# Patient Record
Sex: MALE | Race: WHITE | Employment: OTHER | URBAN - METROPOLITAN AREA
[De-identification: names, ages, dates, MRNs, and addresses within clinical notes are randomized per-mention and may not be internally consistent; named-entity substitution may affect disease eponyms.]

---

## 2017-01-10 ENCOUNTER — TELEPHONE (OUTPATIENT)
Dept: FAMILY MEDICINE CLINIC | Age: 82
End: 2017-01-10

## 2017-01-19 ENCOUNTER — OFFICE VISIT (OUTPATIENT)
Dept: FAMILY MEDICINE CLINIC | Age: 82
End: 2017-01-19

## 2017-01-19 ENCOUNTER — TELEPHONE (OUTPATIENT)
Dept: FAMILY MEDICINE CLINIC | Age: 82
End: 2017-01-19

## 2017-01-19 VITALS
RESPIRATION RATE: 18 BRPM | HEIGHT: 71 IN | WEIGHT: 167 LBS | SYSTOLIC BLOOD PRESSURE: 138 MMHG | HEART RATE: 48 BPM | TEMPERATURE: 97.7 F | DIASTOLIC BLOOD PRESSURE: 80 MMHG | BODY MASS INDEX: 23.38 KG/M2

## 2017-01-19 DIAGNOSIS — D48.5 NEOPLASM OF UNCERTAIN BEHAVIOR OF SKIN: Primary | ICD-10-CM

## 2017-01-19 PROCEDURE — 1036F TOBACCO NON-USER: CPT | Performed by: FAMILY MEDICINE

## 2017-01-19 PROCEDURE — 11100 PR BIOPSY OF SKIN LESION: CPT | Performed by: FAMILY MEDICINE

## 2017-01-19 PROCEDURE — 99999 PR OFFICE/OUTPT VISIT,PROCEDURE ONLY: CPT | Performed by: FAMILY MEDICINE

## 2017-01-23 DIAGNOSIS — C44.629 SQUAMOUS CELL CARCINOMA OF LEFT UPPER EXTREMITY: ICD-10-CM

## 2017-01-24 ENCOUNTER — TELEPHONE (OUTPATIENT)
Dept: FAMILY MEDICINE CLINIC | Age: 82
End: 2017-01-24

## 2017-02-13 ENCOUNTER — OFFICE VISIT (OUTPATIENT)
Dept: FAMILY MEDICINE CLINIC | Age: 82
End: 2017-02-13

## 2017-02-13 VITALS
SYSTOLIC BLOOD PRESSURE: 134 MMHG | HEIGHT: 71 IN | DIASTOLIC BLOOD PRESSURE: 70 MMHG | BODY MASS INDEX: 23.38 KG/M2 | WEIGHT: 167 LBS | HEART RATE: 58 BPM | RESPIRATION RATE: 20 BRPM

## 2017-02-13 DIAGNOSIS — C44.629 SQUAMOUS CELL CARCINOMA OF LEFT UPPER EXTREMITY: ICD-10-CM

## 2017-02-13 DIAGNOSIS — L82.1 SK (SEBORRHEIC KERATOSIS): Primary | ICD-10-CM

## 2017-02-13 PROCEDURE — G8484 FLU IMMUNIZE NO ADMIN: HCPCS | Performed by: FAMILY MEDICINE

## 2017-02-13 PROCEDURE — 99213 OFFICE O/P EST LOW 20 MIN: CPT | Performed by: FAMILY MEDICINE

## 2017-02-13 PROCEDURE — G8420 CALC BMI NORM PARAMETERS: HCPCS | Performed by: FAMILY MEDICINE

## 2017-02-13 PROCEDURE — 4040F PNEUMOC VAC/ADMIN/RCVD: CPT | Performed by: FAMILY MEDICINE

## 2017-02-13 PROCEDURE — G8427 DOCREV CUR MEDS BY ELIG CLIN: HCPCS | Performed by: FAMILY MEDICINE

## 2017-02-13 PROCEDURE — G8599 NO ASA/ANTIPLAT THER USE RNG: HCPCS | Performed by: FAMILY MEDICINE

## 2017-02-13 PROCEDURE — 1036F TOBACCO NON-USER: CPT | Performed by: FAMILY MEDICINE

## 2017-02-13 PROCEDURE — 1123F ACP DISCUSS/DSCN MKR DOCD: CPT | Performed by: FAMILY MEDICINE

## 2017-02-21 ENCOUNTER — PROCEDURE VISIT (OUTPATIENT)
Dept: SURGERY | Age: 82
End: 2017-02-21

## 2017-02-21 VITALS — TEMPERATURE: 97.4 F | OXYGEN SATURATION: 94 % | WEIGHT: 163 LBS | BODY MASS INDEX: 22.73 KG/M2

## 2017-02-21 DIAGNOSIS — D04.62: Primary | ICD-10-CM

## 2017-02-21 PROCEDURE — 12032 INTMD RPR S/A/T/EXT 2.6-7.5: CPT | Performed by: DERMATOLOGY

## 2017-02-21 PROCEDURE — 11603 EXC TR-EXT MAL+MARG 2.1-3 CM: CPT | Performed by: DERMATOLOGY

## 2017-02-22 ENCOUNTER — TELEPHONE (OUTPATIENT)
Dept: FAMILY MEDICINE CLINIC | Age: 82
End: 2017-02-22

## 2017-02-27 ENCOUNTER — TELEPHONE (OUTPATIENT)
Dept: SURGERY | Age: 82
End: 2017-02-27

## 2017-03-03 ENCOUNTER — NURSE ONLY (OUTPATIENT)
Dept: FAMILY MEDICINE CLINIC | Age: 82
End: 2017-03-03

## 2017-03-03 VITALS — SYSTOLIC BLOOD PRESSURE: 138 MMHG | DIASTOLIC BLOOD PRESSURE: 70 MMHG | HEART RATE: 76 BPM

## 2017-03-03 DIAGNOSIS — Z01.30 BLOOD PRESSURE CHECK: Primary | ICD-10-CM

## 2017-03-03 PROCEDURE — 99999 PR OFFICE/OUTPT VISIT,PROCEDURE ONLY: CPT | Performed by: FAMILY MEDICINE

## 2017-03-06 ENCOUNTER — NURSE ONLY (OUTPATIENT)
Dept: SURGERY | Age: 82
End: 2017-03-06

## 2017-03-06 ENCOUNTER — OFFICE VISIT (OUTPATIENT)
Dept: FAMILY MEDICINE CLINIC | Age: 82
End: 2017-03-06

## 2017-03-06 VITALS
HEART RATE: 76 BPM | DIASTOLIC BLOOD PRESSURE: 80 MMHG | BODY MASS INDEX: 23.52 KG/M2 | WEIGHT: 168 LBS | HEIGHT: 71 IN | SYSTOLIC BLOOD PRESSURE: 138 MMHG | TEMPERATURE: 98.6 F | RESPIRATION RATE: 16 BRPM

## 2017-03-06 DIAGNOSIS — S46.811A TRAPEZIUS STRAIN, RIGHT, INITIAL ENCOUNTER: Primary | ICD-10-CM

## 2017-03-06 DIAGNOSIS — Z48.02 VISIT FOR SUTURE REMOVAL: Primary | ICD-10-CM

## 2017-03-06 PROCEDURE — 1123F ACP DISCUSS/DSCN MKR DOCD: CPT | Performed by: FAMILY MEDICINE

## 2017-03-06 PROCEDURE — 4040F PNEUMOC VAC/ADMIN/RCVD: CPT | Performed by: FAMILY MEDICINE

## 2017-03-06 PROCEDURE — G8428 CUR MEDS NOT DOCUMENT: HCPCS | Performed by: FAMILY MEDICINE

## 2017-03-06 PROCEDURE — 99213 OFFICE O/P EST LOW 20 MIN: CPT | Performed by: FAMILY MEDICINE

## 2017-03-06 PROCEDURE — 1036F TOBACCO NON-USER: CPT | Performed by: FAMILY MEDICINE

## 2017-03-06 PROCEDURE — G8420 CALC BMI NORM PARAMETERS: HCPCS | Performed by: FAMILY MEDICINE

## 2017-03-06 PROCEDURE — G8484 FLU IMMUNIZE NO ADMIN: HCPCS | Performed by: FAMILY MEDICINE

## 2017-03-06 PROCEDURE — G8599 NO ASA/ANTIPLAT THER USE RNG: HCPCS | Performed by: FAMILY MEDICINE

## 2017-03-08 ENCOUNTER — TELEPHONE (OUTPATIENT)
Dept: FAMILY MEDICINE CLINIC | Age: 82
End: 2017-03-08

## 2017-03-08 DIAGNOSIS — S46.811S TRAPEZIUS STRAIN, RIGHT, SEQUELA: ICD-10-CM

## 2017-03-08 DIAGNOSIS — S46.911S SHOULDER STRAIN, RIGHT, SEQUELA: Primary | ICD-10-CM

## 2017-03-08 RX ORDER — MELOXICAM 15 MG/1
15 TABLET ORAL DAILY
Qty: 30 TABLET | Refills: 0 | Status: SHIPPED | OUTPATIENT
Start: 2017-03-08 | End: 2017-04-13

## 2017-03-15 ENCOUNTER — TELEPHONE (OUTPATIENT)
Dept: FAMILY MEDICINE CLINIC | Age: 82
End: 2017-03-15

## 2017-03-16 ENCOUNTER — HOSPITAL ENCOUNTER (OUTPATIENT)
Dept: OTHER | Age: 82
Discharge: HOME | End: 2017-03-31
Attending: FAMILY MEDICINE | Admitting: FAMILY MEDICINE

## 2017-03-17 ENCOUNTER — TELEPHONE (OUTPATIENT)
Dept: FAMILY MEDICINE CLINIC | Age: 82
End: 2017-03-17

## 2017-03-20 PROBLEM — J96.00 ACUTE RESPIRATORY FAILURE (HCC): Status: ACTIVE | Noted: 2017-03-20

## 2017-03-20 PROBLEM — R79.89 ELEVATED TROPONIN: Status: ACTIVE | Noted: 2017-03-20

## 2017-03-20 PROBLEM — R77.8 ELEVATED TROPONIN: Status: ACTIVE | Noted: 2017-03-20

## 2017-03-20 PROBLEM — I50.23 ACUTE ON CHRONIC SYSTOLIC CHF (CONGESTIVE HEART FAILURE) (HCC): Status: ACTIVE | Noted: 2017-03-20

## 2017-03-24 ENCOUNTER — TELEPHONE (OUTPATIENT)
Dept: FAMILY MEDICINE CLINIC | Age: 82
End: 2017-03-24

## 2017-03-28 ENCOUNTER — OFFICE VISIT (OUTPATIENT)
Dept: CARDIOLOGY CLINIC | Age: 82
End: 2017-03-28

## 2017-03-28 VITALS
HEART RATE: 50 BPM | SYSTOLIC BLOOD PRESSURE: 120 MMHG | OXYGEN SATURATION: 98 % | WEIGHT: 164.5 LBS | HEIGHT: 71 IN | DIASTOLIC BLOOD PRESSURE: 58 MMHG | BODY MASS INDEX: 23.03 KG/M2

## 2017-03-28 DIAGNOSIS — I27.20 PULMONARY HYPERTENSION (HCC): ICD-10-CM

## 2017-03-28 DIAGNOSIS — Z95.1 S/P CABG (CORONARY ARTERY BYPASS GRAFT): ICD-10-CM

## 2017-03-28 DIAGNOSIS — I10 ESSENTIAL HYPERTENSION: ICD-10-CM

## 2017-03-28 DIAGNOSIS — I27.81 COR PULMONALE (HCC): Primary | ICD-10-CM

## 2017-03-28 DIAGNOSIS — I48.91 ATRIAL FIBRILLATION WITH SLOW VENTRICULAR RESPONSE (HCC): ICD-10-CM

## 2017-03-28 DIAGNOSIS — I25.10 CORONARY ARTERY DISEASE INVOLVING NATIVE CORONARY ARTERY OF NATIVE HEART WITHOUT ANGINA PECTORIS: ICD-10-CM

## 2017-03-28 PROCEDURE — G8427 DOCREV CUR MEDS BY ELIG CLIN: HCPCS | Performed by: INTERNAL MEDICINE

## 2017-03-28 PROCEDURE — 99214 OFFICE O/P EST MOD 30 MIN: CPT | Performed by: INTERNAL MEDICINE

## 2017-03-28 PROCEDURE — G8419 CALC BMI OUT NRM PARAM NOF/U: HCPCS | Performed by: INTERNAL MEDICINE

## 2017-03-28 PROCEDURE — 1123F ACP DISCUSS/DSCN MKR DOCD: CPT | Performed by: INTERNAL MEDICINE

## 2017-03-28 PROCEDURE — 93010 ELECTROCARDIOGRAM REPORT: CPT | Performed by: INTERNAL MEDICINE

## 2017-03-28 PROCEDURE — G8484 FLU IMMUNIZE NO ADMIN: HCPCS | Performed by: INTERNAL MEDICINE

## 2017-03-28 PROCEDURE — 1036F TOBACCO NON-USER: CPT | Performed by: INTERNAL MEDICINE

## 2017-03-28 PROCEDURE — 4040F PNEUMOC VAC/ADMIN/RCVD: CPT | Performed by: INTERNAL MEDICINE

## 2017-03-28 PROCEDURE — G8598 ASA/ANTIPLAT THER USED: HCPCS | Performed by: INTERNAL MEDICINE

## 2017-03-28 PROCEDURE — 1111F DSCHRG MED/CURRENT MED MERGE: CPT | Performed by: INTERNAL MEDICINE

## 2017-03-28 RX ORDER — SODIUM PHOSPHATE, DIBASIC AND SODIUM PHOSPHATE, MONOBASIC 7; 19 G/133ML; G/133ML
1 ENEMA RECTAL
COMMUNITY
End: 2017-05-23

## 2017-03-28 RX ORDER — BISACODYL 10 MG
10 SUPPOSITORY, RECTAL RECTAL DAILY
Status: ON HOLD | COMMUNITY
End: 2017-04-15

## 2017-03-28 RX ORDER — MAGNESIUM HYDROXIDE/ALUMINUM HYDROXICE/SIMETHICONE 120; 1200; 1200 MG/30ML; MG/30ML; MG/30ML
30 SUSPENSION ORAL EVERY 6 HOURS PRN
COMMUNITY
End: 2017-05-23

## 2017-03-28 RX ORDER — AMLODIPINE BESYLATE 5 MG/1
5 TABLET ORAL DAILY
COMMUNITY
End: 2017-04-13

## 2017-04-12 ENCOUNTER — TELEPHONE (OUTPATIENT)
Dept: CARDIOLOGY CLINIC | Age: 82
End: 2017-04-12

## 2017-04-13 ENCOUNTER — CARE COORDINATION (OUTPATIENT)
Dept: OTHER | Facility: CLINIC | Age: 82
End: 2017-04-13

## 2017-04-20 ENCOUNTER — CARE COORDINATION (OUTPATIENT)
Dept: OTHER | Facility: CLINIC | Age: 82
End: 2017-04-20

## 2017-04-27 ENCOUNTER — EPISODE CHANGES (OUTPATIENT)
Dept: CASE MANAGEMENT | Age: 82
End: 2017-04-27

## 2017-05-03 ENCOUNTER — TELEPHONE (OUTPATIENT)
Dept: CARDIOLOGY CLINIC | Age: 82
End: 2017-05-03

## 2017-05-16 ENCOUNTER — TELEPHONE (OUTPATIENT)
Dept: FAMILY MEDICINE CLINIC | Age: 82
End: 2017-05-16

## 2017-05-17 ENCOUNTER — TELEPHONE (OUTPATIENT)
Dept: FAMILY MEDICINE CLINIC | Age: 82
End: 2017-05-17

## 2017-05-17 RX ORDER — FUROSEMIDE 20 MG/1
60 TABLET ORAL DAILY
Qty: 90 TABLET | Refills: 0 | Status: SHIPPED | OUTPATIENT
Start: 2017-05-17 | End: 2017-06-15 | Stop reason: SDUPTHER

## 2017-05-17 RX ORDER — POTASSIUM CHLORIDE 750 MG/1
10 TABLET, FILM COATED, EXTENDED RELEASE ORAL DAILY
Qty: 90 TABLET | Refills: 0 | Status: ON HOLD | OUTPATIENT
Start: 2017-05-17 | End: 2017-07-22 | Stop reason: HOSPADM

## 2017-05-18 ENCOUNTER — TELEPHONE (OUTPATIENT)
Dept: FAMILY MEDICINE CLINIC | Age: 82
End: 2017-05-18

## 2017-05-22 ENCOUNTER — TELEPHONE (OUTPATIENT)
Dept: FAMILY MEDICINE CLINIC | Age: 82
End: 2017-05-22

## 2017-05-23 ENCOUNTER — TELEPHONE (OUTPATIENT)
Dept: FAMILY MEDICINE CLINIC | Age: 82
End: 2017-05-23

## 2017-05-23 ENCOUNTER — OFFICE VISIT (OUTPATIENT)
Dept: FAMILY MEDICINE CLINIC | Age: 82
End: 2017-05-23

## 2017-05-23 VITALS
HEART RATE: 44 BPM | TEMPERATURE: 98.1 F | BODY MASS INDEX: 23.24 KG/M2 | RESPIRATION RATE: 16 BRPM | WEIGHT: 166 LBS | HEIGHT: 71 IN | SYSTOLIC BLOOD PRESSURE: 136 MMHG | DIASTOLIC BLOOD PRESSURE: 70 MMHG

## 2017-05-23 DIAGNOSIS — D64.9 ANEMIA, UNSPECIFIED TYPE: ICD-10-CM

## 2017-05-23 DIAGNOSIS — R73.9 HYPERGLYCEMIA: ICD-10-CM

## 2017-05-23 DIAGNOSIS — I10 ESSENTIAL HYPERTENSION: Primary | ICD-10-CM

## 2017-05-23 DIAGNOSIS — R42 DIZZINESS: ICD-10-CM

## 2017-05-23 DIAGNOSIS — I10 ESSENTIAL HYPERTENSION: ICD-10-CM

## 2017-05-23 DIAGNOSIS — I25.10 CORONARY ARTERY DISEASE INVOLVING NATIVE CORONARY ARTERY OF NATIVE HEART WITHOUT ANGINA PECTORIS: ICD-10-CM

## 2017-05-23 DIAGNOSIS — I48.91 ATRIAL FIBRILLATION WITH SLOW VENTRICULAR RESPONSE (HCC): ICD-10-CM

## 2017-05-23 DIAGNOSIS — J43.9 PULMONARY EMPHYSEMA, UNSPECIFIED EMPHYSEMA TYPE (HCC): ICD-10-CM

## 2017-05-23 DIAGNOSIS — E78.5 HYPERLIPIDEMIA LDL GOAL <130: ICD-10-CM

## 2017-05-23 DIAGNOSIS — J44.9 OBSTRUCTIVE CHRONIC BRONCHITIS WITHOUT EXACERBATION (HCC): ICD-10-CM

## 2017-05-23 DIAGNOSIS — I27.20 PULMONARY HYPERTENSION (HCC): ICD-10-CM

## 2017-05-23 DIAGNOSIS — I27.81 COR PULMONALE (HCC): ICD-10-CM

## 2017-05-23 DIAGNOSIS — F03.90 DEMENTIA WITHOUT BEHAVIORAL DISTURBANCE, UNSPECIFIED DEMENTIA TYPE: ICD-10-CM

## 2017-05-23 DIAGNOSIS — F41.9 ANXIETY: ICD-10-CM

## 2017-05-23 PROBLEM — J96.00 ACUTE RESPIRATORY FAILURE (HCC): Status: RESOLVED | Noted: 2017-03-20 | Resolved: 2017-05-23

## 2017-05-23 PROBLEM — I50.23 ACUTE ON CHRONIC SYSTOLIC CHF (CONGESTIVE HEART FAILURE) (HCC): Status: RESOLVED | Noted: 2017-03-20 | Resolved: 2017-05-23

## 2017-05-23 PROBLEM — Z48.02 VISIT FOR SUTURE REMOVAL: Status: RESOLVED | Noted: 2017-03-06 | Resolved: 2017-05-23

## 2017-05-23 PROBLEM — R79.89 ELEVATED TROPONIN: Status: RESOLVED | Noted: 2017-03-20 | Resolved: 2017-05-23

## 2017-05-23 PROBLEM — R77.8 ELEVATED TROPONIN: Status: RESOLVED | Noted: 2017-03-20 | Resolved: 2017-05-23

## 2017-05-23 LAB
ANION GAP SERPL CALCULATED.3IONS-SCNC: 16 MMOL/L (ref 3–16)
BASOPHILS ABSOLUTE: 0.1 K/UL (ref 0–0.2)
BASOPHILS RELATIVE PERCENT: 0.5 %
BUN BLDV-MCNC: 22 MG/DL (ref 7–20)
CALCIUM SERPL-MCNC: 9 MG/DL (ref 8.3–10.6)
CHLORIDE BLD-SCNC: 101 MMOL/L (ref 99–110)
CO2: 25 MMOL/L (ref 21–32)
CREAT SERPL-MCNC: 1.2 MG/DL (ref 0.8–1.3)
EOSINOPHILS ABSOLUTE: 0.1 K/UL (ref 0–0.6)
EOSINOPHILS RELATIVE PERCENT: 1.4 %
GFR AFRICAN AMERICAN: >60
GFR NON-AFRICAN AMERICAN: 57
GLUCOSE BLD-MCNC: 106 MG/DL (ref 70–99)
HCT VFR BLD CALC: 33.5 % (ref 40.5–52.5)
HEMOGLOBIN: 10.8 G/DL (ref 13.5–17.5)
LYMPHOCYTES ABSOLUTE: 3.1 K/UL (ref 1–5.1)
LYMPHOCYTES RELATIVE PERCENT: 29.4 %
MCH RBC QN AUTO: 29.1 PG (ref 26–34)
MCHC RBC AUTO-ENTMCNC: 32.3 G/DL (ref 31–36)
MCV RBC AUTO: 90.1 FL (ref 80–100)
MONOCYTES ABSOLUTE: 0.7 K/UL (ref 0–1.3)
MONOCYTES RELATIVE PERCENT: 6.3 %
NEUTROPHILS ABSOLUTE: 6.5 K/UL (ref 1.7–7.7)
NEUTROPHILS RELATIVE PERCENT: 62.4 %
PDW BLD-RTO: 15 % (ref 12.4–15.4)
PLATELET # BLD: 290 K/UL (ref 135–450)
PMV BLD AUTO: 6.5 FL (ref 5–10.5)
POTASSIUM SERPL-SCNC: 4.5 MMOL/L (ref 3.5–5.1)
RBC # BLD: 3.72 M/UL (ref 4.2–5.9)
SODIUM BLD-SCNC: 142 MMOL/L (ref 136–145)
WBC # BLD: 10.5 K/UL (ref 4–11)

## 2017-05-23 PROCEDURE — G8427 DOCREV CUR MEDS BY ELIG CLIN: HCPCS | Performed by: FAMILY MEDICINE

## 2017-05-23 PROCEDURE — 99214 OFFICE O/P EST MOD 30 MIN: CPT | Performed by: FAMILY MEDICINE

## 2017-05-23 PROCEDURE — 1036F TOBACCO NON-USER: CPT | Performed by: FAMILY MEDICINE

## 2017-05-23 PROCEDURE — G8420 CALC BMI NORM PARAMETERS: HCPCS | Performed by: FAMILY MEDICINE

## 2017-05-23 PROCEDURE — 1123F ACP DISCUSS/DSCN MKR DOCD: CPT | Performed by: FAMILY MEDICINE

## 2017-05-23 PROCEDURE — G8598 ASA/ANTIPLAT THER USED: HCPCS | Performed by: FAMILY MEDICINE

## 2017-05-23 PROCEDURE — 4040F PNEUMOC VAC/ADMIN/RCVD: CPT | Performed by: FAMILY MEDICINE

## 2017-05-23 PROCEDURE — 3023F SPIROM DOC REV: CPT | Performed by: FAMILY MEDICINE

## 2017-05-23 PROCEDURE — G8926 SPIRO NO PERF OR DOC: HCPCS | Performed by: FAMILY MEDICINE

## 2017-05-23 RX ORDER — GABAPENTIN 300 MG/1
300 CAPSULE ORAL 2 TIMES DAILY
Qty: 180 CAPSULE | Refills: 1 | Status: SHIPPED | OUTPATIENT
Start: 2017-05-23 | End: 2017-07-25 | Stop reason: SDUPTHER

## 2017-05-23 RX ORDER — HYDRALAZINE HYDROCHLORIDE 50 MG/1
50 TABLET, FILM COATED ORAL 2 TIMES DAILY
Qty: 180 TABLET | Refills: 1 | Status: SHIPPED | OUTPATIENT
Start: 2017-05-23 | End: 2017-05-24 | Stop reason: SDUPTHER

## 2017-05-23 RX ORDER — MULTIVIT WITH MINERALS/LUTEIN
1 TABLET ORAL NIGHTLY
COMMUNITY
End: 2017-07-25 | Stop reason: SDUPTHER

## 2017-05-23 RX ORDER — GEMFIBROZIL 600 MG/1
600 TABLET, FILM COATED ORAL
Qty: 180 TABLET | Refills: 3 | Status: SHIPPED | OUTPATIENT
Start: 2017-05-23 | End: 2017-07-25 | Stop reason: SDUPTHER

## 2017-05-23 RX ORDER — HYDRALAZINE HYDROCHLORIDE 50 MG/1
50 TABLET, FILM COATED ORAL 2 TIMES DAILY
Qty: 180 TABLET | Refills: 1 | Status: SHIPPED | OUTPATIENT
Start: 2017-05-23 | End: 2017-05-23 | Stop reason: SDUPTHER

## 2017-05-23 RX ORDER — LANOLIN ALCOHOL/MO/W.PET/CERES
500 CREAM (GRAM) TOPICAL DAILY
COMMUNITY
End: 2017-07-10 | Stop reason: DRUGHIGH

## 2017-05-23 RX ORDER — LISINOPRIL 40 MG/1
40 TABLET ORAL DAILY
Qty: 90 TABLET | Refills: 1 | Status: SHIPPED | OUTPATIENT
Start: 2017-05-23 | End: 2017-07-25 | Stop reason: SDUPTHER

## 2017-05-23 RX ORDER — FENOFIBRATE 145 MG/1
145 TABLET, COATED ORAL DAILY
Qty: 90 TABLET | Refills: 1 | Status: SHIPPED | OUTPATIENT
Start: 2017-05-23 | End: 2017-05-23

## 2017-05-23 RX ORDER — ROPINIROLE 2 MG/1
2 TABLET, FILM COATED ORAL NIGHTLY
Qty: 90 TABLET | Refills: 1 | Status: SHIPPED | OUTPATIENT
Start: 2017-05-23 | End: 2017-07-25 | Stop reason: SDUPTHER

## 2017-05-24 ENCOUNTER — TELEPHONE (OUTPATIENT)
Dept: FAMILY MEDICINE CLINIC | Age: 82
End: 2017-05-24

## 2017-05-24 PROBLEM — R00.1 BRADYCARDIA: Status: ACTIVE | Noted: 2017-05-24

## 2017-05-24 RX ORDER — HYDRALAZINE HYDROCHLORIDE 50 MG/1
75 TABLET, FILM COATED ORAL 2 TIMES DAILY
Qty: 45 TABLET | Refills: 2 | Status: SHIPPED | OUTPATIENT
Start: 2017-05-24 | End: 2017-07-25 | Stop reason: SDUPTHER

## 2017-05-31 ENCOUNTER — TELEPHONE (OUTPATIENT)
Dept: FAMILY MEDICINE CLINIC | Age: 82
End: 2017-05-31

## 2017-06-05 ENCOUNTER — TELEPHONE (OUTPATIENT)
Dept: FAMILY MEDICINE CLINIC | Age: 82
End: 2017-06-05

## 2017-06-06 PROBLEM — R33.9 URINARY RETENTION: Status: ACTIVE | Noted: 2017-06-06

## 2017-06-10 ENCOUNTER — CARE COORDINATION (OUTPATIENT)
Dept: CASE MANAGEMENT | Age: 82
End: 2017-06-10

## 2017-06-12 ENCOUNTER — CARE COORDINATION (OUTPATIENT)
Dept: CASE MANAGEMENT | Age: 82
End: 2017-06-12

## 2017-06-12 ENCOUNTER — TELEPHONE (OUTPATIENT)
Dept: FAMILY MEDICINE CLINIC | Age: 82
End: 2017-06-12

## 2017-06-14 ENCOUNTER — TELEPHONE (OUTPATIENT)
Dept: FAMILY MEDICINE CLINIC | Age: 82
End: 2017-06-14

## 2017-06-14 RX ORDER — BISACODYL 10 MG
10 SUPPOSITORY, RECTAL RECTAL DAILY PRN
Qty: 10 SUPPOSITORY | Refills: 0 | Status: SHIPPED | OUTPATIENT
Start: 2017-06-14 | End: 2017-07-10 | Stop reason: ALTCHOICE

## 2017-06-15 RX ORDER — FUROSEMIDE 20 MG/1
TABLET ORAL
Qty: 90 TABLET | Refills: 0 | Status: SHIPPED | OUTPATIENT
Start: 2017-06-15 | End: 2017-06-19

## 2017-06-19 ENCOUNTER — TELEPHONE (OUTPATIENT)
Dept: FAMILY MEDICINE CLINIC | Age: 82
End: 2017-06-19

## 2017-06-19 RX ORDER — FUROSEMIDE 20 MG/1
TABLET ORAL
Qty: 90 TABLET | Refills: 0 | Status: SHIPPED | OUTPATIENT
Start: 2017-06-19 | End: 2017-06-23

## 2017-06-23 RX ORDER — FUROSEMIDE 20 MG/1
TABLET ORAL
Qty: 90 TABLET | Refills: 2 | Status: SHIPPED | OUTPATIENT
Start: 2017-06-23 | End: 2017-07-10 | Stop reason: SDUPTHER

## 2017-06-28 ENCOUNTER — TELEPHONE (OUTPATIENT)
Dept: FAMILY MEDICINE CLINIC | Age: 82
End: 2017-06-28

## 2017-07-10 ENCOUNTER — OFFICE VISIT (OUTPATIENT)
Dept: CARDIOLOGY CLINIC | Age: 82
End: 2017-07-10

## 2017-07-10 VITALS
BODY MASS INDEX: 18.93 KG/M2 | HEIGHT: 71 IN | DIASTOLIC BLOOD PRESSURE: 40 MMHG | HEART RATE: 53 BPM | OXYGEN SATURATION: 98 % | SYSTOLIC BLOOD PRESSURE: 110 MMHG | WEIGHT: 135.2 LBS

## 2017-07-10 DIAGNOSIS — Z95.1 S/P CABG (CORONARY ARTERY BYPASS GRAFT): ICD-10-CM

## 2017-07-10 DIAGNOSIS — I50.810 RIGHT-SIDED HEART FAILURE (HCC): Primary | ICD-10-CM

## 2017-07-10 DIAGNOSIS — I48.91 ATRIAL FIBRILLATION WITH SLOW VENTRICULAR RESPONSE (HCC): ICD-10-CM

## 2017-07-10 DIAGNOSIS — I25.10 CORONARY ARTERY DISEASE INVOLVING NATIVE CORONARY ARTERY OF NATIVE HEART WITHOUT ANGINA PECTORIS: ICD-10-CM

## 2017-07-10 DIAGNOSIS — I10 ESSENTIAL HYPERTENSION: ICD-10-CM

## 2017-07-10 PROCEDURE — 1123F ACP DISCUSS/DSCN MKR DOCD: CPT | Performed by: INTERNAL MEDICINE

## 2017-07-10 PROCEDURE — G8598 ASA/ANTIPLAT THER USED: HCPCS | Performed by: INTERNAL MEDICINE

## 2017-07-10 PROCEDURE — G8427 DOCREV CUR MEDS BY ELIG CLIN: HCPCS | Performed by: INTERNAL MEDICINE

## 2017-07-10 PROCEDURE — 99214 OFFICE O/P EST MOD 30 MIN: CPT | Performed by: INTERNAL MEDICINE

## 2017-07-10 PROCEDURE — G8420 CALC BMI NORM PARAMETERS: HCPCS | Performed by: INTERNAL MEDICINE

## 2017-07-10 PROCEDURE — 93000 ELECTROCARDIOGRAM COMPLETE: CPT | Performed by: INTERNAL MEDICINE

## 2017-07-10 PROCEDURE — 1036F TOBACCO NON-USER: CPT | Performed by: INTERNAL MEDICINE

## 2017-07-10 PROCEDURE — 4040F PNEUMOC VAC/ADMIN/RCVD: CPT | Performed by: INTERNAL MEDICINE

## 2017-07-10 RX ORDER — FUROSEMIDE 20 MG/1
60 TABLET ORAL DAILY
Qty: 90 TABLET | Refills: 3 | Status: SHIPPED | OUTPATIENT
Start: 2017-07-10 | End: 2017-07-25 | Stop reason: SDUPTHER

## 2017-07-11 ENCOUNTER — TELEPHONE (OUTPATIENT)
Dept: FAMILY MEDICINE CLINIC | Age: 82
End: 2017-07-11

## 2017-07-19 ENCOUNTER — TELEPHONE (OUTPATIENT)
Dept: FAMILY MEDICINE CLINIC | Age: 82
End: 2017-07-19

## 2017-07-23 ENCOUNTER — CARE COORDINATION (OUTPATIENT)
Dept: CASE MANAGEMENT | Age: 82
End: 2017-07-23

## 2017-07-25 DIAGNOSIS — I10 ESSENTIAL HYPERTENSION: ICD-10-CM

## 2017-07-25 RX ORDER — FUROSEMIDE 20 MG/1
60 TABLET ORAL DAILY
Qty: 90 TABLET | Refills: 3 | Status: SHIPPED | OUTPATIENT
Start: 2017-07-25 | End: 2017-08-22 | Stop reason: SDUPTHER

## 2017-07-25 RX ORDER — ISOSORBIDE MONONITRATE 30 MG/1
30 TABLET, EXTENDED RELEASE ORAL DAILY
Qty: 30 TABLET | Refills: 5 | Status: ON HOLD | OUTPATIENT
Start: 2017-07-25 | End: 2017-12-09

## 2017-07-25 RX ORDER — HYDRALAZINE HYDROCHLORIDE 50 MG/1
75 TABLET, FILM COATED ORAL 2 TIMES DAILY
Qty: 45 TABLET | Refills: 2 | Status: SHIPPED | OUTPATIENT
Start: 2017-07-25 | End: 2017-09-05 | Stop reason: SDUPTHER

## 2017-07-25 RX ORDER — LISINOPRIL 40 MG/1
40 TABLET ORAL DAILY
Qty: 90 TABLET | Refills: 1 | Status: ON HOLD | OUTPATIENT
Start: 2017-07-25 | End: 2017-12-09

## 2017-07-25 RX ORDER — DOCUSATE SODIUM 100 MG/1
100 CAPSULE, LIQUID FILLED ORAL 2 TIMES DAILY
Qty: 60 CAPSULE | Refills: 5 | Status: SHIPPED | OUTPATIENT
Start: 2017-07-25 | End: 2018-02-14 | Stop reason: SDUPTHER

## 2017-07-25 RX ORDER — GEMFIBROZIL 600 MG/1
600 TABLET, FILM COATED ORAL
Qty: 180 TABLET | Refills: 3 | Status: SHIPPED | OUTPATIENT
Start: 2017-07-25 | End: 2019-11-23

## 2017-07-25 RX ORDER — ROPINIROLE 2 MG/1
2 TABLET, FILM COATED ORAL NIGHTLY
Qty: 90 TABLET | Refills: 1 | Status: SHIPPED | OUTPATIENT
Start: 2017-07-25 | End: 2018-03-19 | Stop reason: SDUPTHER

## 2017-07-25 RX ORDER — GABAPENTIN 300 MG/1
300 CAPSULE ORAL 2 TIMES DAILY
Qty: 180 CAPSULE | Refills: 1 | Status: ON HOLD | OUTPATIENT
Start: 2017-07-25 | End: 2017-12-05 | Stop reason: HOSPADM

## 2017-07-25 RX ORDER — MULTIVIT WITH MINERALS/LUTEIN
1 TABLET ORAL NIGHTLY
Qty: 30 TABLET | Refills: 5 | Status: SHIPPED | OUTPATIENT
Start: 2017-07-25 | End: 2018-07-30 | Stop reason: SDUPTHER

## 2017-07-25 RX ORDER — CEFPODOXIME PROXETIL 100 MG/1
100 TABLET, FILM COATED ORAL 2 TIMES DAILY
Qty: 18 TABLET | Refills: 0 | Status: SHIPPED | OUTPATIENT
Start: 2017-07-25 | End: 2017-08-03

## 2017-07-25 RX ORDER — CALCIUM CARBONATE 500(1250)
500 TABLET ORAL 2 TIMES DAILY
Qty: 60 TABLET | Refills: 5 | Status: ON HOLD | OUTPATIENT
Start: 2017-07-25 | End: 2017-12-03 | Stop reason: ALTCHOICE

## 2017-08-10 ENCOUNTER — TELEPHONE (OUTPATIENT)
Dept: FAMILY MEDICINE CLINIC | Age: 82
End: 2017-08-10

## 2017-08-14 RX ORDER — POTASSIUM CHLORIDE 750 MG/1
TABLET, FILM COATED, EXTENDED RELEASE ORAL
Qty: 90 TABLET | Refills: 0 | Status: ON HOLD | OUTPATIENT
Start: 2017-08-14 | End: 2017-12-03 | Stop reason: ALTCHOICE

## 2017-08-21 ENCOUNTER — OFFICE VISIT (OUTPATIENT)
Dept: FAMILY MEDICINE CLINIC | Age: 82
End: 2017-08-21

## 2017-08-21 VITALS
DIASTOLIC BLOOD PRESSURE: 74 MMHG | TEMPERATURE: 98.1 F | HEART RATE: 58 BPM | WEIGHT: 156 LBS | BODY MASS INDEX: 21.84 KG/M2 | RESPIRATION RATE: 18 BRPM | HEIGHT: 71 IN | SYSTOLIC BLOOD PRESSURE: 138 MMHG

## 2017-08-21 DIAGNOSIS — R73.9 HYPERGLYCEMIA: ICD-10-CM

## 2017-08-21 DIAGNOSIS — J43.9 PULMONARY EMPHYSEMA, UNSPECIFIED EMPHYSEMA TYPE (HCC): ICD-10-CM

## 2017-08-21 DIAGNOSIS — F41.9 ANXIETY: ICD-10-CM

## 2017-08-21 DIAGNOSIS — F03.90 DEMENTIA WITHOUT BEHAVIORAL DISTURBANCE, UNSPECIFIED DEMENTIA TYPE: ICD-10-CM

## 2017-08-21 DIAGNOSIS — I27.20 PULMONARY HYPERTENSION (HCC): ICD-10-CM

## 2017-08-21 DIAGNOSIS — I10 ESSENTIAL HYPERTENSION: ICD-10-CM

## 2017-08-21 DIAGNOSIS — Z87.440 HISTORY OF UTI: ICD-10-CM

## 2017-08-21 DIAGNOSIS — I48.91 ATRIAL FIBRILLATION WITH SLOW VENTRICULAR RESPONSE (HCC): ICD-10-CM

## 2017-08-21 DIAGNOSIS — I10 ESSENTIAL HYPERTENSION: Primary | ICD-10-CM

## 2017-08-21 DIAGNOSIS — I27.81 COR PULMONALE, CHRONIC (HCC): ICD-10-CM

## 2017-08-21 DIAGNOSIS — E87.5 HYPERKALEMIA: ICD-10-CM

## 2017-08-21 DIAGNOSIS — N39.498 OTHER URINARY INCONTINENCE: ICD-10-CM

## 2017-08-21 PROBLEM — R77.8 ELEVATED TROPONIN: Status: RESOLVED | Noted: 2017-03-20 | Resolved: 2017-08-21

## 2017-08-21 PROBLEM — R79.89 ELEVATED TROPONIN: Status: RESOLVED | Noted: 2017-03-20 | Resolved: 2017-08-21

## 2017-08-21 LAB
ANION GAP SERPL CALCULATED.3IONS-SCNC: 13 MMOL/L (ref 3–16)
BUN BLDV-MCNC: 36 MG/DL (ref 7–20)
CALCIUM SERPL-MCNC: 9.3 MG/DL (ref 8.3–10.6)
CHLORIDE BLD-SCNC: 98 MMOL/L (ref 99–110)
CO2: 26 MMOL/L (ref 21–32)
CREAT SERPL-MCNC: 1.4 MG/DL (ref 0.8–1.3)
GFR AFRICAN AMERICAN: 57
GFR NON-AFRICAN AMERICAN: 47
GLUCOSE BLD-MCNC: 103 MG/DL (ref 70–99)
POTASSIUM SERPL-SCNC: 4.8 MMOL/L (ref 3.5–5.1)
SODIUM BLD-SCNC: 137 MMOL/L (ref 136–145)

## 2017-08-21 PROCEDURE — 1036F TOBACCO NON-USER: CPT | Performed by: FAMILY MEDICINE

## 2017-08-21 PROCEDURE — G8420 CALC BMI NORM PARAMETERS: HCPCS | Performed by: FAMILY MEDICINE

## 2017-08-21 PROCEDURE — 1123F ACP DISCUSS/DSCN MKR DOCD: CPT | Performed by: FAMILY MEDICINE

## 2017-08-21 PROCEDURE — 1111F DSCHRG MED/CURRENT MED MERGE: CPT | Performed by: FAMILY MEDICINE

## 2017-08-21 PROCEDURE — G8598 ASA/ANTIPLAT THER USED: HCPCS | Performed by: FAMILY MEDICINE

## 2017-08-21 PROCEDURE — G8926 SPIRO NO PERF OR DOC: HCPCS | Performed by: FAMILY MEDICINE

## 2017-08-21 PROCEDURE — 3023F SPIROM DOC REV: CPT | Performed by: FAMILY MEDICINE

## 2017-08-21 PROCEDURE — 4040F PNEUMOC VAC/ADMIN/RCVD: CPT | Performed by: FAMILY MEDICINE

## 2017-08-21 PROCEDURE — G8427 DOCREV CUR MEDS BY ELIG CLIN: HCPCS | Performed by: FAMILY MEDICINE

## 2017-08-21 PROCEDURE — 99214 OFFICE O/P EST MOD 30 MIN: CPT | Performed by: FAMILY MEDICINE

## 2017-08-21 RX ORDER — LACTOSE-REDUCED FOOD
1 LIQUID (ML) ORAL DAILY
Qty: 30 CAN | Refills: 11 | Status: ON HOLD | OUTPATIENT
Start: 2017-08-21 | End: 2018-11-22

## 2017-08-21 RX ORDER — UNDERPADS 23" X 36"
EACH MISCELLANEOUS
Qty: 100 EACH | Refills: 11 | Status: ON HOLD | OUTPATIENT
Start: 2017-08-21 | End: 2019-12-22 | Stop reason: ALTCHOICE

## 2017-08-22 RX ORDER — FUROSEMIDE 20 MG/1
40 TABLET ORAL DAILY
Qty: 90 TABLET | Refills: 3 | COMMUNITY
Start: 2017-08-22 | End: 2017-09-19 | Stop reason: SDUPTHER

## 2017-08-23 ENCOUNTER — TELEPHONE (OUTPATIENT)
Dept: FAMILY MEDICINE CLINIC | Age: 82
End: 2017-08-23

## 2017-08-24 RX ORDER — ASPIRIN 81 MG/1
81 TABLET, CHEWABLE ORAL DAILY
Qty: 30 TABLET | Refills: 3 | Status: SHIPPED | OUTPATIENT
Start: 2017-08-24 | End: 2019-05-03 | Stop reason: SDUPTHER

## 2017-08-29 ENCOUNTER — TELEPHONE (OUTPATIENT)
Dept: FAMILY MEDICINE CLINIC | Age: 82
End: 2017-08-29

## 2017-09-06 RX ORDER — HYDRALAZINE HYDROCHLORIDE 50 MG/1
75 TABLET, FILM COATED ORAL 2 TIMES DAILY
Qty: 45 TABLET | Refills: 5 | Status: ON HOLD | OUTPATIENT
Start: 2017-09-06 | End: 2017-12-09

## 2017-09-13 ENCOUNTER — TELEPHONE (OUTPATIENT)
Dept: FAMILY MEDICINE CLINIC | Age: 82
End: 2017-09-13

## 2017-09-20 RX ORDER — FUROSEMIDE 20 MG/1
40 TABLET ORAL DAILY
Qty: 90 TABLET | Refills: 3 | Status: ON HOLD | OUTPATIENT
Start: 2017-09-20 | End: 2017-12-10

## 2017-10-06 ENCOUNTER — TELEPHONE (OUTPATIENT)
Dept: FAMILY MEDICINE CLINIC | Age: 82
End: 2017-10-06

## 2017-10-10 ENCOUNTER — TELEPHONE (OUTPATIENT)
Dept: FAMILY MEDICINE CLINIC | Age: 82
End: 2017-10-10

## 2017-10-10 NOTE — TELEPHONE ENCOUNTER
Dimas from [facility] is calling to tell Dr. Luis Burgos that that the pt BP is 200/70 and his Hr is 64 he is complaining of wooziness but no other Sx. Dimas call back number is [phone]    Please advise. This is just a update. Thanks.

## 2017-10-13 ENCOUNTER — TELEPHONE (OUTPATIENT)
Dept: FAMILY MEDICINE CLINIC | Age: 82
End: 2017-10-13

## 2017-10-13 NOTE — TELEPHONE ENCOUNTER
Blood Pressure 186/78    Pulse 44     Asymptomatic     - Selena from [facility] advised daughter of Pt. To schedule an appointment with Dr. Kin Meckel     Please advise, thank you!

## 2017-10-19 ENCOUNTER — OFFICE VISIT (OUTPATIENT)
Dept: FAMILY MEDICINE CLINIC | Age: 82
End: 2017-10-19

## 2017-10-19 VITALS
RESPIRATION RATE: 16 BRPM | HEIGHT: 71 IN | HEART RATE: 64 BPM | SYSTOLIC BLOOD PRESSURE: 162 MMHG | TEMPERATURE: 97.9 F | WEIGHT: 159.8 LBS | DIASTOLIC BLOOD PRESSURE: 70 MMHG | BODY MASS INDEX: 22.37 KG/M2

## 2017-10-19 DIAGNOSIS — I10 ESSENTIAL HYPERTENSION: Primary | ICD-10-CM

## 2017-10-19 PROCEDURE — G8598 ASA/ANTIPLAT THER USED: HCPCS | Performed by: NURSE PRACTITIONER

## 2017-10-19 PROCEDURE — G8420 CALC BMI NORM PARAMETERS: HCPCS | Performed by: NURSE PRACTITIONER

## 2017-10-19 PROCEDURE — 1036F TOBACCO NON-USER: CPT | Performed by: NURSE PRACTITIONER

## 2017-10-19 PROCEDURE — G8484 FLU IMMUNIZE NO ADMIN: HCPCS | Performed by: NURSE PRACTITIONER

## 2017-10-19 PROCEDURE — G8427 DOCREV CUR MEDS BY ELIG CLIN: HCPCS | Performed by: NURSE PRACTITIONER

## 2017-10-19 PROCEDURE — 99213 OFFICE O/P EST LOW 20 MIN: CPT | Performed by: NURSE PRACTITIONER

## 2017-10-19 PROCEDURE — 4040F PNEUMOC VAC/ADMIN/RCVD: CPT | Performed by: NURSE PRACTITIONER

## 2017-10-19 PROCEDURE — 1123F ACP DISCUSS/DSCN MKR DOCD: CPT | Performed by: NURSE PRACTITIONER

## 2017-10-19 RX ORDER — AMLODIPINE BESYLATE 2.5 MG/1
2.5 TABLET ORAL DAILY
Qty: 30 TABLET | Refills: 5 | Status: ON HOLD | OUTPATIENT
Start: 2017-10-19 | End: 2017-12-03 | Stop reason: ALTCHOICE

## 2017-10-19 ASSESSMENT — ENCOUNTER SYMPTOMS
CONSTIPATION: 0
VOMITING: 0
SHORTNESS OF BREATH: 0
DIARRHEA: 0
CHEST TIGHTNESS: 0
NAUSEA: 0
COUGH: 0

## 2017-10-19 NOTE — PROGRESS NOTES
visit:    Essential hypertension: uncontrolled. Already taking lisinopril 40 mg daily, hydralazine 75 mg BID and imdur 30 mg daily. Has been on amlodipine in the past, but taken off due to hypotension. Will restart medication, but smaller dose. Patient does have chronic dizziness, so goal would be to get SBP less than 160. Avoiding beta blocker due to history of bradycardia. Patient and plan discussed with his PCP Dr. Ayla Anguiano. -     amLODIPine (NORVASC) 2.5 MG tablet; Take 1 tablet by mouth daily  Continue to follow low salt diet. Will obtain flu vaccine at nursing facility. Return in about 3 months (around 1/19/2018), or if symptoms worsen or fail to improve, for blood pressure, with Dr. Ayla Anguiano.

## 2017-10-23 ENCOUNTER — TELEPHONE (OUTPATIENT)
Dept: FAMILY MEDICINE CLINIC | Age: 82
End: 2017-10-23

## 2017-10-23 NOTE — TELEPHONE ENCOUNTER
Luis Armando Santizo is calling to inform Dr. Mauro Toure that pt bp   190/115    No head pain, no chest pain.   No dizzy     presently started amLODIPine (NORVASC) 2.5 MG tablet last week

## 2017-10-30 ENCOUNTER — TELEPHONE (OUTPATIENT)
Dept: FAMILY MEDICINE CLINIC | Age: 82
End: 2017-10-30

## 2017-10-30 NOTE — TELEPHONE ENCOUNTER
Selena from [facility] is calling to tell Dr. Agata Espinoza that the pt BP is 184/78 in Rt arm and  176/96 in Left arm  The pt pulse is 40 at rest  No other complaints at the moment  The pt just started amlodipine 5 mg daily but its not helping. Selena call back number is [phone]    Please advise. Thanks.

## 2017-11-02 ENCOUNTER — TELEPHONE (OUTPATIENT)
Dept: FAMILY MEDICINE CLINIC | Age: 82
End: 2017-11-02

## 2017-11-02 NOTE — TELEPHONE ENCOUNTER
Spoke with DOP and advised her of Cristina response. DOP states that the pharmacy is giving them the brand name Norvasc instead of amlodipine. Advised DOP to contact the pharmacy and ask why the name brand name is being dispensed instead of generic that was prescribed. She states that she will follow up the pharmacy and let us know what needs to be done. Also DOP questioned that she goggled Norvasc and it stated that Norvasc has an contraindication with gabapentin. Spoke with Latrice Esparza and she states it is ok for patient to take both of these medications and that patient has taken these two medications together in the pass with issues. Advised DOP to follow up with our office with any issues concerns.

## 2017-11-02 NOTE — TELEPHONE ENCOUNTER
Medications work differently. Dr. Caitlin Lobato reviewed medications earlier this year and tried to decrease what she could. All medications are generic. Due to his other co morbidities, staying on amlodipine for his BP is the best choice.

## 2017-11-28 ENCOUNTER — CARE COORDINATION (OUTPATIENT)
Dept: CARE COORDINATION | Age: 82
End: 2017-11-28

## 2017-11-28 NOTE — CARE COORDINATION
Patient Excluded from Care Coordination?  Yes     The patient will be excluded from Care Coordination for the following reason: Other patient a resident -Assisted living; POA declines CC enrollment

## 2017-11-30 ENCOUNTER — TELEPHONE (OUTPATIENT)
Dept: FAMILY MEDICINE CLINIC | Age: 82
End: 2017-11-30

## 2017-11-30 DIAGNOSIS — R50.9 FEVER, UNSPECIFIED FEVER CAUSE: ICD-10-CM

## 2017-11-30 DIAGNOSIS — R05.9 COUGH: Primary | ICD-10-CM

## 2017-11-30 RX ORDER — AZITHROMYCIN 250 MG/1
TABLET, FILM COATED ORAL
Qty: 1 PACKET | Refills: 0 | Status: SHIPPED | OUTPATIENT
Start: 2017-11-30 | End: 2017-11-30 | Stop reason: SDUPTHER

## 2017-11-30 RX ORDER — AZITHROMYCIN 250 MG/1
TABLET, FILM COATED ORAL
Qty: 1 PACKET | Refills: 0 | Status: SHIPPED | OUTPATIENT
Start: 2017-11-30 | End: 2017-12-02

## 2017-11-30 NOTE — TELEPHONE ENCOUNTER
Virgie Domínguez    [phone] fax    Selena calling [facility] back   I told her medicaiton and order were placed.     I faxed the order to above #

## 2017-12-01 ENCOUNTER — TELEPHONE (OUTPATIENT)
Dept: FAMILY MEDICINE CLINIC | Age: 82
End: 2017-12-01

## 2017-12-01 NOTE — TELEPHONE ENCOUNTER
Tell ECF looks like Pneumonia  Please finish taking ALL of the antibiotics. Repeat CXR in 1 month  What is his pulse ox?

## 2017-12-02 PROBLEM — J18.9 HCAP (HEALTHCARE-ASSOCIATED PNEUMONIA): Status: ACTIVE | Noted: 2017-12-02

## 2017-12-02 PROBLEM — R09.02 HYPOXIA: Status: ACTIVE | Noted: 2017-12-02

## 2017-12-11 ENCOUNTER — CARE COORDINATION (OUTPATIENT)
Dept: CASE MANAGEMENT | Age: 82
End: 2017-12-11

## 2017-12-20 ENCOUNTER — CARE COORDINATION (OUTPATIENT)
Dept: CASE MANAGEMENT | Age: 82
End: 2017-12-20

## 2017-12-21 ENCOUNTER — TELEPHONE (OUTPATIENT)
Dept: FAMILY MEDICINE CLINIC | Age: 82
End: 2017-12-21

## 2017-12-22 ENCOUNTER — CARE COORDINATION (OUTPATIENT)
Dept: CASE MANAGEMENT | Age: 82
End: 2017-12-22

## 2017-12-22 ENCOUNTER — HOSPITAL ENCOUNTER (OUTPATIENT)
Dept: OTHER | Age: 82
Discharge: HOME | End: 2017-12-22
Attending: NURSE PRACTITIONER | Admitting: NURSE PRACTITIONER

## 2017-12-22 ENCOUNTER — OFFICE VISIT (OUTPATIENT)
Dept: FAMILY MEDICINE CLINIC | Age: 82
End: 2017-12-22

## 2017-12-22 VITALS
WEIGHT: 153.4 LBS | OXYGEN SATURATION: 98 % | BODY MASS INDEX: 21.48 KG/M2 | TEMPERATURE: 97.6 F | RESPIRATION RATE: 16 BRPM | HEART RATE: 48 BPM | HEIGHT: 71 IN | DIASTOLIC BLOOD PRESSURE: 80 MMHG | SYSTOLIC BLOOD PRESSURE: 148 MMHG

## 2017-12-22 DIAGNOSIS — M54.40 ACUTE RIGHT-SIDED LOW BACK PAIN WITH SCIATICA, SCIATICA LATERALITY UNSPECIFIED: Primary | ICD-10-CM

## 2017-12-22 DIAGNOSIS — M54.40 ACUTE RIGHT-SIDED LOW BACK PAIN WITH SCIATICA, SCIATICA LATERALITY UNSPECIFIED: ICD-10-CM

## 2017-12-22 PROCEDURE — 99214 OFFICE O/P EST MOD 30 MIN: CPT | Performed by: NURSE PRACTITIONER

## 2017-12-22 ASSESSMENT — ENCOUNTER SYMPTOMS
GASTROINTESTINAL NEGATIVE: 1
RESPIRATORY NEGATIVE: 1
BACK PAIN: 1
EYES NEGATIVE: 1

## 2017-12-22 ASSESSMENT — PATIENT HEALTH QUESTIONNAIRE - PHQ9
2. FEELING DOWN, DEPRESSED OR HOPELESS: 1
SUM OF ALL RESPONSES TO PHQ9 QUESTIONS 1 & 2: 2
1. LITTLE INTEREST OR PLEASURE IN DOING THINGS: 1
SUM OF ALL RESPONSES TO PHQ QUESTIONS 1-9: 2

## 2017-12-22 NOTE — CARE COORDINATION
Called skilled nursing facility [facility] for a patient update. Nurse Chuck Orozco reports patient discharged back to [facility]. [facility] notified. Post acute transition coordinator signing off.  Juan Diego Wang, ODALISN  [facility] Transition Coordinator  [phone]

## 2017-12-22 NOTE — PROGRESS NOTES
Subjective     CC: right hip pain  Chief Complaint   Patient presents with    Hip Pain     pt states he has had right sided hip pain since early Dec. 2017 that is in his lower back and shoots down his right leg. ( pt did have a fall early Dec.)       HPI    Pt complains of right lower back pain radiating to right hip and radiating down leg. Nov pt had a fall and was dx with pneumonitis  But did not have any back xrays. Denies any paraesthesias, loss of bowel or bladder control. Pain level 10/10 intermittently. Pain increased with sitting, getting up and walking. Objective   Vitals:    12/22/17 1551   BP: (!) 148/80   Site: Right Arm   Position: Sitting   Cuff Size: Medium Adult   Pulse: (!) 48   Resp: 16   Temp: 97.6 °F (36.4 °C)   TempSrc: Oral   SpO2: 98%   Weight: 153 lb 6.4 oz (69.6 kg)   Height: 5' 11\" (1.803 m)   Review of Systems   Constitutional: Negative. HENT: Negative. Eyes: Negative. Respiratory: Negative. Cardiovascular: Negative. Gastrointestinal: Negative. Genitourinary: Negative. Musculoskeletal: Positive for back pain. Skin: Negative. Neurological: Negative. Psychiatric/Behavioral: Negative. Body mass index is 21.39 kg/m². Wt Readings from Last 3 Encounters:   12/22/17 153 lb 6.4 oz (69.6 kg)   12/10/17 156 lb 1.4 oz (70.8 kg)   10/19/17 159 lb 12.8 oz (72.5 kg)     BP Readings from Last 3 Encounters:   12/22/17 (!) 148/80   12/10/17 (!) 138/56   10/19/17 (!) 162/70      Physical Exam   Constitutional: He is oriented to person, place, and time. He appears well-developed and well-nourished. HENT:   Head: Normocephalic and atraumatic. Right Ear: External ear normal.   Left Ear: External ear normal.   Mouth/Throat: Oropharynx is clear and moist.   Eyes: Conjunctivae are normal. Pupils are equal, round, and reactive to light. Neck: Normal range of motion. Neck supple. Cardiovascular: Normal rate, normal heart sounds and intact distal pulses.

## 2017-12-26 ENCOUNTER — TELEPHONE (OUTPATIENT)
Dept: FAMILY MEDICINE CLINIC | Age: 82
End: 2017-12-26

## 2017-12-26 NOTE — TELEPHONE ENCOUNTER
Pt daughter called and said pt has not used the dulera inhaler for 3 weeks now and wanted to know if he really needs to use this. She said its an extra expense and the dr at the hospital said that it was ok for them to cancel order when in hospital          Please call pt daughter  spencer stevens needs to know if pt is still to use as well and needs a call too.

## 2017-12-27 ENCOUNTER — TELEPHONE (OUTPATIENT)
Dept: FAMILY MEDICINE CLINIC | Age: 82
End: 2017-12-27

## 2017-12-27 ENCOUNTER — CARE COORDINATION (OUTPATIENT)
Dept: CARE COORDINATION | Age: 82
End: 2017-12-27

## 2017-12-27 RX ORDER — GABAPENTIN 300 MG/1
300 CAPSULE ORAL 2 TIMES DAILY
Qty: 60 CAPSULE | Refills: 1 | Status: SHIPPED | OUTPATIENT
Start: 2017-12-27 | End: 2017-12-28 | Stop reason: SDUPTHER

## 2017-12-27 NOTE — TELEPHONE ENCOUNTER
DOP is calling stating that pt was in the ER last night with hip pain. They did not find anything. DOP states pt use to take gapapentin and was somehow removed from it.   She would like to see if pt can get back on it to see if this will help his pain in his hip     gabapentin (NEURONTIN) 300 MG capsul

## 2017-12-27 NOTE — TELEPHONE ENCOUNTER
Please notify DOP that prescription was e-scribed to pharmacy for gabapentin. If makes sleepy, just take at bedtime for 10 days and then  Retry morning one.

## 2017-12-28 ENCOUNTER — OFFICE VISIT (OUTPATIENT)
Dept: FAMILY MEDICINE CLINIC | Age: 82
End: 2017-12-28

## 2017-12-28 VITALS
WEIGHT: 155 LBS | BODY MASS INDEX: 21.62 KG/M2 | HEART RATE: 51 BPM | RESPIRATION RATE: 12 BRPM | OXYGEN SATURATION: 91 % | DIASTOLIC BLOOD PRESSURE: 60 MMHG | SYSTOLIC BLOOD PRESSURE: 100 MMHG

## 2017-12-28 DIAGNOSIS — I48.20 CHRONIC ATRIAL FIBRILLATION (HCC): ICD-10-CM

## 2017-12-28 DIAGNOSIS — F03.90 DEMENTIA WITHOUT BEHAVIORAL DISTURBANCE, UNSPECIFIED DEMENTIA TYPE: ICD-10-CM

## 2017-12-28 DIAGNOSIS — I10 ESSENTIAL HYPERTENSION: ICD-10-CM

## 2017-12-28 DIAGNOSIS — I50.33 ACUTE ON CHRONIC DIASTOLIC CONGESTIVE HEART FAILURE (HCC): ICD-10-CM

## 2017-12-28 DIAGNOSIS — I50.33 ACUTE ON CHRONIC DIASTOLIC CONGESTIVE HEART FAILURE (HCC): Primary | ICD-10-CM

## 2017-12-28 DIAGNOSIS — M16.11 ARTHRITIS OF RIGHT HIP: ICD-10-CM

## 2017-12-28 LAB
ANION GAP SERPL CALCULATED.3IONS-SCNC: 12 MMOL/L (ref 3–16)
BUN BLDV-MCNC: 27 MG/DL (ref 7–20)
CALCIUM SERPL-MCNC: 9.6 MG/DL (ref 8.3–10.6)
CHLORIDE BLD-SCNC: 104 MMOL/L (ref 99–110)
CO2: 25 MMOL/L (ref 21–32)
CREAT SERPL-MCNC: 1.4 MG/DL (ref 0.8–1.3)
GFR AFRICAN AMERICAN: 57
GFR NON-AFRICAN AMERICAN: 47
GLUCOSE BLD-MCNC: 137 MG/DL (ref 70–99)
MAGNESIUM: 2.5 MG/DL (ref 1.8–2.4)
POTASSIUM SERPL-SCNC: 4.7 MMOL/L (ref 3.5–5.1)
SODIUM BLD-SCNC: 141 MMOL/L (ref 136–145)

## 2017-12-28 PROCEDURE — 1123F ACP DISCUSS/DSCN MKR DOCD: CPT | Performed by: NURSE PRACTITIONER

## 2017-12-28 PROCEDURE — 1036F TOBACCO NON-USER: CPT | Performed by: NURSE PRACTITIONER

## 2017-12-28 PROCEDURE — 1111F DSCHRG MED/CURRENT MED MERGE: CPT | Performed by: NURSE PRACTITIONER

## 2017-12-28 PROCEDURE — G8427 DOCREV CUR MEDS BY ELIG CLIN: HCPCS | Performed by: NURSE PRACTITIONER

## 2017-12-28 PROCEDURE — G8420 CALC BMI NORM PARAMETERS: HCPCS | Performed by: NURSE PRACTITIONER

## 2017-12-28 PROCEDURE — 4040F PNEUMOC VAC/ADMIN/RCVD: CPT | Performed by: NURSE PRACTITIONER

## 2017-12-28 PROCEDURE — G8484 FLU IMMUNIZE NO ADMIN: HCPCS | Performed by: NURSE PRACTITIONER

## 2017-12-28 PROCEDURE — 99214 OFFICE O/P EST MOD 30 MIN: CPT | Performed by: NURSE PRACTITIONER

## 2017-12-28 PROCEDURE — G8598 ASA/ANTIPLAT THER USED: HCPCS | Performed by: NURSE PRACTITIONER

## 2017-12-28 RX ORDER — CHLORAL HYDRATE 500 MG
1 CAPSULE ORAL DAILY
COMMUNITY
End: 2019-03-14

## 2017-12-28 RX ORDER — GABAPENTIN 300 MG/1
300 CAPSULE ORAL 2 TIMES DAILY
Qty: 180 CAPSULE | Refills: 1 | Status: SHIPPED | OUTPATIENT
Start: 2017-12-28 | End: 2020-04-14 | Stop reason: SDUPTHER

## 2017-12-28 ASSESSMENT — ENCOUNTER SYMPTOMS
COUGH: 0
VOMITING: 0
CONSTIPATION: 0
DIARRHEA: 0
NAUSEA: 0
SHORTNESS OF BREATH: 0
CHEST TIGHTNESS: 0

## 2017-12-28 NOTE — TELEPHONE ENCOUNTER
DOP advised of Dr. Valdez Friday response. Please send script for Gabapentin to [address].

## 2017-12-28 NOTE — PROGRESS NOTES
acetaminophen (TYLENOL) 325 MG tablet  Take 650 mg by mouth every 6 hours as needed for Pain             aspirin (ASPIRIN CHILDRENS) 81 MG chewable tablet  Take 1 tablet by mouth daily             calcium carbonate (CALCIUM 600) 600 MG TABS tablet  Take 1 tablet by mouth 2 times daily             docusate sodium (COLACE) 100 MG capsule  Take 1 capsule by mouth 2 times daily             furosemide (LASIX) 20 MG tablet  Take 1 tablet by mouth daily             gabapentin (NEURONTIN) 300 MG capsule  Take 1 capsule by mouth 2 times daily             gemfibrozil (LOPID) 600 MG tablet  Take 1 tablet by mouth 2 times daily (before meals)             hydrALAZINE (APRESOLINE) 100 MG tablet  Take 1 tablet by mouth 3 times daily             Incontinence Supply Disposable (INCONTINENCE BRIEF MEDIUM) MISC  three times per day as needed             isosorbide mononitrate (IMDUR) 30 MG extended release tablet  Take 2 tablets by mouth daily Dr. Angela Iyer - Cardio             lisinopril (PRINIVIL;ZESTRIL) 5 MG tablet  Take 1 tablet by mouth daily             magnesium hydroxide (MILK OF MAGNESIA) 400 MG/5ML suspension  Take 15 mLs by mouth daily as needed for Constipation or Heartburn             Multiple Vitamins-Minerals (CENTRUM SILVER) TABS  Take 1 tablet by mouth nightly             Nutritional Supplements (ENSURE PLUS) LIQD  Take 1 each by mouth daily             Omega-3 1000 MG CAPS  Take 1 capsule by mouth daily             rOPINIRole (REQUIP) 2 MG tablet  Take 1 tablet by mouth nightly             sertraline (ZOLOFT) 50 MG tablet  Take 1 tablet by mouth daily                   Medications marked \"taking\" at this time  Outpatient Prescriptions Marked as Taking for the 12/28/17 encounter (Office Visit) with Lisa Arguello CNP   Medication Sig Dispense Refill    gabapentin (NEURONTIN) 300 MG capsule Take 1 capsule by mouth 2 times daily 180 capsule 1    Omega-3 1000 MG CAPS Take 1 capsule by mouth daily      furosemide (LASIX) 20 MG tablet Take 1 tablet by mouth daily 30 tablet 1    isosorbide mononitrate (IMDUR) 30 MG extended release tablet Take 2 tablets by mouth daily Dr. Jose Enrique Polanco - Cardio 30 tablet 5    hydrALAZINE (APRESOLINE) 100 MG tablet Take 1 tablet by mouth 3 times daily 90 tablet 1    lisinopril (PRINIVIL;ZESTRIL) 5 MG tablet Take 1 tablet by mouth daily 30 tablet 1    calcium carbonate (CALCIUM 600) 600 MG TABS tablet Take 1 tablet by mouth 2 times daily      acetaminophen (TYLENOL) 325 MG tablet Take 650 mg by mouth every 6 hours as needed for Pain      sertraline (ZOLOFT) 50 MG tablet Take 1 tablet by mouth daily 30 tablet 5    aspirin (ASPIRIN CHILDRENS) 81 MG chewable tablet Take 1 tablet by mouth daily 30 tablet 3    Incontinence Supply Disposable (INCONTINENCE BRIEF MEDIUM) MISC three times per day as needed 100 each 11    Nutritional Supplements (ENSURE PLUS) LIQD Take 1 each by mouth daily 30 Can 11    gemfibrozil (LOPID) 600 MG tablet Take 1 tablet by mouth 2 times daily (before meals) 180 tablet 3    rOPINIRole (REQUIP) 2 MG tablet Take 1 tablet by mouth nightly 90 tablet 1    docusate sodium (COLACE) 100 MG capsule Take 1 capsule by mouth 2 times daily 60 capsule 5    magnesium hydroxide (MILK OF MAGNESIA) 400 MG/5ML suspension Take 15 mLs by mouth daily as needed for Constipation or Heartburn      Multiple Vitamins-Minerals (CENTRUM SILVER) TABS Take 1 tablet by mouth nightly 30 tablet 5        Medications patient taking as of now reconciled against medications ordered at time of hospital discharge. Vitals:    12/28/17 1358   BP: 100/60   Site: Right Arm   Position: Sitting   Cuff Size: Medium Adult   Pulse: 51   Resp: 12   SpO2: 91%   Weight: 155 lb (70.3 kg)     Body mass index is 21.62 kg/m².      Wt Readings from Last 3 Encounters:   12/28/17 155 lb (70.3 kg)   12/26/17 160 lb 4.4 oz (72.7 kg)   12/22/17 153 lb 6.4 oz (69.6 kg)     BP Readings from Last 3 Encounters: 12/28/17 100/60   12/27/17 (!) 147/62   12/22/17 (!) 148/80        Inpatient course: Discharge summary reviewed- see chart. Chief Complaint   Patient presents with    [facility]     12[facility], [facility]       HPI   Patient is here for hospital f/u from 12/10/17. Here with daughter who is helping with history due to patient's dementia. Treated for acute on chronic diastolic CHF. Lisinopril decreased due to kidney function. Also treated for bronchitis. Chest xray was negative for pneumonia. Completed cefepime. Restarting gabapentin tonight for right hip pain. Doing physical therapy 3 times per week. Taking tylenol for hip pain. Review of Systems   Constitutional: Negative for activity change and fever. Respiratory: Negative for cough, chest tightness and shortness of breath. Cardiovascular: Negative for chest pain and leg swelling. Gastrointestinal: Negative for constipation, diarrhea, nausea and vomiting. Musculoskeletal: Positive for arthralgias. Neurological: Negative for dizziness, syncope, weakness and headaches. Psychiatric/Behavioral: Negative for confusion. Physical Exam   Constitutional: He appears well-developed and well-nourished. No distress. HENT:   Head: Normocephalic and atraumatic. Eyes: EOM are normal.   Neck: Neck supple. Carotid bruit is not present. Cardiovascular: Normal rate and normal heart sounds. An irregularly irregular rhythm present. Exam reveals no gallop and no friction rub. No murmur heard. Pulmonary/Chest: Effort normal and breath sounds normal. No respiratory distress. Musculoskeletal: He exhibits no edema. Right ankle: He exhibits normal pulse. Left ankle: He exhibits normal pulse. Neurological: He is alert. Skin: Skin is warm and dry. Psychiatric: He has a normal mood and affect. His behavior is normal.   Nursing note and vitals reviewed.           Assessment/Plan:  Jacob Gracia was seen today for follow-up from

## 2018-01-11 RX ORDER — ISOSORBIDE MONONITRATE 30 MG/1
60 TABLET, EXTENDED RELEASE ORAL DAILY
Qty: 30 TABLET | Refills: 5 | Status: SHIPPED | OUTPATIENT
Start: 2018-01-11 | End: 2018-05-07 | Stop reason: SDUPTHER

## 2018-01-16 ENCOUNTER — TELEPHONE (OUTPATIENT)
Dept: FAMILY MEDICINE CLINIC | Age: 83
End: 2018-01-16

## 2018-01-16 NOTE — TELEPHONE ENCOUNTER
Phoenix King wants to give an update  When pt was released from Rehab 2 weeks ago he was 151lbs   Today he is Up to 160 in 2 weeks     Pt normal weight is around 160  His lasix was decreased in the rehab to 20 and may be cause of his weight loss. Now that he is out he is back to 40 and could be the reason for his weight gain. bp is high again. Pt was on  lisipril 40mg aday before rehab. He is  now only on 5mg day.   180/92 today  Pulse is at 46 (which is is norm)    Any questions call Selena

## 2018-01-16 NOTE — TELEPHONE ENCOUNTER
Selena denies edema or dyspnea. Advised of Cristina jj.  Orders faxed to [facility] at [phone]

## 2018-01-22 ENCOUNTER — TELEPHONE (OUTPATIENT)
Dept: FAMILY MEDICINE CLINIC | Age: 83
End: 2018-01-22

## 2018-01-26 ENCOUNTER — OFFICE VISIT (OUTPATIENT)
Dept: FAMILY MEDICINE CLINIC | Age: 83
End: 2018-01-26

## 2018-01-26 VITALS
WEIGHT: 168 LBS | RESPIRATION RATE: 18 BRPM | SYSTOLIC BLOOD PRESSURE: 190 MMHG | DIASTOLIC BLOOD PRESSURE: 60 MMHG | BODY MASS INDEX: 23.52 KG/M2 | HEIGHT: 71 IN | HEART RATE: 50 BPM | TEMPERATURE: 98.5 F

## 2018-01-26 DIAGNOSIS — J43.9 PULMONARY EMPHYSEMA, UNSPECIFIED EMPHYSEMA TYPE (HCC): ICD-10-CM

## 2018-01-26 DIAGNOSIS — I25.10 CORONARY ARTERY DISEASE INVOLVING NATIVE CORONARY ARTERY OF NATIVE HEART WITHOUT ANGINA PECTORIS: ICD-10-CM

## 2018-01-26 DIAGNOSIS — I48.20 CHRONIC ATRIAL FIBRILLATION (HCC): ICD-10-CM

## 2018-01-26 DIAGNOSIS — I10 ESSENTIAL HYPERTENSION: Primary | ICD-10-CM

## 2018-01-26 DIAGNOSIS — H61.23 BILATERAL IMPACTED CERUMEN: ICD-10-CM

## 2018-01-26 DIAGNOSIS — S76.211A GROIN STRAIN, RIGHT, INITIAL ENCOUNTER: ICD-10-CM

## 2018-01-26 PROBLEM — R09.02 HYPOXIA: Status: RESOLVED | Noted: 2017-12-02 | Resolved: 2018-01-26

## 2018-01-26 PROBLEM — S76.219A GROIN STRAIN: Status: ACTIVE | Noted: 2018-01-26

## 2018-01-26 PROBLEM — J18.9 HCAP (HEALTHCARE-ASSOCIATED PNEUMONIA): Status: RESOLVED | Noted: 2017-12-02 | Resolved: 2018-01-26

## 2018-01-26 PROCEDURE — 69209 REMOVE IMPACTED EAR WAX UNI: CPT | Performed by: FAMILY MEDICINE

## 2018-01-26 PROCEDURE — G8926 SPIRO NO PERF OR DOC: HCPCS | Performed by: FAMILY MEDICINE

## 2018-01-26 PROCEDURE — G8599 NO ASA/ANTIPLAT THER USE RNG: HCPCS | Performed by: FAMILY MEDICINE

## 2018-01-26 PROCEDURE — 4040F PNEUMOC VAC/ADMIN/RCVD: CPT | Performed by: FAMILY MEDICINE

## 2018-01-26 PROCEDURE — 1123F ACP DISCUSS/DSCN MKR DOCD: CPT | Performed by: FAMILY MEDICINE

## 2018-01-26 PROCEDURE — 3023F SPIROM DOC REV: CPT | Performed by: FAMILY MEDICINE

## 2018-01-26 PROCEDURE — 1036F TOBACCO NON-USER: CPT | Performed by: FAMILY MEDICINE

## 2018-01-26 PROCEDURE — G8420 CALC BMI NORM PARAMETERS: HCPCS | Performed by: FAMILY MEDICINE

## 2018-01-26 PROCEDURE — G8484 FLU IMMUNIZE NO ADMIN: HCPCS | Performed by: FAMILY MEDICINE

## 2018-01-26 PROCEDURE — G8427 DOCREV CUR MEDS BY ELIG CLIN: HCPCS | Performed by: FAMILY MEDICINE

## 2018-01-26 PROCEDURE — 99214 OFFICE O/P EST MOD 30 MIN: CPT | Performed by: FAMILY MEDICINE

## 2018-01-26 NOTE — PROGRESS NOTES
Value Date    WBC 9.1 2017    HGB 10.3 (L) 2017    HCT 30.4 (L) 2017    MCV 92.3 2017     2017     Lab Results   Component Value Date    ALT 26 2017    AST 47 (H) 2017    ALKPHOS 75 2017    BILITOT 0.3 2017     TSH (uIU/mL)   Date Value   2015 2.12     No results found for: LABA1C  The ASCVD Risk score (Pura Oneill, et al., 2013) failed to calculate for the following reasons: The 2013 ASCVD risk score is only valid for ages 36 to 78     Subjective:   HPI CHRONIC:   Chief Complaint   Patient presents with   [facility]    Patient here for follow-up of multiple chronic conditions includin. Essential hypertension    2. Groin strain, right, initial encounter    3. Pulmonary emphysema, unspecified emphysema type ([address]    4. Coronary artery disease involving native coronary artery of native heart without angina pectoris    5. Chronic atrial fibrillation (HCC)      Pain R groin/upper thigh x 2 months, had home PT but     ROS:  General ROS: fever? No,    night sweats? No  Respiratory ROS: cough? No   shortness of breath? No  Cardiovascular ROS:chest pain? No   shortness of breath with exertion? No     HISTORY:  Patient's medications, allergies, past medical, surgical, social and family histories were reviewed and updated as appropriate (See above). Objective:   PHYSICAL EXAM   BP (!) 190/60 (Site: Right Arm, Position: Sitting, Cuff Size: Medium Adult)   Pulse 50   Temp 98.5 °F (36.9 °C) (Oral)   Resp 18   Ht 5' 11\" (1.803 m)   Wt 168 lb (76.2 kg)   BMI 23.43 kg/m²   Blood pressure is Poor. BP Readings from Last 5 Encounters:   18 (!) 190/60   17 100/60   17 (!) 147/62   17 (!) 148/80   12/10/17 (!) 138/56     Weight is increased.    Wt Readings from Last 5 Encounters:   18 168 lb (76.2 kg)   17 155 lb (70.3 kg)   17 160 lb 4.4 oz (72.7 kg)   17 153 lb 6.4 oz (69.6 kg)   12/10/17 156 lb 1.4 oz

## 2018-01-30 ENCOUNTER — TELEPHONE (OUTPATIENT)
Dept: FAMILY MEDICINE CLINIC | Age: 83
End: 2018-01-30

## 2018-02-07 ENCOUNTER — TELEPHONE (OUTPATIENT)
Dept: FAMILY MEDICINE CLINIC | Age: 83
End: 2018-02-07

## 2018-02-14 RX ORDER — DOCUSATE SODIUM 100 MG/1
100 CAPSULE, LIQUID FILLED ORAL 2 TIMES DAILY
Qty: 60 CAPSULE | Refills: 5 | Status: ON HOLD | OUTPATIENT
Start: 2018-02-14 | End: 2021-06-27

## 2018-02-21 ENCOUNTER — OFFICE VISIT (OUTPATIENT)
Dept: FAMILY MEDICINE CLINIC | Age: 83
End: 2018-02-21

## 2018-02-21 VITALS
RESPIRATION RATE: 18 BRPM | SYSTOLIC BLOOD PRESSURE: 166 MMHG | WEIGHT: 168 LBS | HEIGHT: 71 IN | BODY MASS INDEX: 23.52 KG/M2 | DIASTOLIC BLOOD PRESSURE: 62 MMHG | TEMPERATURE: 97.8 F | HEART RATE: 50 BPM

## 2018-02-21 DIAGNOSIS — I27.81 COR PULMONALE, CHRONIC (HCC): ICD-10-CM

## 2018-02-21 DIAGNOSIS — J43.9 PULMONARY EMPHYSEMA, UNSPECIFIED EMPHYSEMA TYPE (HCC): ICD-10-CM

## 2018-02-21 DIAGNOSIS — F41.9 ANXIETY: ICD-10-CM

## 2018-02-21 DIAGNOSIS — I27.20 PULMONARY HYPERTENSION (HCC): ICD-10-CM

## 2018-02-21 DIAGNOSIS — Z00.00 ROUTINE GENERAL MEDICAL EXAMINATION AT A HEALTH CARE FACILITY: Primary | ICD-10-CM

## 2018-02-21 DIAGNOSIS — Z95.1 S/P CABG (CORONARY ARTERY BYPASS GRAFT): ICD-10-CM

## 2018-02-21 DIAGNOSIS — I10 ESSENTIAL HYPERTENSION: ICD-10-CM

## 2018-02-21 DIAGNOSIS — F03.90 DEMENTIA WITHOUT BEHAVIORAL DISTURBANCE, UNSPECIFIED DEMENTIA TYPE: ICD-10-CM

## 2018-02-21 DIAGNOSIS — R42 DIZZINESS: ICD-10-CM

## 2018-02-21 DIAGNOSIS — R73.9 HYPERGLYCEMIA: ICD-10-CM

## 2018-02-21 DIAGNOSIS — I48.20 CHRONIC ATRIAL FIBRILLATION (HCC): ICD-10-CM

## 2018-02-21 DIAGNOSIS — E78.5 HYPERLIPIDEMIA LDL GOAL <130: ICD-10-CM

## 2018-02-21 LAB
ANION GAP SERPL CALCULATED.3IONS-SCNC: 16 MMOL/L (ref 3–16)
BASOPHILS ABSOLUTE: 0 K/UL (ref 0–0.2)
BASOPHILS RELATIVE PERCENT: 0.5 %
BUN BLDV-MCNC: 31 MG/DL (ref 7–20)
CALCIUM SERPL-MCNC: 9.4 MG/DL (ref 8.3–10.6)
CHLORIDE BLD-SCNC: 99 MMOL/L (ref 99–110)
CO2: 23 MMOL/L (ref 21–32)
CREAT SERPL-MCNC: 1.4 MG/DL (ref 0.8–1.3)
EOSINOPHILS ABSOLUTE: 0 K/UL (ref 0–0.6)
EOSINOPHILS RELATIVE PERCENT: 0.6 %
GFR AFRICAN AMERICAN: 57
GFR NON-AFRICAN AMERICAN: 47
GLUCOSE BLD-MCNC: 122 MG/DL (ref 70–99)
HCT VFR BLD CALC: 33.7 % (ref 40.5–52.5)
HEMOGLOBIN: 11.2 G/DL (ref 13.5–17.5)
LYMPHOCYTES ABSOLUTE: 2.1 K/UL (ref 1–5.1)
LYMPHOCYTES RELATIVE PERCENT: 25.8 %
MCH RBC QN AUTO: 31.7 PG (ref 26–34)
MCHC RBC AUTO-ENTMCNC: 33.3 G/DL (ref 31–36)
MCV RBC AUTO: 95.2 FL (ref 80–100)
MONOCYTES ABSOLUTE: 0.6 K/UL (ref 0–1.3)
MONOCYTES RELATIVE PERCENT: 7.7 %
NEUTROPHILS ABSOLUTE: 5.2 K/UL (ref 1.7–7.7)
NEUTROPHILS RELATIVE PERCENT: 65.4 %
PDW BLD-RTO: 14.6 % (ref 12.4–15.4)
PLATELET # BLD: 172 K/UL (ref 135–450)
PMV BLD AUTO: 7.2 FL (ref 5–10.5)
POTASSIUM SERPL-SCNC: 5.5 MMOL/L (ref 3.5–5.1)
RBC # BLD: 3.54 M/UL (ref 4.2–5.9)
SODIUM BLD-SCNC: 138 MMOL/L (ref 136–145)
WBC # BLD: 8 K/UL (ref 4–11)

## 2018-02-21 PROCEDURE — G8420 CALC BMI NORM PARAMETERS: HCPCS | Performed by: FAMILY MEDICINE

## 2018-02-21 PROCEDURE — G8427 DOCREV CUR MEDS BY ELIG CLIN: HCPCS | Performed by: FAMILY MEDICINE

## 2018-02-21 PROCEDURE — 99214 OFFICE O/P EST MOD 30 MIN: CPT | Performed by: FAMILY MEDICINE

## 2018-02-21 PROCEDURE — 1123F ACP DISCUSS/DSCN MKR DOCD: CPT | Performed by: FAMILY MEDICINE

## 2018-02-21 PROCEDURE — G8926 SPIRO NO PERF OR DOC: HCPCS | Performed by: FAMILY MEDICINE

## 2018-02-21 PROCEDURE — 3023F SPIROM DOC REV: CPT | Performed by: FAMILY MEDICINE

## 2018-02-21 PROCEDURE — 4040F PNEUMOC VAC/ADMIN/RCVD: CPT | Performed by: FAMILY MEDICINE

## 2018-02-21 PROCEDURE — 1036F TOBACCO NON-USER: CPT | Performed by: FAMILY MEDICINE

## 2018-02-21 PROCEDURE — G8599 NO ASA/ANTIPLAT THER USE RNG: HCPCS | Performed by: FAMILY MEDICINE

## 2018-02-21 PROCEDURE — G0444 DEPRESSION SCREEN ANNUAL: HCPCS | Performed by: FAMILY MEDICINE

## 2018-02-21 PROCEDURE — G8484 FLU IMMUNIZE NO ADMIN: HCPCS | Performed by: FAMILY MEDICINE

## 2018-02-21 PROCEDURE — G0439 PPPS, SUBSEQ VISIT: HCPCS | Performed by: FAMILY MEDICINE

## 2018-02-21 RX ORDER — FUROSEMIDE 20 MG/1
40 TABLET ORAL DAILY
Qty: 60 TABLET | Refills: 1 | Status: SHIPPED | OUTPATIENT
Start: 2018-02-21 | End: 2018-04-17 | Stop reason: DRUGHIGH

## 2018-02-21 ASSESSMENT — LIFESTYLE VARIABLES
HOW OFTEN DO YOU HAVE A DRINK CONTAINING ALCOHOL: 1
HOW OFTEN DURING THE LAST YEAR HAVE YOU HAD A FEELING OF GUILT OR REMORSE AFTER DRINKING: 0
HOW OFTEN DURING THE LAST YEAR HAVE YOU FOUND THAT YOU WERE NOT ABLE TO STOP DRINKING ONCE YOU HAD STARTED: 0
HAS A RELATIVE, FRIEND, DOCTOR, OR ANOTHER HEALTH PROFESSIONAL EXPRESSED CONCERN ABOUT YOUR DRINKING OR SUGGESTED YOU CUT DOWN: 0
HOW OFTEN DURING THE LAST YEAR HAVE YOU FAILED TO DO WHAT WAS NORMALLY EXPECTED FROM YOU BECAUSE OF DRINKING: 0
HOW OFTEN DURING THE LAST YEAR HAVE YOU BEEN UNABLE TO REMEMBER WHAT HAPPENED THE NIGHT BEFORE BECAUSE YOU HAD BEEN DRINKING: 0
HOW OFTEN DURING THE LAST YEAR HAVE YOU NEEDED AN ALCOHOLIC DRINK FIRST THING IN THE MORNING TO GET YOURSELF GOING AFTER A NIGHT OF HEAVY DRINKING: 0
AUDIT TOTAL SCORE: 1
HOW MANY STANDARD DRINKS CONTAINING ALCOHOL DO YOU HAVE ON A TYPICAL DAY: 0
HOW OFTEN DO YOU HAVE SIX OR MORE DRINKS ON ONE OCCASION: 0
AUDIT-C TOTAL SCORE: 1
HAVE YOU OR SOMEONE ELSE BEEN INJURED AS A RESULT OF YOUR DRINKING: 0

## 2018-02-21 ASSESSMENT — ENCOUNTER SYMPTOMS
SHORTNESS OF BREATH: 1
GASTROINTESTINAL NEGATIVE: 1

## 2018-02-21 ASSESSMENT — ANXIETY QUESTIONNAIRES: GAD7 TOTAL SCORE: 1

## 2018-02-21 NOTE — PROGRESS NOTES
Medicare Annual Wellness Visit  Name: Wynelle Frankel  YOB: 1926  Age: 80 y.o.   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, seeds    Colon polyp- last colon 5/22/09    Screening for other and unspecified cardiovascular conditions- nl GERRY 3/21/11    Osteoarthritis, generalized    Depression    Hyperlipidemia LDL goal <130 due to age   [facility] History of TIAs    Dry eye    Needs flu shot    RLS (restless legs syndrome)    Medicare annual wellness visit, subsequent    Hyperglycemia    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    Cor pulmonale, chronic (HCC)    Pulmonary hypertension    Permanent atrial fibrillation (HCC)    S/P CABG (coronary artery bypass graft)    Bradycardia- avoid BB, CCB    Urinary retention    Chronic atrial fibrillation ([facility]    Groin strain     Health Maintenance   Topic Date Due    Shingles Vaccine (1 of 2 - 2 Dose Series) 05/16/1976    DTaP/Tdap/Td vaccine (2 - Td) 05/15/2017    Potassium monitoring  12/28/2018    Creatinine monitoring  12/28/2018    Flu vaccine  Completed    Pneumococcal low/med risk  Completed      Chief Complaint:   Wynelle Frankel is a 80 y.o. male who presents for Medicare Annual Wellness Visit and check-up for:      ·  Chronic atrial fibrillation (HCC)    ·  Cor pulmonale, chronic (HCC)    ·  Pulmonary hypertension    ·  Hyperglycemia    ·  S/P CABG (coronary artery bypass graft)    ·  Pulmonary emphysema, unspecified Social History    Marital status:      Spouse name: N/A    Number of children: 3    Years of education: N/A     Occupational History                                   Social History Main Topics    Smoking status: Former Smoker     Quit date: 1/1/1960    Smokeless tobacco: Never Used      Comment: Advised not to resume    Alcohol use No    Drug use: No    Sexual activity: Not Currently     Partners: Female      Comment:      Other Topics Concern    None     Social History Narrative    Lives in assisted living at [facility]. Seatbelt use: Always. Living will: yes, copy on file     Health Risk Assessment:   Please see Screenings under MA note  General Health Risk Interventions:  · Fatigue: see plans  · Social isolation: patient declines any further intervention for this issue     Wt Readings from Last 5 Encounters:   02/21/18 168 lb (76.2 kg)   01/26/18 168 lb (76.2 kg)   12/28/17 155 lb (70.3 kg)   12/26/17 160 lb 4.4 oz (72.7 kg)   12/22/17 153 lb 6.4 oz (69.6 kg)      Body mass index is 23.43 kg/m².    Health Habits/Nutrition Interventions:  · None indicated     Hearing/Vision Interventions:  · Hearing concerns:  patient declines any further evaluation/treatment for hearing issues; irrigate L ear today     Safety Interventions:  · Home safety tips provided     ADL Interventions:  · None indicated    Personalized Preventive Plan   Current Health Maintenance Status  Immunization History   Administered Date(s) Administered    Influenza Virus Vaccine 09/13/2012    Influenza Whole 09/01/2009    Influenza, High Dose 09/12/2014, 11/27/2015, 09/27/2016, 11/01/2017    Pneumococcal 13-valent Conjugate ([id number]) 11/27/2015    Pneumococcal Conjugate 7-valent 11/21/2005    Pneumococcal Polysaccharide ([id number]) 11/21/2005    Tdap (Boostrix, Adacel) 05/15/2007     Health Maintenance   Topic Date Due    Shingles Vaccine (1 of 2 - 2 Dose Series) 05/16/1976    DTaP/Tdap/Td vaccine (2 - Resp: 18   Temp: 97.8 °F (36.6 °C)   TempSrc: Oral   Weight: 168 lb (76.2 kg)   Height: 5' 11\" (1.803 m)     Body mass index is 23.43 kg/m². GENERAL: well-developed, well-nourished, alert, no distress, calm   EYES: negative findings: lids and lashes normal and conjunctivae and sclerae normal  ENT: normal TM and external ear canal right ear and abnormal external canal left ear - cerumen removed by irrigation  · External nose and ears appear normal  · Pharynx: normal. Exudates: None  · Lips, mucosa, and tongue normal and teeth normal  · Hearing grossly normal.  Patient is currently wearing a hearing aid in the bilateral ear. NECK: No adenopathy, supple, symmetrical, trachea midline  · Thyroid not enlarged, symmetric, no tenderness/mass/nodules  · no cervical nodes, no supraclavicular nodes  LUNGS:  Breathing unlabored  · clear to auscultation bilaterally and good air movement  CARDIOVASC: regular rate and rhythm, S1, S2 normal, no murmur, click, rub or gallop  LEGS:  Lower extremity edema: 2+ and pitting  ankle  · No carotid bruits  ABDOMEN: Soft, non-tender, no masses  · No hepatosplenomegaly  · No hernias noted. Exam limited by N/A  SKIN: warm and dry  · No rashes or suspicious lesions  PSYCH:  Alert and oriented  · Normal reasoning, insight good  · Facial expressions full, mood appropriate  · No memory disturbance noted  MUSCULOSKEL:  No significant finger or nail findings  · Spine symmetric, no deformities, no kyphosis      Assessment and Plan:     1. Routine general medical examination at a health care facility     2. Chronic atrial fibrillation (HCC)  Stable with current medications. 3. Cor pulmonale, chronic (HCC)  Increased SOB with edema. Will increase Lasix. Home nurse following  CBC Auto Differential   4. Pulmonary hypertension  See above   5. Hyperglycemia     6. S/P CABG (coronary artery bypass graft)     7.  Pulmonary emphysema, unspecified emphysema type ([address]  Stable with current

## 2018-02-21 NOTE — PATIENT INSTRUCTIONS
cataracts or hip problems. What can I do to lower my risk of falling? Most falls happen in the home. Consider the following tips to make your home safe: Make sure that you have good lighting in your home. A well lit home will help you avoid tripping over objects that are not easy to see. Put night lights in your bedroom, hallways, stairs and bathrooms. Rugs should be firmly fastened to the floor or have nonskid backing. Loose ends should be tacked down. Electrical cords should not be lying on the floor in walking areas. Put hand rails in your bathroom for bath, shower and toilet use. Have rails on both sides of your stairs for support. In the kitchen, make sure items are within easy reach. Don't store things too high or too low. Then you won't have to use a stepladder or a stool to reach them. It's also a good idea to avoid storing things too low, so you won't have to bend down to get them. Wear shoes with firm nonskid soles. Avoid wearing loose-fitting slippers that could cause you to trip. What else can I do? Take good care of your body. Try to stay healthy by following these tips:  See your eye doctor once a year. Cataracts and other eye diseases that cause you not to see well, can lead to falls. Get regular physical activity to keep your bones and muscles strong. Take good care of your feet. If you have pain in your feet or if you have large, thick nails and corns, have your doctor look at your feet. Talk to your doctor about any side effects you may have from your medicines. Problems caused by side effects from medicine are a common cause of falls. The more medicines you take, the greater your risk of falling. Talk to your doctor if you have dizzy spells. If your doctor suggests that you use a cane or a walker to help you walk, be sure to use it. This will give you extra stability when walking and will help you avoid falls. Don't smoke.    Limit alcohol to no more than 2 drinks

## 2018-02-28 RX ORDER — CYANOCOBALAMIN (VITAMIN B-12) 1000 MCG
1 TABLET, EXTENDED RELEASE ORAL 2 TIMES DAILY WITH MEALS
Qty: 120 TABLET | COMMUNITY
Start: 2018-02-28 | End: 2018-05-07 | Stop reason: SDUPTHER

## 2018-03-06 ENCOUNTER — TELEPHONE (OUTPATIENT)
Dept: FAMILY MEDICINE CLINIC | Age: 83
End: 2018-03-06

## 2018-03-06 DIAGNOSIS — I10 ESSENTIAL HYPERTENSION: ICD-10-CM

## 2018-03-06 RX ORDER — LISINOPRIL 10 MG/1
10 TABLET ORAL DAILY
Qty: 30 TABLET | Refills: 1 | Status: SHIPPED | OUTPATIENT
Start: 2018-03-06 | End: 2018-03-07 | Stop reason: SDUPTHER

## 2018-03-06 NOTE — TELEPHONE ENCOUNTER
INCREASE lisinopril to 10 mg daily. Call with BP in a week. Get renal functions in 2 weeks.  (Home nurse)

## 2018-03-07 RX ORDER — LISINOPRIL 20 MG/1
20 TABLET ORAL DAILY
Qty: 30 TABLET | Refills: 1 | Status: SHIPPED | OUTPATIENT
Start: 2018-03-07 | End: 2018-05-07 | Stop reason: SDUPTHER

## 2018-03-09 ENCOUNTER — TELEPHONE (OUTPATIENT)
Dept: FAMILY MEDICINE CLINIC | Age: 83
End: 2018-03-09

## 2018-03-09 RX ORDER — CLONIDINE HYDROCHLORIDE 0.1 MG/1
0.1 TABLET ORAL 2 TIMES DAILY
Qty: 60 TABLET | Refills: 3 | Status: SHIPPED | OUTPATIENT
Start: 2018-03-09 | End: 2018-05-31 | Stop reason: SDUPTHER

## 2018-03-09 NOTE — TELEPHONE ENCOUNTER
DOP is calling to say that she will like for  to stop Rx the pt with the Clonidine Patch because the pt insurance would not cover it. DOP will like the pt to have this in a pill form if possible. Please advise SADA    CB# [phone]  CB# [phone]    Thanks.

## 2018-03-19 RX ORDER — ROPINIROLE 2 MG/1
2 TABLET, FILM COATED ORAL NIGHTLY
Qty: 90 TABLET | Refills: 1 | Status: SHIPPED | OUTPATIENT
Start: 2018-03-19 | End: 2018-07-30 | Stop reason: SDUPTHER

## 2018-04-03 ENCOUNTER — TELEPHONE (OUTPATIENT)
Dept: FAMILY MEDICINE CLINIC | Age: 83
End: 2018-04-03

## 2018-04-10 LAB
BUN / CREAT RATIO: 31 (CALC) (ref 6–22)
BUN BLDV-MCNC: 54 MG/DL (ref 7–25)
CALCIUM SERPL-MCNC: 9.1 MG/DL (ref 8.6–10.3)
CHLORIDE BLD-SCNC: 101 MMOL/L (ref 98–110)
CO2: 27 MMOL/L (ref 20–31)
CREAT SERPL-MCNC: 1.76 MG/DL (ref 0.7–1.11)
GFR AFRICAN AMERICAN: 38 ML/MIN/1.73M2
GFR SERPL CREATININE-BSD FRML MDRD: 33 ML/MIN/1.73M2
GLUCOSE BLD-MCNC: 150 MG/DL (ref 65–99)
POTASSIUM SERPL-SCNC: 4.4 MMOL/L (ref 3.5–5.3)
SODIUM BLD-SCNC: 139 MMOL/L (ref 135–146)

## 2018-04-17 ENCOUNTER — TELEPHONE (OUTPATIENT)
Dept: FAMILY MEDICINE CLINIC | Age: 83
End: 2018-04-17

## 2018-04-17 RX ORDER — FUROSEMIDE 20 MG/1
40-60 TABLET ORAL DAILY
Qty: 60 TABLET | Refills: 1 | Status: SHIPPED | OUTPATIENT
Start: 2018-04-17 | End: 2018-04-17 | Stop reason: DRUGHIGH

## 2018-04-17 RX ORDER — FUROSEMIDE 20 MG/1
40-60 TABLET ORAL DAILY
Qty: 60 TABLET | Refills: 1 | Status: SHIPPED | OUTPATIENT
Start: 2018-04-17 | End: 2018-04-23 | Stop reason: SDUPTHER

## 2018-04-23 RX ORDER — FUROSEMIDE 20 MG/1
40-60 TABLET ORAL DAILY
Qty: 60 TABLET | Refills: 2 | Status: SHIPPED | OUTPATIENT
Start: 2018-04-23 | End: 2018-05-15 | Stop reason: SDUPTHER

## 2018-05-07 DIAGNOSIS — I10 ESSENTIAL HYPERTENSION: ICD-10-CM

## 2018-05-08 RX ORDER — LISINOPRIL 20 MG/1
20 TABLET ORAL DAILY
Qty: 30 TABLET | Refills: 1 | Status: SHIPPED | OUTPATIENT
Start: 2018-05-08 | End: 2018-12-28 | Stop reason: DRUGHIGH

## 2018-05-08 RX ORDER — CYANOCOBALAMIN (VITAMIN B-12) 1000 MCG
2 TABLET, EXTENDED RELEASE ORAL 2 TIMES DAILY WITH MEALS
Qty: 120 TABLET | Refills: 1 | Status: SHIPPED | OUTPATIENT
Start: 2018-05-08 | End: 2019-11-23

## 2018-05-08 RX ORDER — ISOSORBIDE MONONITRATE 30 MG/1
60 TABLET, EXTENDED RELEASE ORAL DAILY
Qty: 30 TABLET | Refills: 5 | Status: SHIPPED | OUTPATIENT
Start: 2018-05-08 | End: 2018-07-30 | Stop reason: SDUPTHER

## 2018-05-15 ENCOUNTER — TELEPHONE (OUTPATIENT)
Dept: FAMILY MEDICINE CLINIC | Age: 83
End: 2018-05-15

## 2018-05-15 ENCOUNTER — TELEPHONE (OUTPATIENT)
Dept: CARDIOLOGY CLINIC | Age: 83
End: 2018-05-15

## 2018-05-15 ENCOUNTER — OFFICE VISIT (OUTPATIENT)
Dept: FAMILY MEDICINE CLINIC | Age: 83
End: 2018-05-15

## 2018-05-15 VITALS
DIASTOLIC BLOOD PRESSURE: 60 MMHG | HEART RATE: 32 BPM | WEIGHT: 172.2 LBS | BODY MASS INDEX: 24.11 KG/M2 | TEMPERATURE: 97.6 F | SYSTOLIC BLOOD PRESSURE: 116 MMHG | HEIGHT: 71 IN | RESPIRATION RATE: 16 BRPM | OXYGEN SATURATION: 96 %

## 2018-05-15 DIAGNOSIS — I10 ESSENTIAL HYPERTENSION: ICD-10-CM

## 2018-05-15 DIAGNOSIS — I10 ESSENTIAL HYPERTENSION: Primary | ICD-10-CM

## 2018-05-15 DIAGNOSIS — R73.9 HYPERGLYCEMIA: ICD-10-CM

## 2018-05-15 DIAGNOSIS — F03.90 DEMENTIA WITHOUT BEHAVIORAL DISTURBANCE, UNSPECIFIED DEMENTIA TYPE: ICD-10-CM

## 2018-05-15 DIAGNOSIS — I48.20 CHRONIC ATRIAL FIBRILLATION (HCC): ICD-10-CM

## 2018-05-15 DIAGNOSIS — R00.1 BRADYCARDIA: Primary | ICD-10-CM

## 2018-05-15 DIAGNOSIS — R00.1 BRADYCARDIA: ICD-10-CM

## 2018-05-15 DIAGNOSIS — I25.10 CORONARY ARTERY DISEASE INVOLVING NATIVE CORONARY ARTERY OF NATIVE HEART WITHOUT ANGINA PECTORIS: ICD-10-CM

## 2018-05-15 DIAGNOSIS — I27.81 COR PULMONALE, CHRONIC (HCC): ICD-10-CM

## 2018-05-15 DIAGNOSIS — E78.5 HYPERLIPIDEMIA LDL GOAL <130: ICD-10-CM

## 2018-05-15 LAB
ANION GAP SERPL CALCULATED.3IONS-SCNC: 13 MMOL/L (ref 3–16)
BUN BLDV-MCNC: 51 MG/DL (ref 7–20)
CALCIUM SERPL-MCNC: 9.4 MG/DL (ref 8.3–10.6)
CHLORIDE BLD-SCNC: 97 MMOL/L (ref 99–110)
CO2: 26 MMOL/L (ref 21–32)
CREAT SERPL-MCNC: 1.9 MG/DL (ref 0.8–1.3)
GFR AFRICAN AMERICAN: 40
GFR NON-AFRICAN AMERICAN: 33
GLUCOSE BLD-MCNC: 106 MG/DL (ref 70–99)
POTASSIUM SERPL-SCNC: 5 MMOL/L (ref 3.5–5.1)
SODIUM BLD-SCNC: 136 MMOL/L (ref 136–145)

## 2018-05-15 PROCEDURE — 99214 OFFICE O/P EST MOD 30 MIN: CPT | Performed by: FAMILY MEDICINE

## 2018-05-15 PROCEDURE — G8427 DOCREV CUR MEDS BY ELIG CLIN: HCPCS | Performed by: FAMILY MEDICINE

## 2018-05-15 PROCEDURE — 1123F ACP DISCUSS/DSCN MKR DOCD: CPT | Performed by: FAMILY MEDICINE

## 2018-05-15 PROCEDURE — 1036F TOBACCO NON-USER: CPT | Performed by: FAMILY MEDICINE

## 2018-05-15 PROCEDURE — G8599 NO ASA/ANTIPLAT THER USE RNG: HCPCS | Performed by: FAMILY MEDICINE

## 2018-05-15 PROCEDURE — 4040F PNEUMOC VAC/ADMIN/RCVD: CPT | Performed by: FAMILY MEDICINE

## 2018-05-15 PROCEDURE — G8420 CALC BMI NORM PARAMETERS: HCPCS | Performed by: FAMILY MEDICINE

## 2018-05-15 RX ORDER — POLYETHYLENE GLYCOL 3350 17 G/17G
17 POWDER, FOR SOLUTION ORAL DAILY
Qty: 510 G | Refills: 5 | Status: SHIPPED | OUTPATIENT
Start: 2018-05-15 | End: 2018-06-14

## 2018-05-15 RX ORDER — FUROSEMIDE 20 MG/1
40-60 TABLET ORAL DAILY
Qty: 60 TABLET | Refills: 5 | Status: SHIPPED | OUTPATIENT
Start: 2018-05-15 | End: 2019-08-30 | Stop reason: SDUPTHER

## 2018-05-16 LAB
ESTIMATED AVERAGE GLUCOSE: 142.7 MG/DL
HBA1C MFR BLD: 6.6 %

## 2018-05-18 ENCOUNTER — TELEPHONE (OUTPATIENT)
Dept: CARDIOLOGY CLINIC | Age: 83
End: 2018-05-18

## 2018-05-24 ENCOUNTER — TELEPHONE (OUTPATIENT)
Dept: CARDIOLOGY CLINIC | Age: 83
End: 2018-05-24

## 2018-05-31 ENCOUNTER — OFFICE VISIT (OUTPATIENT)
Dept: CARDIOLOGY CLINIC | Age: 83
End: 2018-05-31

## 2018-05-31 VITALS
DIASTOLIC BLOOD PRESSURE: 54 MMHG | WEIGHT: 167 LBS | BODY MASS INDEX: 23.91 KG/M2 | HEART RATE: 32 BPM | SYSTOLIC BLOOD PRESSURE: 132 MMHG | HEIGHT: 70 IN | OXYGEN SATURATION: 97 %

## 2018-05-31 DIAGNOSIS — I48.20 CHRONIC ATRIAL FIBRILLATION (HCC): ICD-10-CM

## 2018-05-31 DIAGNOSIS — I50.810 RIGHT-SIDED HEART FAILURE (HCC): ICD-10-CM

## 2018-05-31 DIAGNOSIS — R00.1 BRADYCARDIA: Primary | ICD-10-CM

## 2018-05-31 DIAGNOSIS — I27.81 COR PULMONALE (HCC): ICD-10-CM

## 2018-05-31 DIAGNOSIS — I25.10 CORONARY ARTERY DISEASE INVOLVING NATIVE CORONARY ARTERY OF NATIVE HEART WITHOUT ANGINA PECTORIS: ICD-10-CM

## 2018-05-31 DIAGNOSIS — I10 ESSENTIAL HYPERTENSION: ICD-10-CM

## 2018-05-31 PROCEDURE — 93000 ELECTROCARDIOGRAM COMPLETE: CPT | Performed by: NURSE PRACTITIONER

## 2018-05-31 PROCEDURE — G8420 CALC BMI NORM PARAMETERS: HCPCS | Performed by: NURSE PRACTITIONER

## 2018-05-31 PROCEDURE — 1123F ACP DISCUSS/DSCN MKR DOCD: CPT | Performed by: NURSE PRACTITIONER

## 2018-05-31 PROCEDURE — 4040F PNEUMOC VAC/ADMIN/RCVD: CPT | Performed by: NURSE PRACTITIONER

## 2018-05-31 PROCEDURE — 99214 OFFICE O/P EST MOD 30 MIN: CPT | Performed by: NURSE PRACTITIONER

## 2018-05-31 PROCEDURE — 1036F TOBACCO NON-USER: CPT | Performed by: NURSE PRACTITIONER

## 2018-05-31 PROCEDURE — G8427 DOCREV CUR MEDS BY ELIG CLIN: HCPCS | Performed by: NURSE PRACTITIONER

## 2018-05-31 PROCEDURE — G8599 NO ASA/ANTIPLAT THER USE RNG: HCPCS | Performed by: NURSE PRACTITIONER

## 2018-05-31 RX ORDER — CLONIDINE HYDROCHLORIDE 0.1 MG/1
TABLET ORAL
Qty: 60 TABLET | Refills: 3
Start: 2018-05-31 | End: 2018-06-14 | Stop reason: ALTCHOICE

## 2018-06-14 ENCOUNTER — OFFICE VISIT (OUTPATIENT)
Dept: CARDIOLOGY CLINIC | Age: 83
End: 2018-06-14

## 2018-06-14 VITALS
OXYGEN SATURATION: 97 % | DIASTOLIC BLOOD PRESSURE: 60 MMHG | WEIGHT: 163 LBS | HEART RATE: 54 BPM | SYSTOLIC BLOOD PRESSURE: 118 MMHG | BODY MASS INDEX: 23.34 KG/M2 | HEIGHT: 70 IN

## 2018-06-14 DIAGNOSIS — I48.20 CHRONIC ATRIAL FIBRILLATION (HCC): ICD-10-CM

## 2018-06-14 DIAGNOSIS — R00.1 BRADYCARDIA: Primary | ICD-10-CM

## 2018-06-14 DIAGNOSIS — I25.10 CORONARY ARTERY DISEASE INVOLVING NATIVE CORONARY ARTERY OF NATIVE HEART WITHOUT ANGINA PECTORIS: ICD-10-CM

## 2018-06-14 PROCEDURE — 4040F PNEUMOC VAC/ADMIN/RCVD: CPT | Performed by: NURSE PRACTITIONER

## 2018-06-14 PROCEDURE — 1123F ACP DISCUSS/DSCN MKR DOCD: CPT | Performed by: NURSE PRACTITIONER

## 2018-06-14 PROCEDURE — G8599 NO ASA/ANTIPLAT THER USE RNG: HCPCS | Performed by: NURSE PRACTITIONER

## 2018-06-14 PROCEDURE — 1036F TOBACCO NON-USER: CPT | Performed by: NURSE PRACTITIONER

## 2018-06-14 PROCEDURE — G8420 CALC BMI NORM PARAMETERS: HCPCS | Performed by: NURSE PRACTITIONER

## 2018-06-14 PROCEDURE — 99213 OFFICE O/P EST LOW 20 MIN: CPT | Performed by: NURSE PRACTITIONER

## 2018-06-14 PROCEDURE — G8427 DOCREV CUR MEDS BY ELIG CLIN: HCPCS | Performed by: NURSE PRACTITIONER

## 2018-07-06 ENCOUNTER — TELEPHONE (OUTPATIENT)
Dept: FAMILY MEDICINE CLINIC | Age: 83
End: 2018-07-06

## 2018-07-06 ENCOUNTER — TELEPHONE (OUTPATIENT)
Dept: CARDIOLOGY CLINIC | Age: 83
End: 2018-07-06

## 2018-07-06 NOTE — TELEPHONE ENCOUNTER
He has been seen by cardiology on separate occasions with his son present d/t low heart rate. He is completely asymptomatic and not on any rate slowing meds. He has expressed that he is not interested in a pacemaker and so will continue with expectant observation.  He is scheduled to follow up later this year in office and has been advised to call if he develops dizziness, lightheadedness, fatigue, etc.      May want to make Home Health aware (RN has been calling PCP office)

## 2018-07-06 NOTE — TELEPHONE ENCOUNTER
Christiana Ward from [facility] connection is calling to say that the pt was receirted for 12 more weeks the plan of care will be submitted over and his pulse was 36 today and BP is 166/76. She called the pt Cardiologist for his Vital as well.     Please advise  CB# [phone]    thanks

## 2018-07-06 NOTE — TELEPHONE ENCOUNTER
Mirza Pod: See below. Rabia Siegel has reached out to [address] [place] [address]    He has been seen by cardiology on separate occasions with his son present d/t low heart rate. He is completely asymptomatic and not on any rate slowing meds. He has expressed that he is not interested in a pacemaker and so will continue with expectant observation.  He is scheduled to follow up later this year in office and has been advised to call if he develops dizziness, lightheadedness, fatigue, etc.       May want to make Home Health aware (RN has been calling PCP office)

## 2018-07-30 RX ORDER — MULTIVIT WITH MINERALS/LUTEIN
1 TABLET ORAL NIGHTLY
Qty: 30 TABLET | Refills: 2 | Status: SHIPPED | OUTPATIENT
Start: 2018-07-30 | End: 2018-11-21 | Stop reason: SDUPTHER

## 2018-07-30 RX ORDER — ISOSORBIDE MONONITRATE 30 MG/1
60 TABLET, EXTENDED RELEASE ORAL DAILY
Qty: 60 TABLET | Refills: 2 | Status: SHIPPED | OUTPATIENT
Start: 2018-07-30 | End: 2019-03-14 | Stop reason: SDUPTHER

## 2018-07-30 RX ORDER — ROPINIROLE 2 MG/1
2 TABLET, FILM COATED ORAL NIGHTLY
Qty: 90 TABLET | Refills: 0 | Status: SHIPPED | OUTPATIENT
Start: 2018-07-30 | End: 2019-05-24 | Stop reason: SDUPTHER

## 2018-08-02 ENCOUNTER — TELEPHONE (OUTPATIENT)
Dept: FAMILY MEDICINE CLINIC | Age: 83
End: 2018-08-02

## 2018-08-21 ENCOUNTER — OFFICE VISIT (OUTPATIENT)
Dept: FAMILY MEDICINE CLINIC | Age: 83
End: 2018-08-21

## 2018-08-21 VITALS
HEART RATE: 44 BPM | RESPIRATION RATE: 18 BRPM | WEIGHT: 170 LBS | TEMPERATURE: 97.8 F | OXYGEN SATURATION: 98 % | DIASTOLIC BLOOD PRESSURE: 60 MMHG | BODY MASS INDEX: 24.34 KG/M2 | SYSTOLIC BLOOD PRESSURE: 134 MMHG | HEIGHT: 70 IN

## 2018-08-21 DIAGNOSIS — I25.10 CORONARY ARTERY DISEASE INVOLVING NATIVE CORONARY ARTERY OF NATIVE HEART WITHOUT ANGINA PECTORIS: ICD-10-CM

## 2018-08-21 DIAGNOSIS — I25.10 CORONARY ARTERY DISEASE INVOLVING NATIVE CORONARY ARTERY OF NATIVE HEART WITHOUT ANGINA PECTORIS: Primary | ICD-10-CM

## 2018-08-21 DIAGNOSIS — I10 ESSENTIAL HYPERTENSION: ICD-10-CM

## 2018-08-21 DIAGNOSIS — J43.9 PULMONARY EMPHYSEMA, UNSPECIFIED EMPHYSEMA TYPE (HCC): ICD-10-CM

## 2018-08-21 DIAGNOSIS — N18.30 CHRONIC RENAL DISEASE, STAGE 3, MODERATELY DECREASED GLOMERULAR FILTRATION RATE BETWEEN 30-59 ML/MIN/1.73 SQUARE METER (HCC): ICD-10-CM

## 2018-08-21 DIAGNOSIS — I27.81 COR PULMONALE, CHRONIC (HCC): ICD-10-CM

## 2018-08-21 DIAGNOSIS — Z91.81 AT HIGH RISK FOR FALLS: ICD-10-CM

## 2018-08-21 DIAGNOSIS — I48.21 PERMANENT ATRIAL FIBRILLATION (HCC): ICD-10-CM

## 2018-08-21 DIAGNOSIS — R73.9 HYPERGLYCEMIA: ICD-10-CM

## 2018-08-21 PROBLEM — E11.9 DIET-CONTROLLED DIABETES MELLITUS (HCC): Status: RESOLVED | Noted: 2018-08-21 | Resolved: 2018-08-21

## 2018-08-21 PROBLEM — S76.219A GROIN STRAIN: Status: RESOLVED | Noted: 2018-01-26 | Resolved: 2018-08-21

## 2018-08-21 PROBLEM — E11.9 DIET-CONTROLLED DIABETES MELLITUS (HCC): Status: ACTIVE | Noted: 2018-08-21

## 2018-08-21 LAB
ANION GAP SERPL CALCULATED.3IONS-SCNC: 15 MMOL/L (ref 3–16)
BUN BLDV-MCNC: 35 MG/DL (ref 7–20)
CALCIUM SERPL-MCNC: 10 MG/DL (ref 8.3–10.6)
CHLORIDE BLD-SCNC: 99 MMOL/L (ref 99–110)
CO2: 25 MMOL/L (ref 21–32)
CREAT SERPL-MCNC: 1.7 MG/DL (ref 0.8–1.3)
GFR AFRICAN AMERICAN: 46
GFR NON-AFRICAN AMERICAN: 38
GLUCOSE BLD-MCNC: 122 MG/DL (ref 70–99)
HBA1C MFR BLD: 5.8 %
POTASSIUM SERPL-SCNC: 4.9 MMOL/L (ref 3.5–5.1)
SODIUM BLD-SCNC: 139 MMOL/L (ref 136–145)

## 2018-08-21 PROCEDURE — 1036F TOBACCO NON-USER: CPT | Performed by: FAMILY MEDICINE

## 2018-08-21 PROCEDURE — G8420 CALC BMI NORM PARAMETERS: HCPCS | Performed by: FAMILY MEDICINE

## 2018-08-21 PROCEDURE — G8926 SPIRO NO PERF OR DOC: HCPCS | Performed by: FAMILY MEDICINE

## 2018-08-21 PROCEDURE — 83036 HEMOGLOBIN GLYCOSYLATED A1C: CPT | Performed by: FAMILY MEDICINE

## 2018-08-21 PROCEDURE — G8599 NO ASA/ANTIPLAT THER USE RNG: HCPCS | Performed by: FAMILY MEDICINE

## 2018-08-21 PROCEDURE — G8427 DOCREV CUR MEDS BY ELIG CLIN: HCPCS | Performed by: FAMILY MEDICINE

## 2018-08-21 PROCEDURE — 1101F PT FALLS ASSESS-DOCD LE1/YR: CPT | Performed by: FAMILY MEDICINE

## 2018-08-21 PROCEDURE — 4040F PNEUMOC VAC/ADMIN/RCVD: CPT | Performed by: FAMILY MEDICINE

## 2018-08-21 PROCEDURE — 3023F SPIROM DOC REV: CPT | Performed by: FAMILY MEDICINE

## 2018-08-21 PROCEDURE — 99214 OFFICE O/P EST MOD 30 MIN: CPT | Performed by: FAMILY MEDICINE

## 2018-08-21 PROCEDURE — 1123F ACP DISCUSS/DSCN MKR DOCD: CPT | Performed by: FAMILY MEDICINE

## 2018-08-21 ASSESSMENT — PATIENT HEALTH QUESTIONNAIRE - PHQ9
2. FEELING DOWN, DEPRESSED OR HOPELESS: 0
SUM OF ALL RESPONSES TO PHQ QUESTIONS 1-9: 0
SUM OF ALL RESPONSES TO PHQ9 QUESTIONS 1 & 2: 0
SUM OF ALL RESPONSES TO PHQ QUESTIONS 1-9: 0
1. LITTLE INTEREST OR PLEASURE IN DOING THINGS: 0

## 2018-08-21 NOTE — PROGRESS NOTES
glomerular filtration rate between 30-59 mL/min/1.73 square meter     The ASCVD Risk score (Skylar Duncan., et al., 2013) failed to calculate for the following reasons: The 2013 ASCVD risk score is only valid for ages 36 to 78  Current Outpatient Prescriptions   Medication Sig Dispense Refill    isosorbide mononitrate (IMDUR) 30 MG extended release tablet Take 2 tablets by mouth daily Dr. Nathan Stout - Cardio 60 tablet 2    rOPINIRole (REQUIP) 2 MG tablet Take 1 tablet by mouth nightly 90 tablet 0    Multiple Vitamins-Minerals (CENTRUM SILVER) TABS Take 1 tablet by mouth nightly 30 tablet 2    furosemide (LASIX) 20 MG tablet Take 2-3 tablets by mouth daily Take 40 mg daily. Increase to 60 mg if weight over 164 lbs.  60 tablet 5    lisinopril (PRINIVIL;ZESTRIL) 20 MG tablet Take 1 tablet by mouth daily 30 tablet 1    calcium citrate-vitamin D (CITRACAL MAXIMUM) 315-250 MG-UNIT TABS per tablet Take 2 tablets by mouth 2 times daily (with meals) 120 tablet 1    sertraline (ZOLOFT) 50 MG tablet Take 1 tablet by mouth daily 30 tablet 2    docusate sodium (COLACE) 100 MG capsule Take 1 capsule by mouth 2 times daily 60 capsule 5    gabapentin (NEURONTIN) 300 MG capsule Take 1 capsule by mouth 2 times daily 180 capsule 1    Omega-3 1000 MG CAPS Take 1 capsule by mouth daily      hydrALAZINE (APRESOLINE) 100 MG tablet Take 1 tablet by mouth 3 times daily 90 tablet 1    acetaminophen (TYLENOL) 325 MG tablet Take 650 mg by mouth every 6 hours as needed for Pain      aspirin (ASPIRIN CHILDRENS) 81 MG chewable tablet Take 1 tablet by mouth daily 30 tablet 3    Incontinence Supply Disposable (INCONTINENCE BRIEF MEDIUM) MISC three times per day as needed 100 each 11    Nutritional Supplements (ENSURE PLUS) LIQD Take 1 each by mouth daily 30 Can 11    gemfibrozil (LOPID) 600 MG tablet Take 1 tablet by mouth 2 times daily (before meals) 180 tablet 3    magnesium hydroxide (MILK OF MAGNESIA) 400 MG/5ML suspension Take 15 mLs by mouth daily as needed for Constipation or Heartburn       No current facility-administered medications for this visit. Cholesterol, Total   Date Value Ref Range Status   2017 107 0 - 199 mg/dL Final     LDL Calculated   Date Value Ref Range Status   2017 70 <100 mg/dL Final     HDL   Date Value Ref Range Status   2017 26 (L) 40 - 60 mg/dL Final     Triglycerides   Date Value Ref Range Status   2017 54 0 - 150 mg/dL Final     Lab Results   Component Value Date    GLUCOSE 106 (H) 05/15/2018     Lab Results   Component Value Date     05/15/2018    K 5.0 05/15/2018    CREATININE 1.9 (H) 05/15/2018     Lab Results   Component Value Date    WBC 8.0 2018    HGB 11.2 (L) 2018    HCT 33.7 (L) 2018    MCV 95.2 2018     2018     Lab Results   Component Value Date    ALT 26 2017    AST 47 (H) 2017    ALKPHOS 75 2017    BILITOT 0.3 2017     TSH (uIU/mL)   Date Value   2015 2.12     Lab Results   Component Value Date    LABA1C 6.6 05/15/2018      Subjective:   HPI CHRONIC:   Chief Complaint   Patient presents with    Hypertension     Check up     Patient here for follow-up of multiple chronic conditions includin. Coronary artery disease involving native coronary artery of native heart without angina pectoris    2. Essential hypertension    3. Permanent atrial fibrillation ([facility]    4. Cor pulmonale, chronic ([facility]    5. Hyperglycemia    6. Pulmonary emphysema, unspecified emphysema type ([facility]    7. Chronic renal disease, stage 3, moderately decreased glomerular filtration rate between 30-59 mL/min/1.73 square meter    8. At high risk for falls      Complaints: Leg swelling some days ago, got better after elevating L foot in bed  dyspnea on exertion better today    · Following appropriate diet? Sometimes  · Exercise: walking intermittently  · Taking medicines daily as directed?  Yes  · Any side effects of

## 2018-08-27 RX ORDER — M-VIT,TX,IRON,MINS/CALC/FOLIC 27MG-0.4MG
1 TABLET ORAL DAILY
COMMUNITY
End: 2018-08-27 | Stop reason: SDUPTHER

## 2018-08-28 RX ORDER — M-VIT,TX,IRON,MINS/CALC/FOLIC 27MG-0.4MG
1 TABLET ORAL DAILY
Qty: 30 TABLET | Refills: 11 | Status: SHIPPED | OUTPATIENT
Start: 2018-08-28 | End: 2018-11-28 | Stop reason: SDUPTHER

## 2018-11-21 ENCOUNTER — OFFICE VISIT (OUTPATIENT)
Dept: INTERNAL MEDICINE CLINIC | Age: 83
End: 2018-11-21
Payer: MEDICARE

## 2018-11-21 ENCOUNTER — HOSPITAL ENCOUNTER (INPATIENT)
Age: 83
LOS: 2 days | Discharge: HOME | DRG: 682 | End: 2018-11-23
Attending: EMERGENCY MEDICINE | Admitting: INTERNAL MEDICINE
Payer: MEDICARE

## 2018-11-21 ENCOUNTER — APPOINTMENT (OUTPATIENT)
Dept: GENERAL RADIOLOGY | Age: 83
DRG: 682 | End: 2018-11-21
Payer: MEDICARE

## 2018-11-21 ENCOUNTER — APPOINTMENT (OUTPATIENT)
Dept: ULTRASOUND IMAGING | Age: 83
DRG: 682 | End: 2018-11-21
Payer: MEDICARE

## 2018-11-21 VITALS
HEART RATE: 50 BPM | BODY MASS INDEX: 24.34 KG/M2 | HEIGHT: 70 IN | RESPIRATION RATE: 16 BRPM | WEIGHT: 170 LBS | DIASTOLIC BLOOD PRESSURE: 70 MMHG | SYSTOLIC BLOOD PRESSURE: 170 MMHG

## 2018-11-21 DIAGNOSIS — I48.91 ATRIAL FIBRILLATION WITH SLOW VENTRICULAR RESPONSE (HCC): Primary | ICD-10-CM

## 2018-11-21 DIAGNOSIS — R10.31 RIGHT LOWER QUADRANT ABDOMINAL PAIN: ICD-10-CM

## 2018-11-21 DIAGNOSIS — K80.20 CALCULUS OF GALLBLADDER WITHOUT CHOLECYSTITIS WITHOUT OBSTRUCTION: ICD-10-CM

## 2018-11-21 DIAGNOSIS — R07.89 ATYPICAL CHEST PAIN: ICD-10-CM

## 2018-11-21 DIAGNOSIS — R10.31 RIGHT LOWER QUADRANT ABDOMINAL PAIN: Primary | ICD-10-CM

## 2018-11-21 DIAGNOSIS — R06.02 SHORTNESS OF BREATH: ICD-10-CM

## 2018-11-21 DIAGNOSIS — R77.8 ELEVATED TROPONIN: ICD-10-CM

## 2018-11-21 PROBLEM — N17.9 ACUTE RENAL FAILURE SUPERIMPOSED ON STAGE 3 CHRONIC KIDNEY DISEASE (HCC): Status: ACTIVE | Noted: 2018-11-21

## 2018-11-21 PROBLEM — R79.89 ELEVATED TROPONIN: Status: ACTIVE | Noted: 2018-11-21

## 2018-11-21 PROBLEM — R10.11 RIGHT UPPER QUADRANT ABDOMINAL PAIN: Status: ACTIVE | Noted: 2018-11-21

## 2018-11-21 PROBLEM — I25.10 CAD (CORONARY ARTERY DISEASE): Status: ACTIVE | Noted: 2018-11-21

## 2018-11-21 PROBLEM — N18.30 ACUTE RENAL FAILURE SUPERIMPOSED ON STAGE 3 CHRONIC KIDNEY DISEASE (HCC): Status: ACTIVE | Noted: 2018-11-21

## 2018-11-21 PROBLEM — I50.33 ACUTE ON CHRONIC DIASTOLIC (CONGESTIVE) HEART FAILURE (HCC): Status: ACTIVE | Noted: 2018-11-21

## 2018-11-21 LAB
A/G RATIO: 1.3 (ref 1.1–2.2)
A/G RATIO: 1.6 (ref 1.1–2.2)
ALBUMIN SERPL-MCNC: 3.9 G/DL (ref 3.4–5)
ALBUMIN SERPL-MCNC: 4.1 G/DL (ref 3.4–5)
ALP BLD-CCNC: 71 U/L (ref 40–129)
ALP BLD-CCNC: 77 U/L (ref 40–129)
ALT SERPL-CCNC: 10 U/L (ref 10–40)
ALT SERPL-CCNC: 11 U/L (ref 10–40)
ANION GAP SERPL CALCULATED.3IONS-SCNC: 14 MMOL/L (ref 3–16)
ANION GAP SERPL CALCULATED.3IONS-SCNC: 15 MMOL/L (ref 3–16)
AST SERPL-CCNC: 18 U/L (ref 15–37)
AST SERPL-CCNC: 20 U/L (ref 15–37)
BASOPHILS ABSOLUTE: 0 K/UL (ref 0–0.2)
BASOPHILS RELATIVE PERCENT: 0.2 %
BILIRUB SERPL-MCNC: 0.5 MG/DL (ref 0–1)
BILIRUB SERPL-MCNC: 0.7 MG/DL (ref 0–1)
BILIRUBIN, POC: ABNORMAL
BLOOD URINE, POC: ABNORMAL
BUN BLDV-MCNC: 54 MG/DL (ref 7–20)
BUN BLDV-MCNC: 55 MG/DL (ref 7–20)
CALCIUM SERPL-MCNC: 9.5 MG/DL (ref 8.3–10.6)
CALCIUM SERPL-MCNC: 9.8 MG/DL (ref 8.3–10.6)
CHLORIDE BLD-SCNC: 98 MMOL/L (ref 99–110)
CHLORIDE BLD-SCNC: 99 MMOL/L (ref 99–110)
CLARITY, POC: ABNORMAL
CO2: 24 MMOL/L (ref 21–32)
CO2: 26 MMOL/L (ref 21–32)
COLOR, POC: ABNORMAL
CREAT SERPL-MCNC: 2.1 MG/DL (ref 0.8–1.3)
CREAT SERPL-MCNC: 2.2 MG/DL (ref 0.8–1.3)
EKG ATRIAL RATE: 31 BPM
EKG DIAGNOSIS: NORMAL
EKG Q-T INTERVAL: 496 MS
EKG QRS DURATION: 146 MS
EKG QTC CALCULATION (BAZETT): 434 MS
EKG R AXIS: -70 DEGREES
EKG T AXIS: 241 DEGREES
EKG VENTRICULAR RATE: 46 BPM
EOSINOPHILS ABSOLUTE: 0 K/UL (ref 0–0.6)
EOSINOPHILS RELATIVE PERCENT: 0.4 %
GFR AFRICAN AMERICAN: 34
GFR AFRICAN AMERICAN: 36
GFR NON-AFRICAN AMERICAN: 28
GFR NON-AFRICAN AMERICAN: 30
GLOBULIN: 2.6 G/DL
GLOBULIN: 2.9 G/DL
GLUCOSE BLD-MCNC: 102 MG/DL (ref 70–99)
GLUCOSE BLD-MCNC: 132 MG/DL (ref 70–99)
GLUCOSE URINE, POC: ABNORMAL
HCT VFR BLD CALC: 29.5 % (ref 40.5–52.5)
HCT VFR BLD CALC: 32.3 % (ref 40.5–52.5)
HEMOGLOBIN: 10.6 G/DL (ref 13.5–17.5)
HEMOGLOBIN: 9.8 G/DL (ref 13.5–17.5)
KETONES, POC: ABNORMAL
LACTIC ACID, SEPSIS: 1 MMOL/L (ref 0.4–1.9)
LACTIC ACID, SEPSIS: 1.7 MMOL/L (ref 0.4–1.9)
LACTIC ACID: 1.2 MMOL/L (ref 0.4–2)
LEUKOCYTE EST, POC: ABNORMAL
LIPASE: 19 U/L (ref 13–60)
LYMPHOCYTES ABSOLUTE: 1 K/UL (ref 1–5.1)
LYMPHOCYTES RELATIVE PERCENT: 8 %
MCH RBC QN AUTO: 30.9 PG (ref 26–34)
MCH RBC QN AUTO: 31.6 PG (ref 26–34)
MCHC RBC AUTO-ENTMCNC: 32.9 G/DL (ref 31–36)
MCHC RBC AUTO-ENTMCNC: 33.4 G/DL (ref 31–36)
MCV RBC AUTO: 94 FL (ref 80–100)
MCV RBC AUTO: 94.9 FL (ref 80–100)
MONOCYTES ABSOLUTE: 0.6 K/UL (ref 0–1.3)
MONOCYTES RELATIVE PERCENT: 5 %
NEUTROPHILS ABSOLUTE: 10.6 K/UL (ref 1.7–7.7)
NEUTROPHILS RELATIVE PERCENT: 86.4 %
NITRITE, POC: ABNORMAL
PDW BLD-RTO: 14.1 % (ref 12.4–15.4)
PDW BLD-RTO: 14.2 % (ref 12.4–15.4)
PH, POC: 6
PLATELET # BLD: 161 K/UL (ref 135–450)
PLATELET # BLD: 174 K/UL (ref 135–450)
PMV BLD AUTO: 7.2 FL (ref 5–10.5)
PMV BLD AUTO: 7.2 FL (ref 5–10.5)
POTASSIUM SERPL-SCNC: 4.5 MMOL/L (ref 3.5–5.1)
POTASSIUM SERPL-SCNC: 4.8 MMOL/L (ref 3.5–5.1)
PRO-BNP: 7877 PG/ML (ref 0–449)
PRO-BNP: 7943 PG/ML (ref 0–449)
PROTEIN, POC: 30
RBC # BLD: 3.1 M/UL (ref 4.2–5.9)
RBC # BLD: 3.44 M/UL (ref 4.2–5.9)
SODIUM BLD-SCNC: 137 MMOL/L (ref 136–145)
SODIUM BLD-SCNC: 139 MMOL/L (ref 136–145)
SPECIFIC GRAVITY, POC: 1.01
TOTAL PROTEIN: 6.7 G/DL (ref 6.4–8.2)
TOTAL PROTEIN: 6.8 G/DL (ref 6.4–8.2)
TROPONIN: 0.29 NG/ML
TROPONIN: 0.32 NG/ML
TROPONIN: 0.33 NG/ML
UROBILINOGEN, POC: 0.2
WBC # BLD: 12.3 K/UL (ref 4–11)
WBC # BLD: 13.6 K/UL (ref 4–11)

## 2018-11-21 PROCEDURE — 71045 X-RAY EXAM CHEST 1 VIEW: CPT

## 2018-11-21 PROCEDURE — 93010 ELECTROCARDIOGRAM REPORT: CPT | Performed by: INTERNAL MEDICINE

## 2018-11-21 PROCEDURE — G8599 NO ASA/ANTIPLAT THER USE RNG: HCPCS | Performed by: NURSE PRACTITIONER

## 2018-11-21 PROCEDURE — 83605 ASSAY OF LACTIC ACID: CPT

## 2018-11-21 PROCEDURE — G8420 CALC BMI NORM PARAMETERS: HCPCS | Performed by: NURSE PRACTITIONER

## 2018-11-21 PROCEDURE — 2580000003 HC RX 258: Performed by: INTERNAL MEDICINE

## 2018-11-21 PROCEDURE — 85025 COMPLETE CBC W/AUTO DIFF WBC: CPT

## 2018-11-21 PROCEDURE — 81002 URINALYSIS NONAUTO W/O SCOPE: CPT | Performed by: NURSE PRACTITIONER

## 2018-11-21 PROCEDURE — 6370000000 HC RX 637 (ALT 250 FOR IP): Performed by: INTERNAL MEDICINE

## 2018-11-21 PROCEDURE — 93000 ELECTROCARDIOGRAM COMPLETE: CPT | Performed by: NURSE PRACTITIONER

## 2018-11-21 PROCEDURE — 1123F ACP DISCUSS/DSCN MKR DOCD: CPT | Performed by: NURSE PRACTITIONER

## 2018-11-21 PROCEDURE — 4040F PNEUMOC VAC/ADMIN/RCVD: CPT | Performed by: NURSE PRACTITIONER

## 2018-11-21 PROCEDURE — 93005 ELECTROCARDIOGRAM TRACING: CPT | Performed by: EMERGENCY MEDICINE

## 2018-11-21 PROCEDURE — 99214 OFFICE O/P EST MOD 30 MIN: CPT | Performed by: NURSE PRACTITIONER

## 2018-11-21 PROCEDURE — 36415 COLL VENOUS BLD VENIPUNCTURE: CPT

## 2018-11-21 PROCEDURE — 83690 ASSAY OF LIPASE: CPT

## 2018-11-21 PROCEDURE — 1036F TOBACCO NON-USER: CPT | Performed by: NURSE PRACTITIONER

## 2018-11-21 PROCEDURE — 87040 BLOOD CULTURE FOR BACTERIA: CPT

## 2018-11-21 PROCEDURE — 6360000002 HC RX W HCPCS: Performed by: INTERNAL MEDICINE

## 2018-11-21 PROCEDURE — 2060000000 HC ICU INTERMEDIATE R&B

## 2018-11-21 PROCEDURE — 1101F PT FALLS ASSESS-DOCD LE1/YR: CPT | Performed by: NURSE PRACTITIONER

## 2018-11-21 PROCEDURE — G8427 DOCREV CUR MEDS BY ELIG CLIN: HCPCS | Performed by: NURSE PRACTITIONER

## 2018-11-21 PROCEDURE — 99285 EMERGENCY DEPT VISIT HI MDM: CPT

## 2018-11-21 PROCEDURE — 84484 ASSAY OF TROPONIN QUANT: CPT

## 2018-11-21 PROCEDURE — 83880 ASSAY OF NATRIURETIC PEPTIDE: CPT

## 2018-11-21 PROCEDURE — 76705 ECHO EXAM OF ABDOMEN: CPT

## 2018-11-21 PROCEDURE — 80053 COMPREHEN METABOLIC PANEL: CPT

## 2018-11-21 PROCEDURE — G8484 FLU IMMUNIZE NO ADMIN: HCPCS | Performed by: NURSE PRACTITIONER

## 2018-11-21 RX ORDER — BISACODYL 10 MG
10 SUPPOSITORY, RECTAL RECTAL DAILY PRN
Status: DISCONTINUED | OUTPATIENT
Start: 2018-11-21 | End: 2018-11-23 | Stop reason: HOSPADM

## 2018-11-21 RX ORDER — ROPINIROLE 1 MG/1
2 TABLET, FILM COATED ORAL NIGHTLY
Status: DISCONTINUED | OUTPATIENT
Start: 2018-11-21 | End: 2018-11-23 | Stop reason: HOSPADM

## 2018-11-21 RX ORDER — FUROSEMIDE 10 MG/ML
40 INJECTION INTRAMUSCULAR; INTRAVENOUS 2 TIMES DAILY
Status: DISCONTINUED | OUTPATIENT
Start: 2018-11-21 | End: 2018-11-23 | Stop reason: HOSPADM

## 2018-11-21 RX ORDER — GEMFIBROZIL 600 MG/1
600 TABLET, FILM COATED ORAL
Status: DISCONTINUED | OUTPATIENT
Start: 2018-11-21 | End: 2018-11-23 | Stop reason: HOSPADM

## 2018-11-21 RX ORDER — ASPIRIN 81 MG/1
81 TABLET, CHEWABLE ORAL DAILY
Status: DISCONTINUED | OUTPATIENT
Start: 2018-11-22 | End: 2018-11-23 | Stop reason: HOSPADM

## 2018-11-21 RX ORDER — ACETAMINOPHEN 325 MG/1
650 TABLET ORAL EVERY 4 HOURS PRN
Status: DISCONTINUED | OUTPATIENT
Start: 2018-11-21 | End: 2018-11-23 | Stop reason: HOSPADM

## 2018-11-21 RX ORDER — IPRATROPIUM BROMIDE AND ALBUTEROL SULFATE 2.5; .5 MG/3ML; MG/3ML
1 SOLUTION RESPIRATORY (INHALATION) EVERY 4 HOURS PRN
Status: DISCONTINUED | OUTPATIENT
Start: 2018-11-21 | End: 2018-11-23 | Stop reason: HOSPADM

## 2018-11-21 RX ORDER — LISINOPRIL 20 MG/1
20 TABLET ORAL DAILY
Status: DISCONTINUED | OUTPATIENT
Start: 2018-11-22 | End: 2018-11-23 | Stop reason: HOSPADM

## 2018-11-21 RX ORDER — FAMOTIDINE 20 MG/1
20 TABLET, FILM COATED ORAL DAILY
Status: DISCONTINUED | OUTPATIENT
Start: 2018-11-21 | End: 2018-11-23 | Stop reason: HOSPADM

## 2018-11-21 RX ORDER — DOCUSATE SODIUM 100 MG/1
100 CAPSULE, LIQUID FILLED ORAL 2 TIMES DAILY PRN
Status: DISCONTINUED | OUTPATIENT
Start: 2018-11-21 | End: 2018-11-23 | Stop reason: HOSPADM

## 2018-11-21 RX ORDER — FAMOTIDINE 20 MG/1
20 TABLET, FILM COATED ORAL 2 TIMES DAILY
Status: DISCONTINUED | OUTPATIENT
Start: 2018-11-21 | End: 2018-11-21

## 2018-11-21 RX ORDER — SODIUM CHLORIDE 0.9 % (FLUSH) 0.9 %
10 SYRINGE (ML) INJECTION EVERY 12 HOURS SCHEDULED
Status: DISCONTINUED | OUTPATIENT
Start: 2018-11-21 | End: 2018-11-23 | Stop reason: HOSPADM

## 2018-11-21 RX ORDER — ISOSORBIDE MONONITRATE 60 MG/1
60 TABLET, EXTENDED RELEASE ORAL DAILY
Status: DISCONTINUED | OUTPATIENT
Start: 2018-11-22 | End: 2018-11-22

## 2018-11-21 RX ORDER — SODIUM CHLORIDE 0.9 % (FLUSH) 0.9 %
10 SYRINGE (ML) INJECTION PRN
Status: DISCONTINUED | OUTPATIENT
Start: 2018-11-21 | End: 2018-11-23 | Stop reason: HOSPADM

## 2018-11-21 RX ORDER — GABAPENTIN 300 MG/1
300 CAPSULE ORAL 2 TIMES DAILY
Status: DISCONTINUED | OUTPATIENT
Start: 2018-11-21 | End: 2018-11-23 | Stop reason: HOSPADM

## 2018-11-21 RX ORDER — ONDANSETRON 2 MG/ML
4 INJECTION INTRAMUSCULAR; INTRAVENOUS EVERY 4 HOURS PRN
Status: DISCONTINUED | OUTPATIENT
Start: 2018-11-21 | End: 2018-11-23 | Stop reason: HOSPADM

## 2018-11-21 RX ORDER — HYDRALAZINE HYDROCHLORIDE 50 MG/1
100 TABLET, FILM COATED ORAL 3 TIMES DAILY
Status: DISCONTINUED | OUTPATIENT
Start: 2018-11-21 | End: 2018-11-23 | Stop reason: HOSPADM

## 2018-11-21 RX ADMIN — FAMOTIDINE 20 MG: 20 TABLET ORAL at 17:41

## 2018-11-21 RX ADMIN — HYDRALAZINE HYDROCHLORIDE 100 MG: 50 TABLET, FILM COATED ORAL at 19:50

## 2018-11-21 RX ADMIN — GEMFIBROZIL 600 MG: 600 TABLET ORAL at 18:14

## 2018-11-21 RX ADMIN — Medication 10 ML: at 19:50

## 2018-11-21 RX ADMIN — ENOXAPARIN SODIUM 30 MG: 30 INJECTION SUBCUTANEOUS at 17:41

## 2018-11-21 RX ADMIN — GABAPENTIN 300 MG: 300 CAPSULE ORAL at 19:50

## 2018-11-21 RX ADMIN — FUROSEMIDE 40 MG: 10 INJECTION, SOLUTION INTRAMUSCULAR; INTRAVENOUS at 17:41

## 2018-11-21 RX ADMIN — ACETAMINOPHEN 650 MG: 325 TABLET, FILM COATED ORAL at 19:50

## 2018-11-21 RX ADMIN — ROPINIROLE HYDROCHLORIDE 2 MG: 1 TABLET, FILM COATED ORAL at 19:50

## 2018-11-21 ASSESSMENT — ENCOUNTER SYMPTOMS
NAUSEA: 1
WHEEZING: 0
ABDOMINAL DISTENTION: 0
HEMATOCHEZIA: 0
COLOR CHANGE: 0
DIARRHEA: 1
COUGH: 0
SINUS PAIN: 0
FLATUS: 0
BELCHING: 0
BACK PAIN: 0
VOICE CHANGE: 0
STRIDOR: 0
SHORTNESS OF BREATH: 1
VOMITING: 0
ABDOMINAL PAIN: 1
CONSTIPATION: 0
SINUS PRESSURE: 0
SORE THROAT: 0

## 2018-11-21 ASSESSMENT — PAIN SCALES - GENERAL
PAINLEVEL_OUTOF10: 5
PAINLEVEL_OUTOF10: 2

## 2018-11-21 ASSESSMENT — PAIN DESCRIPTION - LOCATION: LOCATION: ABDOMEN

## 2018-11-21 NOTE — ED PROVIDER NOTES
Shantelle Hein MD with the below findings:      Interpretation per the Radiologist below, if available at the time of this note:    [address]   Final Result   Cholelithiasis. XR CHEST PORTABLE   Final Result   Bibasilar interstitial opacities, likely fibrosis. No acute cardiopulmonary disease is suspected.                ED BEDSIDE ULTRASOUND:   Performed by ED Physician - none    LABS:  Labs Reviewed   CBC WITH AUTO DIFFERENTIAL - Abnormal; Notable for the following:        Result Value    WBC 12.3 (*)     RBC 3.10 (*)     Hemoglobin 9.8 (*)     Hematocrit 29.5 (*)     Neutrophils # 10.6 (*)     All other components within normal limits    Narrative:     Performed at:  [facility]  [address], [employer]   Phone [phone]   COMPREHENSIVE METABOLIC PANEL - Abnormal; Notable for the following:     Chloride 98 (*)     Glucose 132 (*)     BUN 54 (*)     CREATININE 2.1 (*)     GFR Non- 30 (*)     GFR  36 (*)     All other components within normal limits    Narrative:     Performed at:  [facility]  [address], [place] [address]   Phone [phone]   TROPONIN - Abnormal; Notable for the following:     Troponin 0.29 (*)     All other components within normal limits    Narrative:     Performed at:  [facility]  [address], [employer]   Phone [phone]   BRAIN NATRIURETIC PEPTIDE - Abnormal; Notable for the following:     Pro-BNP 2,510 (*)     All other components within normal limits    Narrative:     Performed at:  [facility]  [address] [facility] [employer] [address]   Phone [phone]   CULTURE BLOOD #1   CULTURE BLOOD #2   LACTATE, SEPSIS    Narrative:     Performed at:  [facility]  [address] [facility] [employer]   Phone [phone] MD  11/21/18 1856

## 2018-11-21 NOTE — PROGRESS NOTES
Medication Sig Dispense Refill    Multiple Vitamins-Minerals (THERAPEUTIC MULTIVITAMIN-MINERALS) tablet Take 1 tablet by mouth daily 30 tablet 11    isosorbide mononitrate (IMDUR) 30 MG extended release tablet Take 2 tablets by mouth daily Dr. Mulu Ruiz - Cardio 60 tablet 2    rOPINIRole (REQUIP) 2 MG tablet Take 1 tablet by mouth nightly 90 tablet 0    Multiple Vitamins-Minerals (CENTRUM SILVER) TABS Take 1 tablet by mouth nightly 30 tablet 2    furosemide (LASIX) 20 MG tablet Take 2-3 tablets by mouth daily Take 40 mg daily. Increase to 60 mg if weight over 164 lbs.  60 tablet 5    lisinopril (PRINIVIL;ZESTRIL) 20 MG tablet Take 1 tablet by mouth daily 30 tablet 1    calcium citrate-vitamin D (CITRACAL MAXIMUM) 315-250 MG-UNIT TABS per tablet Take 2 tablets by mouth 2 times daily (with meals) 120 tablet 1    sertraline (ZOLOFT) 50 MG tablet Take 1 tablet by mouth daily 30 tablet 2    docusate sodium (COLACE) 100 MG capsule Take 1 capsule by mouth 2 times daily 60 capsule 5    gabapentin (NEURONTIN) 300 MG capsule Take 1 capsule by mouth 2 times daily 180 capsule 1    Omega-3 1000 MG CAPS Take 1 capsule by mouth daily      hydrALAZINE (APRESOLINE) 100 MG tablet Take 1 tablet by mouth 3 times daily 90 tablet 1    acetaminophen (TYLENOL) 325 MG tablet Take 650 mg by mouth every 6 hours as needed for Pain      aspirin (ASPIRIN CHILDRENS) 81 MG chewable tablet Take 1 tablet by mouth daily 30 tablet 3    Incontinence Supply Disposable (INCONTINENCE BRIEF MEDIUM) MISC three times per day as needed 100 each 11    Nutritional Supplements (ENSURE PLUS) LIQD Take 1 each by mouth daily 30 Can 11    gemfibrozil (LOPID) 600 MG tablet Take 1 tablet by mouth 2 times daily (before meals) 180 tablet 3    magnesium hydroxide (MILK OF MAGNESIA) 400 MG/5ML suspension Take 15 mLs by mouth daily as needed for Constipation or Heartburn         Family History   Problem Relation Age of Onset    Dementia Brother    [facility]

## 2018-11-21 NOTE — ED NOTES
FYI only Pt family states that the patient has been missing the right hearing aid prior to arriving at the hospital.     Truman Bonilla RN  11/21/18 8315

## 2018-11-22 LAB
ANION GAP SERPL CALCULATED.3IONS-SCNC: 16 MMOL/L (ref 3–16)
BASOPHILS ABSOLUTE: 0 K/UL (ref 0–0.2)
BASOPHILS RELATIVE PERCENT: 0.5 %
BUN BLDV-MCNC: 54 MG/DL (ref 7–20)
CALCIUM SERPL-MCNC: 8.9 MG/DL (ref 8.3–10.6)
CHLORIDE BLD-SCNC: 99 MMOL/L (ref 99–110)
CO2: 22 MMOL/L (ref 21–32)
CREAT SERPL-MCNC: 2 MG/DL (ref 0.8–1.3)
EOSINOPHILS ABSOLUTE: 0.2 K/UL (ref 0–0.6)
EOSINOPHILS RELATIVE PERCENT: 2.1 %
GFR AFRICAN AMERICAN: 38
GFR NON-AFRICAN AMERICAN: 31
GLUCOSE BLD-MCNC: 86 MG/DL (ref 70–99)
HCT VFR BLD CALC: 28.5 % (ref 40.5–52.5)
HEMOGLOBIN: 9.7 G/DL (ref 13.5–17.5)
LYMPHOCYTES ABSOLUTE: 1.4 K/UL (ref 1–5.1)
LYMPHOCYTES RELATIVE PERCENT: 15.7 %
MCH RBC QN AUTO: 31.9 PG (ref 26–34)
MCHC RBC AUTO-ENTMCNC: 33.9 G/DL (ref 31–36)
MCV RBC AUTO: 93.9 FL (ref 80–100)
MONOCYTES ABSOLUTE: 0.6 K/UL (ref 0–1.3)
MONOCYTES RELATIVE PERCENT: 7.4 %
NEUTROPHILS ABSOLUTE: 6.4 K/UL (ref 1.7–7.7)
NEUTROPHILS RELATIVE PERCENT: 74.3 %
PDW BLD-RTO: 14.2 % (ref 12.4–15.4)
PLATELET # BLD: 156 K/UL (ref 135–450)
PMV BLD AUTO: 7.4 FL (ref 5–10.5)
POTASSIUM REFLEX MAGNESIUM: 4 MMOL/L (ref 3.5–5.1)
RBC # BLD: 3.03 M/UL (ref 4.2–5.9)
SODIUM BLD-SCNC: 137 MMOL/L (ref 136–145)
TROPONIN: 0.4 NG/ML
TROPONIN: 0.43 NG/ML
WBC # BLD: 8.6 K/UL (ref 4–11)

## 2018-11-22 PROCEDURE — 36415 COLL VENOUS BLD VENIPUNCTURE: CPT

## 2018-11-22 PROCEDURE — 2060000000 HC ICU INTERMEDIATE R&B

## 2018-11-22 PROCEDURE — 90686 IIV4 VACC NO PRSV 0.5 ML IM: CPT | Performed by: INTERNAL MEDICINE

## 2018-11-22 PROCEDURE — 2580000003 HC RX 258: Performed by: INTERNAL MEDICINE

## 2018-11-22 PROCEDURE — 99223 1ST HOSP IP/OBS HIGH 75: CPT | Performed by: INTERNAL MEDICINE

## 2018-11-22 PROCEDURE — 85025 COMPLETE CBC W/AUTO DIFF WBC: CPT

## 2018-11-22 PROCEDURE — 84484 ASSAY OF TROPONIN QUANT: CPT

## 2018-11-22 PROCEDURE — 6370000000 HC RX 637 (ALT 250 FOR IP): Performed by: INTERNAL MEDICINE

## 2018-11-22 PROCEDURE — 6360000002 HC RX W HCPCS: Performed by: INTERNAL MEDICINE

## 2018-11-22 PROCEDURE — G0008 ADMIN INFLUENZA VIRUS VAC: HCPCS | Performed by: INTERNAL MEDICINE

## 2018-11-22 PROCEDURE — 94760 N-INVAS EAR/PLS OXIMETRY 1: CPT

## 2018-11-22 PROCEDURE — 80048 BASIC METABOLIC PNL TOTAL CA: CPT

## 2018-11-22 RX ADMIN — SERTRALINE HYDROCHLORIDE 50 MG: 50 TABLET ORAL at 08:37

## 2018-11-22 RX ADMIN — ACETAMINOPHEN 650 MG: 325 TABLET, FILM COATED ORAL at 06:41

## 2018-11-22 RX ADMIN — HYDRALAZINE HYDROCHLORIDE 100 MG: 50 TABLET, FILM COATED ORAL at 15:04

## 2018-11-22 RX ADMIN — ROPINIROLE HYDROCHLORIDE 2 MG: 1 TABLET, FILM COATED ORAL at 20:22

## 2018-11-22 RX ADMIN — ASPIRIN 81 MG 81 MG: 81 TABLET ORAL at 08:37

## 2018-11-22 RX ADMIN — FAMOTIDINE 20 MG: 20 TABLET ORAL at 08:37

## 2018-11-22 RX ADMIN — FUROSEMIDE 40 MG: 10 INJECTION, SOLUTION INTRAMUSCULAR; INTRAVENOUS at 17:02

## 2018-11-22 RX ADMIN — Medication 10 ML: at 08:40

## 2018-11-22 RX ADMIN — LISINOPRIL 20 MG: 20 TABLET ORAL at 08:37

## 2018-11-22 RX ADMIN — HYDRALAZINE HYDROCHLORIDE 100 MG: 50 TABLET, FILM COATED ORAL at 08:37

## 2018-11-22 RX ADMIN — ISOSORBIDE MONONITRATE 60 MG: 60 TABLET, EXTENDED RELEASE ORAL at 08:37

## 2018-11-22 RX ADMIN — Medication 10 ML: at 20:22

## 2018-11-22 RX ADMIN — ACETAMINOPHEN 650 MG: 325 TABLET, FILM COATED ORAL at 20:22

## 2018-11-22 RX ADMIN — GEMFIBROZIL 600 MG: 600 TABLET ORAL at 17:01

## 2018-11-22 RX ADMIN — GEMFIBROZIL 600 MG: 600 TABLET ORAL at 08:37

## 2018-11-22 RX ADMIN — GABAPENTIN 300 MG: 300 CAPSULE ORAL at 20:22

## 2018-11-22 RX ADMIN — HYDRALAZINE HYDROCHLORIDE 100 MG: 50 TABLET, FILM COATED ORAL at 20:21

## 2018-11-22 RX ADMIN — FUROSEMIDE 40 MG: 10 INJECTION, SOLUTION INTRAMUSCULAR; INTRAVENOUS at 08:37

## 2018-11-22 RX ADMIN — GABAPENTIN 300 MG: 300 CAPSULE ORAL at 08:37

## 2018-11-22 RX ADMIN — INFLUENZA A VIRUS A/MICHIGAN/45/2015 X-275 (H1N1) ANTIGEN (FORMALDEHYDE INACTIVATED), INFLUENZA A VIRUS A/SINGAPORE/INFIMH-16-0019/2016 IVR-186 (H3N2) ANTIGEN (FORMALDEHYDE INACTIVATED), INFLUENZA B VIRUS B/PHUKET/3073/2013 ANTIGEN (FORMALDEHYDE INACTIVATED), AND INFLUENZA B VIRUS B/MARYLAND/15/2016 BX-69A ANTIGEN (FORMALDEHYDE INACTIVATED) 0.5 ML: 15; 15; 15; 15 INJECTION, SUSPENSION INTRAMUSCULAR at 08:37

## 2018-11-22 RX ADMIN — ENOXAPARIN SODIUM 30 MG: 30 INJECTION SUBCUTANEOUS at 08:37

## 2018-11-22 ASSESSMENT — PAIN DESCRIPTION - ORIENTATION: ORIENTATION: LOWER

## 2018-11-22 ASSESSMENT — PAIN DESCRIPTION - LOCATION: LOCATION: ABDOMEN

## 2018-11-22 ASSESSMENT — PAIN DESCRIPTION - PAIN TYPE: TYPE: ACUTE PAIN

## 2018-11-22 ASSESSMENT — PAIN SCALES - GENERAL
PAINLEVEL_OUTOF10: 6
PAINLEVEL_OUTOF10: 5
PAINLEVEL_OUTOF10: 6
PAINLEVEL_OUTOF10: 7

## 2018-11-22 NOTE — H&P
MG tablet Take 1 tablet by mouth daily 4/23/18  Yes Shae Yaets MD   docusate sodium (COLACE) 100 MG capsule Take 1 capsule by mouth 2 times daily 2/14/18  Yes Scout Bauer MD   gabapentin (NEURONTIN) 300 MG capsule Take 1 capsule by mouth 2 times daily 12/28/17  Yes Scout Bauer MD   Omega-3 1000 MG CAPS Take 1 capsule by mouth daily   Yes Historical Provider, MD   hydrALAZINE (APRESOLINE) 100 MG tablet Take 1 tablet by mouth 3 times daily 12/9/17  Yes Mona Marquez MD   acetaminophen (TYLENOL) 325 MG tablet Take 650 mg by mouth every 6 hours as needed for Pain   Yes Historical Provider, MD   aspirin (ASPIRIN CHILDRENS) 81 MG chewable tablet Take 1 tablet by mouth daily 8/24/17  Yes Scout Bauer MD   gemfibrozil (LOPID) 600 MG tablet Take 1 tablet by mouth 2 times daily (before meals) 7/25/17  Yes Scout Bauer MD   Incontinence Supply Disposable (INCONTINENCE BRIEF MEDIUM) MISC three times per day as needed 8/21/17   Scout Bauer MD   Nutritional Supplements (ENSURE PLUS) LIQD Take 1 each by mouth daily 8/21/17   Scout Bauer MD   magnesium hydroxide (MILK OF MAGNESIA) 400 MG/5ML suspension Take 15 mLs by mouth daily as needed for Constipation or Heartburn 7/25/17   Scout Bauer MD       Family History:   Family History   Problem Relation Age of Onset    Dementia Brother     Cancer Son       Social History:   TOBACCO:   reports that he quit smoking about 58 years ago. He has never used smokeless tobacco.  ETOH:   reports that he does not drink alcohol.   OCCUPATION:      REVIEW OF SYSTEMS:  A full review of systems was performed and is negative except for that which appears in the HPI    Physical Exam:    Vitals: BP (!) 184/64   Pulse 53   Temp 98.6 °F (37 °C) (Oral)   Resp 17   Ht 5' 10\" (1.778 m)   Wt 166 lb 7.2 oz (75.5 kg)   SpO2 97%   BMI 23.88 kg/m²   General appearance: WD/WN 80y.o. year-old  male who is alert, appears stated age and is cooperative  HEENT: Head:

## 2018-11-22 NOTE — CONSULTS
Referring Physician: Dr. Boogie Kamara  Reason for Consultation: Sick sinus syndrome. Slow atrial fibrillation. Chief Complaint: abdominal pain    Subjective:   History of Present Illness:  Varsha Aponte is a 80 y.o. patient who presented to the hospital with complaints of R-sided abdominal pain. The patient has dementia and is not oriented to time or place. His daughter is present bedside. He is well known to me from the outpatient setting. He and family do not want invasive procedures and he is essentially comfort care only. As with previous discussions, we again talked about how his slow heart rate could contribute to his LONG but as with other visits he feels he is doing well enough and does not want a pacemaker. Regardless, he feels his abdominal pain is improved and he/family hope he can go home today. He denies chest pains, palpitations, or syncope. Past Medical History:   has a past medical history of A-fib ([address] Abnormal CXR (chest x-ray); Acute respiratory failure ([address] Allergic rhinitis; Anxiety; Arthritis; Atrial fibrillation ([address] CAD (coronary artery disease); Carotid artery disease- LICA < 32% (repeat 2/48); Central hearing loss; CHF (congestive heart failure) ([address] Chronic kidney disease; Colon polyp- last colon 5/22/09; COPD (chronic obstructive pulmonary disease) ([address] Dementia; Dementia; Depression; Dizziness; Eczema; GERD (gastroesophageal reflux disease); Groin strain; Headache; Herpes zoster; HTN (hypertension); Hyperlipidemia; Hypertrophy of prostate without urinary obstruction and other lower urinary tract symptoms (LUTS); Lung nodules; Osteoporosis; Pneumonia; Prostate cancer ([address] Pulmonary HTN ([address] RLS (restless legs syndrome); 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 Surgical History:   has a past surgical history that includes Coronary artery bypass graft (12/2005);  Inner ear surgery (1999); TURP

## 2018-11-23 VITALS
HEART RATE: 43 BPM | TEMPERATURE: 97.4 F | DIASTOLIC BLOOD PRESSURE: 68 MMHG | BODY MASS INDEX: 23.01 KG/M2 | HEIGHT: 70 IN | OXYGEN SATURATION: 95 % | RESPIRATION RATE: 16 BRPM | SYSTOLIC BLOOD PRESSURE: 168 MMHG | WEIGHT: 160.72 LBS

## 2018-11-23 LAB
ANION GAP SERPL CALCULATED.3IONS-SCNC: 16 MMOL/L (ref 3–16)
BASOPHILS ABSOLUTE: 0 K/UL (ref 0–0.2)
BASOPHILS RELATIVE PERCENT: 0.3 %
BUN BLDV-MCNC: 57 MG/DL (ref 7–20)
CALCIUM SERPL-MCNC: 8.9 MG/DL (ref 8.3–10.6)
CHLORIDE BLD-SCNC: 100 MMOL/L (ref 99–110)
CO2: 24 MMOL/L (ref 21–32)
CREAT SERPL-MCNC: 2.2 MG/DL (ref 0.8–1.3)
EOSINOPHILS ABSOLUTE: 0.2 K/UL (ref 0–0.6)
EOSINOPHILS RELATIVE PERCENT: 2.4 %
GFR AFRICAN AMERICAN: 34
GFR NON-AFRICAN AMERICAN: 28
GLUCOSE BLD-MCNC: 87 MG/DL (ref 70–99)
HCT VFR BLD CALC: 30.5 % (ref 40.5–52.5)
HEMOGLOBIN: 10.2 G/DL (ref 13.5–17.5)
LYMPHOCYTES ABSOLUTE: 1.7 K/UL (ref 1–5.1)
LYMPHOCYTES RELATIVE PERCENT: 19.6 %
MCH RBC QN AUTO: 31.5 PG (ref 26–34)
MCHC RBC AUTO-ENTMCNC: 33.5 G/DL (ref 31–36)
MCV RBC AUTO: 94.2 FL (ref 80–100)
MONOCYTES ABSOLUTE: 0.9 K/UL (ref 0–1.3)
MONOCYTES RELATIVE PERCENT: 10.2 %
NEUTROPHILS ABSOLUTE: 5.8 K/UL (ref 1.7–7.7)
NEUTROPHILS RELATIVE PERCENT: 67.5 %
PDW BLD-RTO: 13.9 % (ref 12.4–15.4)
PLATELET # BLD: 195 K/UL (ref 135–450)
PMV BLD AUTO: 6.9 FL (ref 5–10.5)
POTASSIUM REFLEX MAGNESIUM: 4.1 MMOL/L (ref 3.5–5.1)
RBC # BLD: 3.24 M/UL (ref 4.2–5.9)
SODIUM BLD-SCNC: 140 MMOL/L (ref 136–145)
WBC # BLD: 8.6 K/UL (ref 4–11)

## 2018-11-23 PROCEDURE — 2580000003 HC RX 258: Performed by: INTERNAL MEDICINE

## 2018-11-23 PROCEDURE — 80048 BASIC METABOLIC PNL TOTAL CA: CPT

## 2018-11-23 PROCEDURE — 85025 COMPLETE CBC W/AUTO DIFF WBC: CPT

## 2018-11-23 PROCEDURE — 94760 N-INVAS EAR/PLS OXIMETRY 1: CPT

## 2018-11-23 PROCEDURE — 6370000000 HC RX 637 (ALT 250 FOR IP): Performed by: INTERNAL MEDICINE

## 2018-11-23 PROCEDURE — 36415 COLL VENOUS BLD VENIPUNCTURE: CPT

## 2018-11-23 PROCEDURE — 6360000002 HC RX W HCPCS: Performed by: INTERNAL MEDICINE

## 2018-11-23 RX ADMIN — FUROSEMIDE 40 MG: 10 INJECTION, SOLUTION INTRAMUSCULAR; INTRAVENOUS at 08:07

## 2018-11-23 RX ADMIN — ENOXAPARIN SODIUM 30 MG: 30 INJECTION SUBCUTANEOUS at 08:06

## 2018-11-23 RX ADMIN — GEMFIBROZIL 600 MG: 600 TABLET ORAL at 06:05

## 2018-11-23 RX ADMIN — Medication 10 ML: at 08:07

## 2018-11-23 RX ADMIN — ACETAMINOPHEN 650 MG: 325 TABLET, FILM COATED ORAL at 08:07

## 2018-11-23 RX ADMIN — HYDRALAZINE HYDROCHLORIDE 100 MG: 50 TABLET, FILM COATED ORAL at 08:07

## 2018-11-23 RX ADMIN — ASPIRIN 81 MG 81 MG: 81 TABLET ORAL at 08:07

## 2018-11-23 RX ADMIN — SERTRALINE HYDROCHLORIDE 50 MG: 50 TABLET ORAL at 08:07

## 2018-11-23 RX ADMIN — LISINOPRIL 20 MG: 20 TABLET ORAL at 08:07

## 2018-11-23 RX ADMIN — GABAPENTIN 300 MG: 300 CAPSULE ORAL at 08:07

## 2018-11-23 RX ADMIN — FAMOTIDINE 20 MG: 20 TABLET ORAL at 08:07

## 2018-11-23 ASSESSMENT — PAIN DESCRIPTION - PAIN TYPE
TYPE: ACUTE PAIN

## 2018-11-23 ASSESSMENT — PAIN SCALES - GENERAL
PAINLEVEL_OUTOF10: 0
PAINLEVEL_OUTOF10: 0
PAINLEVEL_OUTOF10: 5
PAINLEVEL_OUTOF10: 5
PAINLEVEL_OUTOF10: 4
PAINLEVEL_OUTOF10: 0

## 2018-11-23 ASSESSMENT — PAIN DESCRIPTION - LOCATION
LOCATION: ABDOMEN

## 2018-11-23 NOTE — PROGRESS NOTES
Patient discharged to [address] [place] [address] with all belongings. AVS reviewed with patient and daughter. All questions answered.      Nik Keating
cough, Change from His Baseline. No Leg Edema.             CURRENT STATUS:  Date of Service: Pt seen/examined on 11/21/2018 and Admitted to Inpatient with expected LOS greater than two midnights due to medical therapy     CHIEF COMPLAINT:  Pt c/oRUQ abdominal pain  HISTORY OF PRESENT ILLNESS: Patient is a 59-year-old man with history of hypertension, hyperlipidemia, coronary artery disease, chronic kidney disease, COPD and chronic cor pulmonale. He presents to the emergency room for evaluation following and acute onset have a sharp, stabbing pain in his right lower chest wall, right upper abdomen which began yesterday. He states the pain is intermittent, and severe when present. When severe, he has had nausea but no vomiting. He continues to eat normally. In the emergency room the cause of this pain was not found, but he was found to have bradycardia with heart rates down to 33, an elevated troponin as well as some fluid overload. He has been admitted for further evaluation and treatment. Associated signs and symptoms do not include chest pain, diaphoresis, edema, orthopnea, paroxysmal nocturnal dyspnea, fever or chills.     Assessment/Plan:     Bradycardia - not on BB or CCB; possibly symptomatic with shortness of breath. Will monitor on Telemetry and consult Cardiology     Elevated troponin - Appears to be chronically elevated, but currently higher than his norm. Pt denies chest pain, and there are not ischemic changes on the initial EKG. Likely secondary to reduced clearance associated with renal failure. Will monitor on Telemetry and follow serial cardiac enzymes.      CAD (coronary artery disease) - Pt denies chest pain. Continue Aspirin. Monitor on Telemetry.      Acute renal failure superimposed on stage 3 chronic kidney disease (HCC) - also appears to have some degree of fluid overload; will monitor on IV Lasix, good UOP with lasix IV     Acute on chronic diastolic (congestive) heart failure ([facility]  A 2D

## 2018-11-23 NOTE — DISCHARGE SUMMARY
mouth daily Take 40 mg daily. Increase to 60 mg if weight over 164 lbs. Qty: 60 tablet, Refills: 5    Comments: NEW DIRECTIONS  Associated Diagnoses: Cor pulmonale, chronic (HCC)      lisinopril (PRINIVIL;ZESTRIL) 20 MG tablet Take 1 tablet by mouth daily  Qty: 30 tablet, Refills: 1    Comments: NEW DOSAGE  Associated Diagnoses: Essential hypertension      calcium citrate-vitamin D (CITRACAL MAXIMUM) 315-250 MG-UNIT TABS per tablet Take 2 tablets by mouth 2 times daily (with meals)  Qty: 120 tablet, Refills: 1      sertraline (ZOLOFT) 50 MG tablet Take 1 tablet by mouth daily  Qty: 30 tablet, Refills: 2      docusate sodium (COLACE) 100 MG capsule Take 1 capsule by mouth 2 times daily  Qty: 60 capsule, Refills: 5      gabapentin (NEURONTIN) 300 MG capsule Take 1 capsule by mouth 2 times daily  Qty: 180 capsule, Refills: 1      Omega-3 1000 MG CAPS Take 1 capsule by mouth daily      hydrALAZINE (APRESOLINE) 100 MG tablet Take 1 tablet by mouth 3 times daily  Qty: 90 tablet, Refills: 1    Comments: New dosage      acetaminophen (TYLENOL) 325 MG tablet Take 650 mg by mouth every 6 hours as needed for Pain      aspirin (ASPIRIN CHILDRENS) 81 MG chewable tablet Take 1 tablet by mouth daily  Qty: 30 tablet, Refills: 3      gemfibrozil (LOPID) 600 MG tablet Take 1 tablet by mouth 2 times daily (before meals)  Qty: 180 tablet, Refills: 3    Comments: Replacing tricor      Incontinence Supply Disposable (INCONTINENCE BRIEF MEDIUM) MISC three times per day as needed  Qty: 100 each, Refills: 11    Associated Diagnoses: Other urinary incontinence             Future Appointments  Date Time Provider Raymundo Montoya   12/11/2018 10:45 AM Albert Paz MD [facility]   3/13/2019 8:00 AM Sandi Alfaro MD [facility]       Time Spent on discharge is more than 30 minutes in the examination, evaluation, counseling and review of medications and discharge plan.       Signed:    Chriss Johnson MD   11/23/2018      Thank you

## 2018-11-24 ENCOUNTER — CARE COORDINATION (OUTPATIENT)
Dept: CASE MANAGEMENT | Age: 83
End: 2018-11-24

## 2018-11-24 DIAGNOSIS — I50.33 ACUTE ON CHRONIC DIASTOLIC (CONGESTIVE) HEART FAILURE (HCC): Primary | ICD-10-CM

## 2018-11-24 PROCEDURE — 1111F DSCHRG MED/CURRENT MED MERGE: CPT

## 2018-11-26 LAB
BLOOD CULTURE, ROUTINE: NORMAL
CULTURE, BLOOD 2: NORMAL

## 2018-11-27 ENCOUNTER — CARE COORDINATION (OUTPATIENT)
Dept: CASE MANAGEMENT | Age: 83
End: 2018-11-27

## 2018-11-27 NOTE — CARE COORDINATION
[facility] Transitions Follow Up Call    2018    Patient: Sivakuamr Wong  Patient : 1926   MRN: [id number]  Reason for Admission: There are no discharge diagnoses documented for the most recent discharge. Discharge Date: 18 RARS: Readmission Risk Score: 16       Spoke with: Daughter Christopher Gray Transitions Subsequent and Final Call    Subsequent and Final Calls  Care Transitions Interventions  Other Interventions: Follow Up: CTC from [facility] sent email requesting that this CTC return call to patient's daughter. CTC contacted daughter, she denies any questions or concerns at this time. CTC will continue with outreach follow up calls.     Future Appointments  Date Time Provider Raymundo Montoya   2018 10:45 AM Patrice Denise MD [facility]   3/13/2019 8:00 AM Heather La MD [facility]       Leola Cruz RN

## 2018-11-28 ENCOUNTER — TELEPHONE (OUTPATIENT)
Dept: FAMILY MEDICINE CLINIC | Age: 83
End: 2018-11-28

## 2018-11-29 RX ORDER — M-VIT,TX,IRON,MINS/CALC/FOLIC 27MG-0.4MG
1 TABLET ORAL DAILY
Qty: 30 TABLET | Refills: 11 | Status: SHIPPED | OUTPATIENT
Start: 2018-11-29 | End: 2019-11-23

## 2018-12-03 ENCOUNTER — CARE COORDINATION (OUTPATIENT)
Dept: CASE MANAGEMENT | Age: 83
End: 2018-12-03

## 2018-12-11 ENCOUNTER — OFFICE VISIT (OUTPATIENT)
Dept: CARDIOLOGY CLINIC | Age: 83
End: 2018-12-11
Payer: MEDICARE

## 2018-12-11 VITALS
HEIGHT: 70 IN | WEIGHT: 168 LBS | DIASTOLIC BLOOD PRESSURE: 58 MMHG | BODY MASS INDEX: 24.05 KG/M2 | OXYGEN SATURATION: 99 % | HEART RATE: 41 BPM | SYSTOLIC BLOOD PRESSURE: 174 MMHG

## 2018-12-11 DIAGNOSIS — I27.81 COR PULMONALE, CHRONIC (HCC): ICD-10-CM

## 2018-12-11 DIAGNOSIS — I27.20 PULMONARY HTN (HCC): ICD-10-CM

## 2018-12-11 DIAGNOSIS — I49.5 SSS (SICK SINUS SYNDROME) (HCC): Primary | ICD-10-CM

## 2018-12-11 DIAGNOSIS — I10 ESSENTIAL HYPERTENSION: ICD-10-CM

## 2018-12-11 DIAGNOSIS — N18.30 STAGE 3 CHRONIC KIDNEY DISEASE (HCC): ICD-10-CM

## 2018-12-11 DIAGNOSIS — I25.10 CORONARY ARTERY DISEASE INVOLVING NATIVE CORONARY ARTERY OF NATIVE HEART WITHOUT ANGINA PECTORIS: ICD-10-CM

## 2018-12-11 DIAGNOSIS — I48.20 CHRONIC ATRIAL FIBRILLATION (HCC): ICD-10-CM

## 2018-12-11 DIAGNOSIS — Z95.1 S/P CABG (CORONARY ARTERY BYPASS GRAFT): ICD-10-CM

## 2018-12-11 PROCEDURE — G8420 CALC BMI NORM PARAMETERS: HCPCS | Performed by: INTERNAL MEDICINE

## 2018-12-11 PROCEDURE — G8482 FLU IMMUNIZE ORDER/ADMIN: HCPCS | Performed by: INTERNAL MEDICINE

## 2018-12-11 PROCEDURE — G8427 DOCREV CUR MEDS BY ELIG CLIN: HCPCS | Performed by: INTERNAL MEDICINE

## 2018-12-11 PROCEDURE — 4040F PNEUMOC VAC/ADMIN/RCVD: CPT | Performed by: INTERNAL MEDICINE

## 2018-12-11 PROCEDURE — 1111F DSCHRG MED/CURRENT MED MERGE: CPT | Performed by: INTERNAL MEDICINE

## 2018-12-11 PROCEDURE — 1036F TOBACCO NON-USER: CPT | Performed by: INTERNAL MEDICINE

## 2018-12-11 PROCEDURE — 1101F PT FALLS ASSESS-DOCD LE1/YR: CPT | Performed by: INTERNAL MEDICINE

## 2018-12-11 PROCEDURE — 99214 OFFICE O/P EST MOD 30 MIN: CPT | Performed by: INTERNAL MEDICINE

## 2018-12-11 PROCEDURE — G8598 ASA/ANTIPLAT THER USED: HCPCS | Performed by: INTERNAL MEDICINE

## 2018-12-11 PROCEDURE — 1123F ACP DISCUSS/DSCN MKR DOCD: CPT | Performed by: INTERNAL MEDICINE

## 2018-12-11 NOTE — PROGRESS NOTES
MG tablet Take 1 tablet by mouth daily 30 tablet 1    calcium citrate-vitamin D (CITRACAL MAXIMUM) 315-250 MG-UNIT TABS per tablet Take 2 tablets by mouth 2 times daily (with meals) 120 tablet 1    sertraline (ZOLOFT) 50 MG tablet Take 1 tablet by mouth daily 30 tablet 2    docusate sodium (COLACE) 100 MG capsule Take 1 capsule by mouth 2 times daily 60 capsule 5    gabapentin (NEURONTIN) 300 MG capsule Take 1 capsule by mouth 2 times daily 180 capsule 1    Omega-3 1000 MG CAPS Take 1 capsule by mouth daily      hydrALAZINE (APRESOLINE) 100 MG tablet Take 1 tablet by mouth 3 times daily 90 tablet 1    acetaminophen (TYLENOL) 325 MG tablet Take 650 mg by mouth every 6 hours as needed for Pain      aspirin (ASPIRIN CHILDRENS) 81 MG chewable tablet Take 1 tablet by mouth daily 30 tablet 3    Incontinence Supply Disposable (INCONTINENCE BRIEF MEDIUM) MISC three times per day as needed 100 each 11    gemfibrozil (LOPID) 600 MG tablet Take 1 tablet by mouth 2 times daily (before meals) 180 tablet 3     No current facility-administered medications for this visit. Review of Systems:  · Constitutional: no unanticipated weight loss. There's been no change in energy level, sleep pattern, or activity level. No fevers, chills. · Eyes: No visual changes or diplopia. No scleral icterus. · ENT: No Headaches, hearing loss or vertigo. No mouth sores or sore throat. · Cardiovascular: as reviewed in HPI  · Respiratory: No cough or wheezing, no sputum production. No hematemesis. · Gastrointestinal: No abdominal pain, appetite loss, blood in stools. No change in bowel or bladder habits. · Genitourinary: No dysuria, trouble voiding, or hematuria. · Musculoskeletal:  No gait disturbance, no joint complaints. · Integumentary: No rash or pruritis. · Neurological: No headache, diplopia, change in muscle strength, numbness or tingling. · Psychiatric: No anxiety or depression.   · Endocrine: No temperature is visually estimated to be 55%. No regional wall motion abnormalities are noted. Patient appears to be in atrial fibrillation. The right ventricle is moderately dilated. Right ventricular systolic function appears mildly reduced. The left atrium is mildly dilated. The right atrium is moderately dilated. Mild aortic, mitral, pulmonic regurgitation. There is moderate tricuspid regurgitation. Estimated pulmonary artery systolic pressure is elevated at 64 mmHg assuming a right atrial pressure of 8 mmHg. There is a large pleural effusion. CTA 3/21/17  No acute pulmonary embolus detected. Moderate bilateral pleural effusions and basilar consolidations.  Atelectasis   is also present.  Pulmonary edema is suspected.  Superimposed pneumonia   and/or aspiration may be present. Marked cardiac enlargement.  Coronary artery calcifications are heavy.      Tele: atrial fibrillation     Assessment and Plan     1) Sick sinus syndrome/Atrial fibrillation w slow ventricular response. Treatment options for atrial fibrillation discussed including rate control and anticoagulation. Avoid AV maxx blockers with bradycardia. Due to advanced age and dementia, will continue to hold off on anticoagulation. Although he may be symptomatic from the bradycardia, he does not want invasive procedures such as a pacemaker and family honors his wishes.    2) Right-sided heart failure/Cor pulmonale/pulmonary hypertension. Avoid hypoxia. Patient wishes to avoid invasive procedures. Continue Lasix.    3) CKD Stage IIIb-IV. Managed by PCP.    4) Elevated troponin. History is not consistent with ACS and flat trend is not consistent with ACS. Chronically elevated. May be related to CKD in a patient with known CAD. He is not a candidate for angiography.       5) CAD s/p CABG. Continue with medical management and risk factor modification including aspirin. No statin with previous allergy to Lipitor. No B-blocker with bradycardia.       6)

## 2018-12-11 NOTE — PATIENT INSTRUCTIONS
irregular heartbeat.     · You have symptoms of a stroke. These may include:  ? Sudden numbness, tingling, weakness, or loss of movement in your face, arm, or leg, especially on only one side of your body. ? Sudden vision changes. ? Sudden trouble speaking. ? Sudden confusion or trouble understanding simple statements. ? Sudden problems with walking or balance. ? A sudden, severe headache that is different from past headaches.     · You have severe back or belly pain.    Do not wait until your blood pressure comes down on its own. Get help right away.   Call your doctor now or seek immediate care if:    · Your blood pressure is much higher than normal (such as 180/120 or higher), but you don't have symptoms.     · You think high blood pressure is causing symptoms, such as:  ? Severe headache.  ? Blurry vision.    Watch closely for changes in your health, and be sure to contact your doctor if:    · Your blood pressure measures higher than your doctor recommends at least 2 times. That means the top number is higher or the bottom number is higher, or both.     · You think you may be having side effects from your blood pressure medicine. Where can you learn more? Go to https://PageBitespepiceweb.[employer]. org and sign in to your [employer] account. Enter E166 in the Aureliant box to learn more about \"High Blood Pressure: Care Instructions. \"     If you do not have an account, please click on the \"Sign Up Now\" link. Current as of: December 6, 2017  Content Version: 11.8  © 4540-8478 Healthwise, Incorporated. Care instructions adapted under license by [facility] [facility] ([facility]). If you have questions about a medical condition or this instruction, always ask your healthcare professional. [place] [address] any warranty or liability for your use of this information.

## 2018-12-21 PROBLEM — R79.89 ELEVATED TROPONIN: Status: RESOLVED | Noted: 2018-11-21 | Resolved: 2018-12-21

## 2018-12-21 PROBLEM — R77.8 ELEVATED TROPONIN: Status: RESOLVED | Noted: 2018-11-21 | Resolved: 2018-12-21

## 2018-12-28 ENCOUNTER — TELEPHONE (OUTPATIENT)
Dept: FAMILY MEDICINE CLINIC | Age: 83
End: 2018-12-28

## 2018-12-28 DIAGNOSIS — I10 ESSENTIAL HYPERTENSION: ICD-10-CM

## 2018-12-28 RX ORDER — LISINOPRIL 10 MG/1
10 TABLET ORAL DAILY
Qty: 90 TABLET | Refills: 1 | Status: SHIPPED | OUTPATIENT
Start: 2018-12-28 | End: 2018-12-28 | Stop reason: SDUPTHER

## 2018-12-28 RX ORDER — LISINOPRIL 10 MG/1
10 TABLET ORAL DAILY
Qty: 90 TABLET | Refills: 1 | Status: SHIPPED | OUTPATIENT
Start: 2018-12-28 | End: 2019-06-25 | Stop reason: SDUPTHER

## 2018-12-28 NOTE — TELEPHONE ENCOUNTER
Pt had a fall today and refused to go the hospital. He did get a little bump on his head but nothing serious. He did complain about not wanting to walk to the dinning vyas for lunch so the nurse tried to feed him and give him some fluids. He did drink some water. She took his BP and is was 106/46 and his HR is 40. His nurse Eric Saenz is concerned about his BP he is complaining of being dizzy and lightheaded. Please call her at [phone].     Please advise    Thanks

## 2019-02-12 ENCOUNTER — CARE COORDINATION (OUTPATIENT)
Dept: CARE COORDINATION | Age: 84
End: 2019-02-12

## 2019-02-19 ENCOUNTER — CARE COORDINATION (OUTPATIENT)
Dept: CARE COORDINATION | Age: 84
End: 2019-02-19

## 2019-03-13 ENCOUNTER — OFFICE VISIT (OUTPATIENT)
Dept: FAMILY MEDICINE CLINIC | Age: 84
End: 2019-03-13
Payer: MEDICARE

## 2019-03-13 VITALS
DIASTOLIC BLOOD PRESSURE: 70 MMHG | RESPIRATION RATE: 16 BRPM | HEIGHT: 70 IN | TEMPERATURE: 97.5 F | BODY MASS INDEX: 22.48 KG/M2 | WEIGHT: 157 LBS | SYSTOLIC BLOOD PRESSURE: 138 MMHG | HEART RATE: 40 BPM

## 2019-03-13 DIAGNOSIS — R73.9 HYPERGLYCEMIA: Chronic | ICD-10-CM

## 2019-03-13 DIAGNOSIS — J43.9 PULMONARY EMPHYSEMA, UNSPECIFIED EMPHYSEMA TYPE (HCC): Chronic | ICD-10-CM

## 2019-03-13 DIAGNOSIS — I10 ESSENTIAL HYPERTENSION: Chronic | ICD-10-CM

## 2019-03-13 DIAGNOSIS — I27.20 PULMONARY HYPERTENSION (HCC): Chronic | ICD-10-CM

## 2019-03-13 DIAGNOSIS — I27.81 COR PULMONALE, CHRONIC (HCC): Chronic | ICD-10-CM

## 2019-03-13 DIAGNOSIS — I49.5 SSS (SICK SINUS SYNDROME) (HCC): Chronic | ICD-10-CM

## 2019-03-13 DIAGNOSIS — Z00.00 MEDICARE ANNUAL WELLNESS VISIT, SUBSEQUENT: Primary | ICD-10-CM

## 2019-03-13 DIAGNOSIS — N18.30 STAGE 3 CHRONIC KIDNEY DISEASE (HCC): Chronic | ICD-10-CM

## 2019-03-13 DIAGNOSIS — E78.5 HYPERLIPIDEMIA LDL GOAL <130: Chronic | ICD-10-CM

## 2019-03-13 DIAGNOSIS — I25.10 CORONARY ARTERY DISEASE INVOLVING NATIVE CORONARY ARTERY OF NATIVE HEART WITHOUT ANGINA PECTORIS: Chronic | ICD-10-CM

## 2019-03-13 DIAGNOSIS — I48.91 ATRIAL FIBRILLATION WITH SLOW VENTRICULAR RESPONSE (HCC): Chronic | ICD-10-CM

## 2019-03-13 DIAGNOSIS — F33.42 RECURRENT MAJOR DEPRESSIVE DISORDER, IN FULL REMISSION (HCC): ICD-10-CM

## 2019-03-13 PROBLEM — N17.9 ACUTE RENAL FAILURE SUPERIMPOSED ON STAGE 3 CHRONIC KIDNEY DISEASE (HCC): Status: RESOLVED | Noted: 2018-11-21 | Resolved: 2019-03-13

## 2019-03-13 PROBLEM — R10.11 RIGHT UPPER QUADRANT ABDOMINAL PAIN: Status: RESOLVED | Noted: 2018-11-21 | Resolved: 2019-03-13

## 2019-03-13 PROBLEM — I50.33 ACUTE ON CHRONIC DIASTOLIC (CONGESTIVE) HEART FAILURE (HCC): Status: RESOLVED | Noted: 2018-11-21 | Resolved: 2019-03-13

## 2019-03-13 LAB
A/G RATIO: 1.7 (ref 1.1–2.2)
ALBUMIN SERPL-MCNC: 4.2 G/DL (ref 3.4–5)
ALP BLD-CCNC: 87 U/L (ref 40–129)
ALT SERPL-CCNC: 8 U/L (ref 10–40)
ANION GAP SERPL CALCULATED.3IONS-SCNC: 15 MMOL/L (ref 3–16)
AST SERPL-CCNC: 14 U/L (ref 15–37)
BILIRUB SERPL-MCNC: 0.4 MG/DL (ref 0–1)
BUN BLDV-MCNC: 32 MG/DL (ref 7–20)
CALCIUM SERPL-MCNC: 10.5 MG/DL (ref 8.3–10.6)
CHLORIDE BLD-SCNC: 101 MMOL/L (ref 99–110)
CHOLESTEROL, TOTAL: 131 MG/DL (ref 0–199)
CO2: 24 MMOL/L (ref 21–32)
CREAT SERPL-MCNC: 1.6 MG/DL (ref 0.8–1.3)
ESTIMATED AVERAGE GLUCOSE: 125.5 MG/DL
GFR AFRICAN AMERICAN: 49
GFR NON-AFRICAN AMERICAN: 41
GLOBULIN: 2.5 G/DL
GLUCOSE BLD-MCNC: 93 MG/DL (ref 70–99)
HBA1C MFR BLD: 6 %
HDLC SERPL-MCNC: 43 MG/DL (ref 40–60)
LDL CHOLESTEROL CALCULATED: 80 MG/DL
POTASSIUM SERPL-SCNC: 4.7 MMOL/L (ref 3.5–5.1)
SODIUM BLD-SCNC: 140 MMOL/L (ref 136–145)
TOTAL PROTEIN: 6.7 G/DL (ref 6.4–8.2)
TRIGL SERPL-MCNC: 42 MG/DL (ref 0–150)
VLDLC SERPL CALC-MCNC: 8 MG/DL

## 2019-03-13 PROCEDURE — 1101F PT FALLS ASSESS-DOCD LE1/YR: CPT | Performed by: FAMILY MEDICINE

## 2019-03-13 PROCEDURE — 1123F ACP DISCUSS/DSCN MKR DOCD: CPT | Performed by: FAMILY MEDICINE

## 2019-03-13 PROCEDURE — G8420 CALC BMI NORM PARAMETERS: HCPCS | Performed by: FAMILY MEDICINE

## 2019-03-13 PROCEDURE — 1036F TOBACCO NON-USER: CPT | Performed by: FAMILY MEDICINE

## 2019-03-13 PROCEDURE — 3023F SPIROM DOC REV: CPT | Performed by: FAMILY MEDICINE

## 2019-03-13 PROCEDURE — G8599 NO ASA/ANTIPLAT THER USE RNG: HCPCS | Performed by: FAMILY MEDICINE

## 2019-03-13 PROCEDURE — G8482 FLU IMMUNIZE ORDER/ADMIN: HCPCS | Performed by: FAMILY MEDICINE

## 2019-03-13 PROCEDURE — G8926 SPIRO NO PERF OR DOC: HCPCS | Performed by: FAMILY MEDICINE

## 2019-03-13 PROCEDURE — 4040F PNEUMOC VAC/ADMIN/RCVD: CPT | Performed by: FAMILY MEDICINE

## 2019-03-13 PROCEDURE — G8427 DOCREV CUR MEDS BY ELIG CLIN: HCPCS | Performed by: FAMILY MEDICINE

## 2019-03-13 PROCEDURE — 99214 OFFICE O/P EST MOD 30 MIN: CPT | Performed by: FAMILY MEDICINE

## 2019-03-13 PROCEDURE — G0439 PPPS, SUBSEQ VISIT: HCPCS | Performed by: FAMILY MEDICINE

## 2019-03-13 ASSESSMENT — LIFESTYLE VARIABLES: HOW OFTEN DO YOU HAVE A DRINK CONTAINING ALCOHOL: 0

## 2019-03-13 ASSESSMENT — PATIENT HEALTH QUESTIONNAIRE - PHQ9
SUM OF ALL RESPONSES TO PHQ QUESTIONS 1-9: 1
SUM OF ALL RESPONSES TO PHQ QUESTIONS 1-9: 1

## 2019-03-13 ASSESSMENT — ANXIETY QUESTIONNAIRES: GAD7 TOTAL SCORE: 1

## 2019-03-14 RX ORDER — ISOSORBIDE MONONITRATE 60 MG/1
60 TABLET, EXTENDED RELEASE ORAL DAILY
COMMUNITY
Start: 2019-03-14 | End: 2019-05-23 | Stop reason: SDUPTHER

## 2019-05-03 RX ORDER — ASPIRIN 81 MG/1
81 TABLET, CHEWABLE ORAL DAILY
Qty: 30 TABLET | Refills: 3 | Status: SHIPPED | OUTPATIENT
Start: 2019-05-03 | End: 2019-08-29 | Stop reason: SDUPTHER

## 2019-05-24 RX ORDER — ISOSORBIDE MONONITRATE 60 MG/1
60 TABLET, EXTENDED RELEASE ORAL DAILY
Qty: 90 TABLET | Refills: 0 | Status: SHIPPED | OUTPATIENT
Start: 2019-05-24 | End: 2019-09-16 | Stop reason: SDUPTHER

## 2019-05-24 RX ORDER — ROPINIROLE 2 MG/1
2 TABLET, FILM COATED ORAL NIGHTLY
Qty: 90 TABLET | Refills: 0 | Status: SHIPPED | OUTPATIENT
Start: 2019-05-24 | End: 2019-09-16 | Stop reason: SDUPTHER

## 2019-06-17 NOTE — PROGRESS NOTES
[address]      Cardiology Consult    Leticia Glez  5/16/1926 June 18, 2019    Primary Physician: Dr. Myesha Bermudez  Reason for Visit: atrial fibrillation/cor pulmonale    CC: \"some swelling\"     HPI:  The patient is 80 y.o. male with a past medical history significant for sick sinus syndrome, cor pulmonale, and atrial fibrillation presents for follow-up. He was originally seen during hospitalization in 3/2017 with complaints of chest pain and dyspnea. He is very hard of hearing and has some degree of dementia but seems to answer questions appropriately. His short term memory is poor. He was admitted 11/2018 with complaints of right sided abdominal pain. He was found to bradycardic/SSS which prompted the consult. He and family do not want any invasive procedures and did not want to undergo a pacemaker. Today, he is accompanied by his son. He endorses intermittent episodes of lightheadedness but denies any recent falls or syncopal episodes. He utilizes a walker with ambulation. He has no new cardiac complaints and in general feels well. He endorses unchanged LE edema and intermittent dyspnea. Patient denies exertional chest pain/pressure, PND, orthopnea, palpitations, lightheadedness, and syncope. He previously endorsed his wishes for DNR/DNI comfort care only.      Past Medical History:   Diagnosis Date    A-fib [facility])     Abnormal CXR (chest x-ray)     right costophrenic scar    Acute respiratory failure (HCC)     Allergic rhinitis     Anxiety     Arthritis     Atrial fibrillation (HCC)     CAD (coronary artery disease)     Carotid artery disease- LICA < 83% (repeat 3/91) 9/6/2011    Central hearing loss     CHF (congestive heart failure) (HCC)     Chronic kidney disease     Colon polyp- last colon 5/22/09 5/19/2011    COPD (chronic obstructive pulmonary disease) (HCC)     Dementia     Dementia     Depression     Dizziness     Eczema     GERD (gastroesophageal reflux disease)     CHILDRENS) 81 MG chewable tablet Take 1 tablet by mouth daily 30 tablet 3    lisinopril (PRINIVIL;ZESTRIL) 10 MG tablet Take 1 tablet by mouth daily 90 tablet 1    Multiple Vitamins-Minerals (THERAPEUTIC MULTIVITAMIN-MINERALS) tablet Take 1 tablet by mouth daily 30 tablet 11    furosemide (LASIX) 20 MG tablet Take 2-3 tablets by mouth daily Take 40 mg daily. Increase to 60 mg if weight over 164 lbs. 60 tablet 5    calcium citrate-vitamin D (CITRACAL MAXIMUM) 315-250 MG-UNIT TABS per tablet Take 2 tablets by mouth 2 times daily (with meals) 120 tablet 1    docusate sodium (COLACE) 100 MG capsule Take 1 capsule by mouth 2 times daily 60 capsule 5    gabapentin (NEURONTIN) 300 MG capsule Take 1 capsule by mouth 2 times daily 180 capsule 1    hydrALAZINE (APRESOLINE) 100 MG tablet Take 1 tablet by mouth 3 times daily 90 tablet 1    acetaminophen (TYLENOL) 325 MG tablet Take 650 mg by mouth every 6 hours as needed for Pain      Incontinence Supply Disposable (INCONTINENCE BRIEF MEDIUM) MISC three times per day as needed 100 each 11    gemfibrozil (LOPID) 600 MG tablet Take 1 tablet by mouth 2 times daily (before meals) 180 tablet 3     No current facility-administered medications for this visit. Review of Systems:  · Constitutional: no unanticipated weight loss. There's been no change in energy level, sleep pattern, or activity level. No fevers, chills. · Eyes: No visual changes or diplopia. No scleral icterus. · ENT: No Headaches, hearing loss or vertigo. No mouth sores or sore throat. · Cardiovascular: as reviewed in HPI  · Respiratory: No cough or wheezing, no sputum production. No hematemesis. · Gastrointestinal: No abdominal pain, appetite loss, blood in stools. No change in bowel or bladder habits. · Genitourinary: No dysuria, trouble voiding, or hematuria. · Musculoskeletal:  No gait disturbance, no joint complaints. · Integumentary: No rash or pruritis.   · Neurological: No headache, diplopia, change in muscle strength, numbness or tingling. · Psychiatric: No anxiety or depression. · Endocrine: No temperature intolerance. No excessive thirst, fluid intake, or urination. No tremor. · Hematologic/Lymphatic: No abnormal bruising or bleeding, blood clots or swollen lymph nodes. · Allergic/Immunologic: No nasal congestion or hives. Physical Exam:   BP (!) 156/50 (Site: Left Upper Arm, Position: Sitting, Cuff Size: Medium Adult)   Pulse (!) 39   Ht 5' 10\" (1.778 m)   Wt 162 lb (73.5 kg) Comment: with shoes  SpO2 100%   BMI 23.24 kg/m²   Wt Readings from Last 3 Encounters:   19 162 lb (73.5 kg)   19 157 lb (71.2 kg)   18 168 lb (76.2 kg)     Constitutional: The patient is oriented to person, place, and time. Elderly. Hard of hearing. Head: Normocephalic and atraumatic. Pupils equal and round. Neck: Neck supple. No JVP or carotid bruit appreciated. No mass and no thyromegaly present. No lymphadenopathy present. Cardiovascular: Irreg and bradycardic. Normal heart sounds. Exam reveals no gallop and no friction rub. No murmur heard. Pulmonary/Chest: Effort normal and breath sounds normal. No respiratory distress. No wheezes, rhonchi or rales. Abdominal: Soft, non-tender. Bowel sounds are normal. Exhibits no organomegaly, mass or bruit. Extremities: 1 + BLE edema. No cyanosis or clubbing. Pulses are 2+ radial/carotid bilaterally. Neurological: No gross cranial nerve deficit. Coordination normal.   Skin: Skin is warm and dry. There is no rash or diaphoresis. Psychiatric: Patient has a normal mood and affect. Speech is normal and behavior is normal.     Lab Review:   FLP:    Lab Results   Component Value Date    TRIG 42 2019    HDL 43 2019    LDLCALC 80 2019    LABVLDL 8 2019     BUN/Creatinine:    Lab Results   Component Value Date    BUN 32 2019    CREATININE 1.6 2019     Cardiac Data      EK/10/17 Atrial fibrillation. LBBB. elevated, denies any chest pains or worsening dyspnea. May be related to CKD in a patient with known CAD. He is not a candidate for angiography.       5) CAD s/p CABG. Continue with medical management and risk factor modification including aspirin. No statin with previous allergy to Lipitor. No B-blocker with bradycardia.    6) Essential hypertension. Elevated today but a liberalized BP goal is reasonable in a nonagenarian with dementia.    7) Dementia. Comfort care measures seems most appropriate. Follow up in 6 months    Thank you very much for allowing me to participate in the care of your patient. Please do not hesitate to contact me if you have any questions. Sincerely,  Jackie Jones. Terrie Moore, [address] [place] [address], [address] [place] [address]  Ph: [phone]  Fax: [phone]    This note was scribed in the presence of Dr Terrie Moore MD by Goldy Timmons RN. Physician Attestation: The scribes documentation has been prepared under my direction and personally reviewed by me in its entirety. I confirm that the note above accurately reflects all work, treatment, procedures, and medical decision making performed by me. All portions of the note including but not limited to the chief complaint, history of present illness, physical exam, assessment and plan/medical decision making were personally reviewed, edited, and updated on the day of the visit.

## 2019-06-18 ENCOUNTER — OFFICE VISIT (OUTPATIENT)
Dept: CARDIOLOGY CLINIC | Age: 84
End: 2019-06-18
Payer: MEDICARE

## 2019-06-18 VITALS
DIASTOLIC BLOOD PRESSURE: 50 MMHG | HEART RATE: 39 BPM | SYSTOLIC BLOOD PRESSURE: 156 MMHG | HEIGHT: 70 IN | OXYGEN SATURATION: 100 % | WEIGHT: 162 LBS | BODY MASS INDEX: 23.19 KG/M2

## 2019-06-18 DIAGNOSIS — I50.812 CHRONIC RIGHT-SIDED HEART FAILURE (HCC): ICD-10-CM

## 2019-06-18 DIAGNOSIS — I10 ESSENTIAL HYPERTENSION: ICD-10-CM

## 2019-06-18 DIAGNOSIS — N18.30 STAGE 3 CHRONIC KIDNEY DISEASE (HCC): ICD-10-CM

## 2019-06-18 DIAGNOSIS — I25.10 CORONARY ARTERY DISEASE INVOLVING NATIVE CORONARY ARTERY OF NATIVE HEART WITHOUT ANGINA PECTORIS: ICD-10-CM

## 2019-06-18 DIAGNOSIS — I48.91 ATRIAL FIBRILLATION WITH SLOW VENTRICULAR RESPONSE (HCC): Primary | Chronic | ICD-10-CM

## 2019-06-18 DIAGNOSIS — I49.5 SSS (SICK SINUS SYNDROME) (HCC): ICD-10-CM

## 2019-06-18 DIAGNOSIS — I27.81 COR PULMONALE, CHRONIC (HCC): ICD-10-CM

## 2019-06-18 DIAGNOSIS — Z95.1 S/P CABG (CORONARY ARTERY BYPASS GRAFT): ICD-10-CM

## 2019-06-18 PROCEDURE — 99214 OFFICE O/P EST MOD 30 MIN: CPT | Performed by: INTERNAL MEDICINE

## 2019-06-18 PROCEDURE — 4040F PNEUMOC VAC/ADMIN/RCVD: CPT | Performed by: INTERNAL MEDICINE

## 2019-06-18 PROCEDURE — 1036F TOBACCO NON-USER: CPT | Performed by: INTERNAL MEDICINE

## 2019-06-18 PROCEDURE — G8420 CALC BMI NORM PARAMETERS: HCPCS | Performed by: INTERNAL MEDICINE

## 2019-06-18 PROCEDURE — G8427 DOCREV CUR MEDS BY ELIG CLIN: HCPCS | Performed by: INTERNAL MEDICINE

## 2019-06-18 PROCEDURE — G8598 ASA/ANTIPLAT THER USED: HCPCS | Performed by: INTERNAL MEDICINE

## 2019-06-18 PROCEDURE — 1123F ACP DISCUSS/DSCN MKR DOCD: CPT | Performed by: INTERNAL MEDICINE

## 2019-06-18 PROCEDURE — 93000 ELECTROCARDIOGRAM COMPLETE: CPT | Performed by: INTERNAL MEDICINE

## 2019-06-18 RX ORDER — CHLORAL HYDRATE 500 MG
1000 CAPSULE ORAL NIGHTLY
COMMUNITY
End: 2019-11-23

## 2019-06-18 NOTE — PATIENT INSTRUCTIONS
Patient Education        Learning About Coronary Artery Disease (CAD)  What is coronary artery disease? Coronary artery disease (CAD) occurs when plaque builds up in the arteries that bring oxygen-rich blood to your heart. Plaque is a fatty substance made of cholesterol, calcium, and other substances in the blood. This process is called hardening of the arteries, or atherosclerosis. What happens when you have coronary artery disease? · Plaque may narrow the coronary arteries. Narrowed arteries cause poor blood flow. This can lead to angina symptoms such as chest pain or discomfort. If blood flow is completely blocked, you could have a heart attack. · You can slow CAD and reduce the risk of future problems by making changes in your lifestyle. These include quitting smoking and eating heart-healthy foods. · Treatments for CAD, along with changes in your lifestyle, can help you live a longer and healthier life. How can you prevent coronary artery disease? · Do not smoke. It may be the best thing you can do to prevent heart disease. If you need help quitting, talk to your doctor about stop-smoking programs and medicines. These can increase your chances of quitting for good. · Be active. Get at least 30 minutes of exercise on most days of the week. Walking is a good choice. You also may want to do other activities, such as running, swimming, cycling, or playing tennis or team sports. · Eat heart-healthy foods. Eat more fruits and vegetables and less foods that contain saturated and trans fats. Limit alcohol, sodium, and sweets. · Stay at a healthy weight. Lose weight if you need to. · Manage other health problems such as diabetes, high blood pressure, and high cholesterol. · Manage stress. Stress can hurt your heart. To keep stress low, talk about your problems and feelings. Don't keep your feelings hidden. · If you have talked about it with your doctor, take a low-dose aspirin every day.  Aspirin can help certain people lower their risk of a heart attack or stroke. But taking aspirin isn't right for everyone, because it can cause serious bleeding. Do not start taking daily aspirin unless your doctor knows about it. How is coronary artery disease treated? · Your doctor will suggest that you make lifestyle changes. For example, your doctor may ask you to eat healthy foods, quit smoking, lose extra weight, and be more active. · You will have to take medicines. · Your doctor may suggest a procedure to open narrowed or blocked arteries. This is called angioplasty. Or your doctor may suggest using healthy blood vessels to create detours around narrowed or blocked arteries. This is called bypass surgery. Follow-up care is a key part of your treatment and safety. Be sure to make and go to all appointments, and call your doctor if you are having problems. It's also a good idea to know your test results and keep a list of the medicines you take. Where can you learn more? Go to https://[employer]. org and sign in to your OpenSpark account. Enter [phone] in the obopay box to learn more about \"Learning About Coronary Artery Disease (CAD). \"     If you do not have an account, please click on the \"Sign Up Now\" link. Current as of: July 22, 2018  Content Version: 12.0  © 3129-9580 Healthwise, Incorporated. Care instructions adapted under license by [facility] ([facility]). If you have questions about a medical condition or this instruction, always ask your healthcare professional. [place] [address] any warranty or liability for your use of this information.

## 2019-06-25 DIAGNOSIS — I10 ESSENTIAL HYPERTENSION: ICD-10-CM

## 2019-06-25 RX ORDER — LISINOPRIL 10 MG/1
10 TABLET ORAL DAILY
Qty: 90 TABLET | Refills: 1 | Status: SHIPPED | OUTPATIENT
Start: 2019-06-25 | End: 2020-04-22 | Stop reason: SDUPTHER

## 2019-07-05 ENCOUNTER — TELEPHONE (OUTPATIENT)
Dept: FAMILY MEDICINE CLINIC | Age: 84
End: 2019-07-05

## 2019-07-05 DIAGNOSIS — H61.20 IMPACTED CERUMEN, UNSPECIFIED LATERALITY: Primary | ICD-10-CM

## 2019-07-18 ENCOUNTER — OFFICE VISIT (OUTPATIENT)
Dept: ENT CLINIC | Age: 84
End: 2019-07-18
Payer: MEDICARE

## 2019-07-18 VITALS
WEIGHT: 157 LBS | TEMPERATURE: 97 F | DIASTOLIC BLOOD PRESSURE: 50 MMHG | HEIGHT: 70 IN | HEART RATE: 42 BPM | BODY MASS INDEX: 22.48 KG/M2 | SYSTOLIC BLOOD PRESSURE: 164 MMHG

## 2019-07-18 DIAGNOSIS — H90.3 SENSORINEURAL HEARING LOSS (SNHL) OF BOTH EARS: ICD-10-CM

## 2019-07-18 DIAGNOSIS — H61.23 BILATERAL IMPACTED CERUMEN: Primary | ICD-10-CM

## 2019-07-18 PROCEDURE — G8427 DOCREV CUR MEDS BY ELIG CLIN: HCPCS | Performed by: OTOLARYNGOLOGY

## 2019-07-18 PROCEDURE — G8420 CALC BMI NORM PARAMETERS: HCPCS | Performed by: OTOLARYNGOLOGY

## 2019-07-18 PROCEDURE — G8598 ASA/ANTIPLAT THER USED: HCPCS | Performed by: OTOLARYNGOLOGY

## 2019-07-18 PROCEDURE — 69210 REMOVE IMPACTED EAR WAX UNI: CPT | Performed by: OTOLARYNGOLOGY

## 2019-07-18 PROCEDURE — 1123F ACP DISCUSS/DSCN MKR DOCD: CPT | Performed by: OTOLARYNGOLOGY

## 2019-07-18 PROCEDURE — 1036F TOBACCO NON-USER: CPT | Performed by: OTOLARYNGOLOGY

## 2019-07-18 PROCEDURE — 4040F PNEUMOC VAC/ADMIN/RCVD: CPT | Performed by: OTOLARYNGOLOGY

## 2019-07-18 PROCEDURE — 99203 OFFICE O/P NEW LOW 30 MIN: CPT | Performed by: OTOLARYNGOLOGY

## 2019-07-18 ASSESSMENT — ENCOUNTER SYMPTOMS
NAUSEA: 0
PHOTOPHOBIA: 0
SINUS PRESSURE: 0
EYE DISCHARGE: 0
SINUS PAIN: 0
BLOOD IN STOOL: 0
SORE THROAT: 0
CONSTIPATION: 0
STRIDOR: 0
COUGH: 0
CHOKING: 0
FACIAL SWELLING: 0
COLOR CHANGE: 0
RHINORRHEA: 0
VOMITING: 0
VOICE CHANGE: 0
EYE ITCHING: 0
WHEEZING: 0
BACK PAIN: 0
DIARRHEA: 0
SHORTNESS OF BREATH: 0
TROUBLE SWALLOWING: 0

## 2019-07-18 NOTE — PROGRESS NOTES
Normal rate and regular rhythm. Pulmonary/Chest: No stridor. No apnea, no tachypnea and no bradypnea. No respiratory distress. Musculoskeletal:        Right shoulder: He exhibits normal range of motion. Lymphadenopathy:        Head (right side): No submental, no submandibular, no tonsillar, no preauricular, no posterior auricular and no occipital adenopathy present. Head (left side): No submental, no submandibular, no tonsillar, no preauricular, no posterior auricular and no occipital adenopathy present. He has no cervical adenopathy. Right cervical: No superficial cervical, no deep cervical and no posterior cervical adenopathy present. Left cervical: No superficial cervical, no deep cervical and no posterior cervical adenopathy present. Neurological: He is alert and oriented to person, place, and time. No cranial nerve deficit. Skin: Skin is warm and dry. No bruising, no laceration, no lesion and no rash noted. No erythema. Psychiatric: He has a normal mood and affect. His speech is normal and behavior is normal.         Procedure     Removal Impacted Cerumen    An operating microscope was utilized to visualize the external auditory canals using a 5mm speculum. Cerumen was removed with curettes and landry suctions on both sides. The tympanic membrane is intact. No fluid visualized in the middle ear. No complications. Assessment and Plan     1. Bilateral impacted cerumen  Cerumen removed with instruments in both ears. At this point I recommend the patient follow-up with his audiologist again to have the hearing aids evaluated and possibly adjusted. If he has any other issues regarding this or abnormalities of the audiogram he should follow-up to discuss. 2. Sensorineural hearing loss (SNHL) of both ears        Return if symptoms worsen or fail to improve. Portions of this note were dictated using Dragon.  There may be linguistic errors secondary to the use of this program.

## 2019-07-25 ENCOUNTER — HOSPITAL ENCOUNTER (OUTPATIENT)
Age: 84
Setting detail: OBSERVATION
Discharge: HOME | End: 2019-07-26
Attending: EMERGENCY MEDICINE | Admitting: FAMILY MEDICINE
Payer: MEDICARE

## 2019-07-25 ENCOUNTER — APPOINTMENT (OUTPATIENT)
Dept: GENERAL RADIOLOGY | Age: 84
End: 2019-07-25
Payer: MEDICARE

## 2019-07-25 DIAGNOSIS — R07.9 CHEST PAIN, UNSPECIFIED TYPE: ICD-10-CM

## 2019-07-25 DIAGNOSIS — R00.1 BRADYCARDIA: Primary | ICD-10-CM

## 2019-07-25 LAB
A/G RATIO: 1.5 (ref 1.1–2.2)
ALBUMIN SERPL-MCNC: 4 G/DL (ref 3.4–5)
ALP BLD-CCNC: 80 U/L (ref 40–129)
ALT SERPL-CCNC: 8 U/L (ref 10–40)
ANION GAP SERPL CALCULATED.3IONS-SCNC: 12 MMOL/L (ref 3–16)
AST SERPL-CCNC: 15 U/L (ref 15–37)
BASOPHILS ABSOLUTE: 0 K/UL (ref 0–0.2)
BASOPHILS RELATIVE PERCENT: 0.6 %
BILIRUB SERPL-MCNC: 0.3 MG/DL (ref 0–1)
BUN BLDV-MCNC: 30 MG/DL (ref 7–20)
CALCIUM SERPL-MCNC: 10.1 MG/DL (ref 8.3–10.6)
CHLORIDE BLD-SCNC: 98 MMOL/L (ref 99–110)
CO2: 29 MMOL/L (ref 21–32)
CREAT SERPL-MCNC: 1.6 MG/DL (ref 0.8–1.3)
EKG ATRIAL RATE: 16 BPM
EKG ATRIAL RATE: 27 BPM
EKG DIAGNOSIS: NORMAL
EKG DIAGNOSIS: NORMAL
EKG Q-T INTERVAL: 560 MS
EKG Q-T INTERVAL: 582 MS
EKG QRS DURATION: 144 MS
EKG QRS DURATION: 148 MS
EKG QTC CALCULATION (BAZETT): 389 MS
EKG QTC CALCULATION (BAZETT): 439 MS
EKG R AXIS: -49 DEGREES
EKG R AXIS: -59 DEGREES
EKG T AXIS: 243 DEGREES
EKG T AXIS: 251 DEGREES
EKG VENTRICULAR RATE: 27 BPM
EKG VENTRICULAR RATE: 37 BPM
EOSINOPHILS ABSOLUTE: 0.1 K/UL (ref 0–0.6)
EOSINOPHILS RELATIVE PERCENT: 1.9 %
GFR AFRICAN AMERICAN: 49
GFR NON-AFRICAN AMERICAN: 41
GLOBULIN: 2.7 G/DL
GLUCOSE BLD-MCNC: 152 MG/DL (ref 70–99)
HCT VFR BLD CALC: 33.4 % (ref 40.5–52.5)
HEMOGLOBIN: 11.3 G/DL (ref 13.5–17.5)
LIPASE: 32 U/L (ref 13–60)
LYMPHOCYTES ABSOLUTE: 2.1 K/UL (ref 1–5.1)
LYMPHOCYTES RELATIVE PERCENT: 35.2 %
MCH RBC QN AUTO: 32.2 PG (ref 26–34)
MCHC RBC AUTO-ENTMCNC: 33.8 G/DL (ref 31–36)
MCV RBC AUTO: 95.4 FL (ref 80–100)
MONOCYTES ABSOLUTE: 0.5 K/UL (ref 0–1.3)
MONOCYTES RELATIVE PERCENT: 8.4 %
NEUTROPHILS ABSOLUTE: 3.2 K/UL (ref 1.7–7.7)
NEUTROPHILS RELATIVE PERCENT: 53.9 %
PDW BLD-RTO: 13.6 % (ref 12.4–15.4)
PLATELET # BLD: 188 K/UL (ref 135–450)
PMV BLD AUTO: 6.8 FL (ref 5–10.5)
POTASSIUM REFLEX MAGNESIUM: 4.9 MMOL/L (ref 3.5–5.1)
RBC # BLD: 3.5 M/UL (ref 4.2–5.9)
SODIUM BLD-SCNC: 139 MMOL/L (ref 136–145)
TOTAL PROTEIN: 6.7 G/DL (ref 6.4–8.2)
TROPONIN: 0.01 NG/ML
TROPONIN: 0.02 NG/ML
TROPONIN: <0.01 NG/ML
WBC # BLD: 6 K/UL (ref 4–11)

## 2019-07-25 PROCEDURE — 83690 ASSAY OF LIPASE: CPT

## 2019-07-25 PROCEDURE — 96372 THER/PROPH/DIAG INJ SC/IM: CPT

## 2019-07-25 PROCEDURE — G0378 HOSPITAL OBSERVATION PER HR: HCPCS

## 2019-07-25 PROCEDURE — 36415 COLL VENOUS BLD VENIPUNCTURE: CPT

## 2019-07-25 PROCEDURE — 84484 ASSAY OF TROPONIN QUANT: CPT

## 2019-07-25 PROCEDURE — 85025 COMPLETE CBC W/AUTO DIFF WBC: CPT

## 2019-07-25 PROCEDURE — 2580000003 HC RX 258: Performed by: FAMILY MEDICINE

## 2019-07-25 PROCEDURE — 6370000000 HC RX 637 (ALT 250 FOR IP): Performed by: EMERGENCY MEDICINE

## 2019-07-25 PROCEDURE — 93010 ELECTROCARDIOGRAM REPORT: CPT | Performed by: INTERNAL MEDICINE

## 2019-07-25 PROCEDURE — 6370000000 HC RX 637 (ALT 250 FOR IP): Performed by: FAMILY MEDICINE

## 2019-07-25 PROCEDURE — 93005 ELECTROCARDIOGRAM TRACING: CPT | Performed by: EMERGENCY MEDICINE

## 2019-07-25 PROCEDURE — 99285 EMERGENCY DEPT VISIT HI MDM: CPT

## 2019-07-25 PROCEDURE — 94760 N-INVAS EAR/PLS OXIMETRY 1: CPT

## 2019-07-25 PROCEDURE — 6360000002 HC RX W HCPCS: Performed by: FAMILY MEDICINE

## 2019-07-25 PROCEDURE — 80053 COMPREHEN METABOLIC PANEL: CPT

## 2019-07-25 PROCEDURE — 71045 X-RAY EXAM CHEST 1 VIEW: CPT

## 2019-07-25 RX ORDER — FUROSEMIDE 40 MG/1
40 TABLET ORAL DAILY
Status: DISCONTINUED | OUTPATIENT
Start: 2019-07-26 | End: 2019-07-26 | Stop reason: HOSPADM

## 2019-07-25 RX ORDER — CYANOCOBALAMIN (VITAMIN B-12) 1000 MCG
2 TABLET, EXTENDED RELEASE ORAL 2 TIMES DAILY WITH MEALS
Status: DISCONTINUED | OUTPATIENT
Start: 2019-07-25 | End: 2019-07-25 | Stop reason: CLARIF

## 2019-07-25 RX ORDER — ASPIRIN 81 MG/1
324 TABLET, CHEWABLE ORAL ONCE
Status: COMPLETED | OUTPATIENT
Start: 2019-07-25 | End: 2019-07-25

## 2019-07-25 RX ORDER — ONDANSETRON 2 MG/ML
4 INJECTION INTRAMUSCULAR; INTRAVENOUS EVERY 6 HOURS PRN
Status: DISCONTINUED | OUTPATIENT
Start: 2019-07-25 | End: 2019-07-26 | Stop reason: HOSPADM

## 2019-07-25 RX ORDER — LISINOPRIL 10 MG/1
10 TABLET ORAL DAILY
Status: DISCONTINUED | OUTPATIENT
Start: 2019-07-26 | End: 2019-07-26 | Stop reason: HOSPADM

## 2019-07-25 RX ORDER — ISOSORBIDE MONONITRATE 60 MG/1
60 TABLET, EXTENDED RELEASE ORAL DAILY
Status: DISCONTINUED | OUTPATIENT
Start: 2019-07-26 | End: 2019-07-26 | Stop reason: HOSPADM

## 2019-07-25 RX ORDER — HYDRALAZINE HYDROCHLORIDE 50 MG/1
100 TABLET, FILM COATED ORAL 3 TIMES DAILY
Status: DISCONTINUED | OUTPATIENT
Start: 2019-07-25 | End: 2019-07-26 | Stop reason: HOSPADM

## 2019-07-25 RX ORDER — ASPIRIN 81 MG/1
81 TABLET, CHEWABLE ORAL DAILY
Status: DISCONTINUED | OUTPATIENT
Start: 2019-07-26 | End: 2019-07-26 | Stop reason: HOSPADM

## 2019-07-25 RX ORDER — NITROGLYCERIN 0.4 MG/1
0.4 TABLET SUBLINGUAL EVERY 5 MIN PRN
Status: DISCONTINUED | OUTPATIENT
Start: 2019-07-25 | End: 2019-07-26 | Stop reason: HOSPADM

## 2019-07-25 RX ORDER — GABAPENTIN 300 MG/1
300 CAPSULE ORAL 2 TIMES DAILY
Status: DISCONTINUED | OUTPATIENT
Start: 2019-07-25 | End: 2019-07-26 | Stop reason: HOSPADM

## 2019-07-25 RX ORDER — GEMFIBROZIL 600 MG/1
600 TABLET, FILM COATED ORAL
Status: DISCONTINUED | OUTPATIENT
Start: 2019-07-25 | End: 2019-07-26 | Stop reason: HOSPADM

## 2019-07-25 RX ORDER — SODIUM CHLORIDE 0.9 % (FLUSH) 0.9 %
10 SYRINGE (ML) INJECTION PRN
Status: DISCONTINUED | OUTPATIENT
Start: 2019-07-25 | End: 2019-07-26 | Stop reason: HOSPADM

## 2019-07-25 RX ORDER — ACETAMINOPHEN 325 MG/1
650 TABLET ORAL EVERY 6 HOURS PRN
Status: DISCONTINUED | OUTPATIENT
Start: 2019-07-25 | End: 2019-07-26 | Stop reason: HOSPADM

## 2019-07-25 RX ORDER — DOCUSATE SODIUM 100 MG/1
100 CAPSULE, LIQUID FILLED ORAL 2 TIMES DAILY
Status: DISCONTINUED | OUTPATIENT
Start: 2019-07-25 | End: 2019-07-26 | Stop reason: HOSPADM

## 2019-07-25 RX ORDER — ROPINIROLE 1 MG/1
2 TABLET, FILM COATED ORAL NIGHTLY
Status: DISCONTINUED | OUTPATIENT
Start: 2019-07-25 | End: 2019-07-26 | Stop reason: HOSPADM

## 2019-07-25 RX ORDER — SODIUM CHLORIDE 0.9 % (FLUSH) 0.9 %
10 SYRINGE (ML) INJECTION EVERY 12 HOURS SCHEDULED
Status: DISCONTINUED | OUTPATIENT
Start: 2019-07-25 | End: 2019-07-26 | Stop reason: HOSPADM

## 2019-07-25 RX ADMIN — DOCUSATE SODIUM 100 MG: 100 CAPSULE, LIQUID FILLED ORAL at 20:40

## 2019-07-25 RX ADMIN — ROPINIROLE HYDROCHLORIDE 2 MG: 1 TABLET, FILM COATED ORAL at 20:40

## 2019-07-25 RX ADMIN — GABAPENTIN 300 MG: 300 CAPSULE ORAL at 20:41

## 2019-07-25 RX ADMIN — NITROGLYCERIN 0.4 MG: 0.4 TABLET, ORALLY DISINTEGRATING SUBLINGUAL at 12:54

## 2019-07-25 RX ADMIN — HYDRALAZINE HYDROCHLORIDE 100 MG: 50 TABLET, FILM COATED ORAL at 20:41

## 2019-07-25 RX ADMIN — ENOXAPARIN SODIUM 30 MG: 30 INJECTION SUBCUTANEOUS at 20:41

## 2019-07-25 RX ADMIN — Medication 10 ML: at 20:43

## 2019-07-25 RX ADMIN — GEMFIBROZIL 600 MG: 600 TABLET ORAL at 18:00

## 2019-07-25 RX ADMIN — ASPIRIN 81 MG 324 MG: 81 TABLET ORAL at 12:52

## 2019-07-25 RX ADMIN — OYSTER SHELL CALCIUM WITH VITAMIN D 1 TABLET: 500; 200 TABLET, FILM COATED ORAL at 17:59

## 2019-07-25 ASSESSMENT — PAIN SCALES - GENERAL
PAINLEVEL_OUTOF10: 2
PAINLEVEL_OUTOF10: 0

## 2019-07-25 ASSESSMENT — PAIN DESCRIPTION - DESCRIPTORS
DESCRIPTORS: NUMBNESS

## 2019-07-25 ASSESSMENT — PAIN DESCRIPTION - PROGRESSION
CLINICAL_PROGRESSION: GRADUALLY IMPROVING
CLINICAL_PROGRESSION: GRADUALLY IMPROVING

## 2019-07-25 ASSESSMENT — PAIN DESCRIPTION - FREQUENCY
FREQUENCY: CONTINUOUS

## 2019-07-25 ASSESSMENT — PAIN DESCRIPTION - ONSET
ONSET: ON-GOING

## 2019-07-25 ASSESSMENT — PAIN DESCRIPTION - PAIN TYPE
TYPE: ACUTE PAIN

## 2019-07-25 ASSESSMENT — PAIN DESCRIPTION - LOCATION
LOCATION: SHOULDER;FINGER (COMMENT WHICH ONE)
LOCATION: FINGER (COMMENT WHICH ONE)
LOCATION: ARM

## 2019-07-25 ASSESSMENT — PAIN DESCRIPTION - ORIENTATION
ORIENTATION: LEFT

## 2019-07-25 NOTE — ED PROVIDER NOTES
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 He has never used smokeless tobacco. He reports that he does not drink alcohol or use drugs. SURGICAL HISTORY  Past Surgical History:   Procedure Laterality Date    CARDIAC SURGERY      COLONOSCOPY      CORONARY ARTERY BYPASS GRAFT  12/2005    CYSTOSCOPY  04/14/2017    CYSTOSCOPY, EVACUATION OF CLOTS, FULGURATION, BLADDER BIOPSY    EYE SURGERY  1998    LEFT RETINAL TEAR    INNER EAR SURGERY  1999    FOR VERTIGO    LUNG BIOPSY  1971    wedge resection, RLL, benign     PROSTATE SURGERY  2001    redo TURP for cancer    SKIN BIOPSY      TURP  1981       CURRENT MEDICATIONS  Current Outpatient Rx   Medication Sig Dispense Refill    lisinopril (PRINIVIL;ZESTRIL) 10 MG tablet Take 1 tablet by mouth daily 90 tablet 1    Omega-3 Fatty Acids (FISH OIL) 1000 MG CAPS Take 3,000 mg by mouth 3 times daily      sertraline (ZOLOFT) 50 MG tablet Take 1 tablet by mouth daily 90 tablet 0    rOPINIRole (REQUIP) 2 MG tablet Take 1 tablet by mouth nightly 90 tablet 0    isosorbide mononitrate (IMDUR) 60 MG extended release tablet Take 1 tablet by mouth daily Dr. Louie Alejandro - Cardio 90 tablet 0    aspirin (ASPIRIN CHILDRENS) 81 MG chewable tablet Take 1 tablet by mouth daily 30 tablet 3    Multiple Vitamins-Minerals (THERAPEUTIC MULTIVITAMIN-MINERALS) tablet Take 1 tablet by mouth daily 30 tablet 11    furosemide (LASIX) 20 MG tablet Take 2-3 tablets by mouth daily Take 40 mg daily. Increase to 60 mg if weight over 164 lbs.  60 tablet 5    calcium citrate-vitamin D (CITRACAL MAXIMUM) 315-250 MG-UNIT TABS per tablet Take 2 tablets by mouth 2 times daily (with meals) 120 tablet 1    docusate sodium (COLACE) 100 MG capsule Take 1 capsule by mouth 2 times daily 60 capsule 5    gabapentin (NEURONTIN) 300 MG capsule Take 1 capsule by mouth 2 times daily 180 capsule 1    hydrALAZINE (APRESOLINE) 100 MG tablet Take 1 tablet by mouth 3 times daily 90 tablet 1    acetaminophen (TYLENOL) 325 MG tablet Take 650 mg by mouth every 6 hours as needed for Pain      Incontinence Supply Disposable (INCONTINENCE BRIEF MEDIUM) MISC three times per day as needed 100 each 11    gemfibrozil (LOPID) 600 MG tablet Take 1 tablet by mouth 2 times daily (before meals) 180 tablet 3       ALLERGIES  Allergies   Allergen Reactions    Flexeril [Cyclobenzaprine Hcl]     Hydrocod Polst-Cpm Polst Er     Hydrocodone     Lipitor     Sudafed [Pseudoephedrine Hcl]        WELLS Criteria  ? PHYSICAL EXAM  VITAL SIGNS: BP (!) 180/51   Pulse (!) 27   Temp 97.7 °F (36.5 °C)   Resp 20   SpO2 99%   Constitutional: Well-developed, well-nourished, appears normal, nontoxic, activity: Resting currently on the cart, holding his left arm, non-ill-appearing  HENT: Normocephalic, Atraumatic, Bilateral ears are normal, Oropharynx moist, No oral exudates, Nose normal.  Hearing aids are in place. Eyes: PERRLA, EOMI, Conjunctiva normal, No discharge. No scleral icterus. Neck: Normal range of motion, No tenderness, Supple,  Lymphatic: No lymphadenopathy noted. Cardiovascular: bradycardic heart rate, irregular rhythm, no murmurs, no clicks, no rubs, no gallops  Thorax & Lungs: normal breath sounds, no respiratory distress, no wheezing, no rales, no rhonchi  Abdomen: Soft, no tender with no distension, no masses, no pulsatile masses, no hepatosplenomegaly, normal bowel sounds  Skin: Warm, Dry, No erythema, No rash. Back: No tenderness, Full range of motion, No scoliosis. Extremities: No edema, No tenderness, No cyanosis, No clubbing. No amputations, capillary refill less than 2 seconds.   Musculoskeletal: Good

## 2019-07-25 NOTE — ED NOTES
SBAR to Grace Andrea, all questions answered. Patient has 20G in R wrist.  Patient was SB, AFIB on cardiac monitor, breathing regular, no distress, is person, place  and time. Sent to room 4117 via stretcher and transport.      Herve Breen RN  07/25/19 [id number]

## 2019-07-26 VITALS
HEIGHT: 72 IN | DIASTOLIC BLOOD PRESSURE: 56 MMHG | RESPIRATION RATE: 14 BRPM | OXYGEN SATURATION: 93 % | TEMPERATURE: 97.5 F | HEART RATE: 36 BPM | SYSTOLIC BLOOD PRESSURE: 166 MMHG | BODY MASS INDEX: 20.63 KG/M2 | WEIGHT: 152.34 LBS

## 2019-07-26 PROBLEM — R07.9 CHEST PAIN: Status: RESOLVED | Noted: 2019-07-25 | Resolved: 2019-07-26

## 2019-07-26 LAB
ANION GAP SERPL CALCULATED.3IONS-SCNC: 12 MMOL/L (ref 3–16)
BUN BLDV-MCNC: 30 MG/DL (ref 7–20)
CALCIUM SERPL-MCNC: 9.6 MG/DL (ref 8.3–10.6)
CHLORIDE BLD-SCNC: 100 MMOL/L (ref 99–110)
CO2: 26 MMOL/L (ref 21–32)
CREAT SERPL-MCNC: 1.6 MG/DL (ref 0.8–1.3)
GFR AFRICAN AMERICAN: 49
GFR NON-AFRICAN AMERICAN: 41
GLUCOSE BLD-MCNC: 86 MG/DL (ref 70–99)
HCT VFR BLD CALC: 33.9 % (ref 40.5–52.5)
HEMOGLOBIN: 11.5 G/DL (ref 13.5–17.5)
MAGNESIUM: 2.2 MG/DL (ref 1.8–2.4)
MCH RBC QN AUTO: 32.1 PG (ref 26–34)
MCHC RBC AUTO-ENTMCNC: 33.9 G/DL (ref 31–36)
MCV RBC AUTO: 94.8 FL (ref 80–100)
PDW BLD-RTO: 13.8 % (ref 12.4–15.4)
PLATELET # BLD: 195 K/UL (ref 135–450)
PMV BLD AUTO: 6.9 FL (ref 5–10.5)
POTASSIUM REFLEX MAGNESIUM: 4.7 MMOL/L (ref 3.5–5.1)
RBC # BLD: 3.58 M/UL (ref 4.2–5.9)
SODIUM BLD-SCNC: 138 MMOL/L (ref 136–145)
WBC # BLD: 6.1 K/UL (ref 4–11)

## 2019-07-26 PROCEDURE — 2580000003 HC RX 258: Performed by: FAMILY MEDICINE

## 2019-07-26 PROCEDURE — G0378 HOSPITAL OBSERVATION PER HR: HCPCS

## 2019-07-26 PROCEDURE — 80048 BASIC METABOLIC PNL TOTAL CA: CPT

## 2019-07-26 PROCEDURE — 85027 COMPLETE CBC AUTOMATED: CPT

## 2019-07-26 PROCEDURE — 83735 ASSAY OF MAGNESIUM: CPT

## 2019-07-26 PROCEDURE — 36415 COLL VENOUS BLD VENIPUNCTURE: CPT

## 2019-07-26 PROCEDURE — 6370000000 HC RX 637 (ALT 250 FOR IP): Performed by: FAMILY MEDICINE

## 2019-07-26 RX ORDER — HYDRALAZINE HYDROCHLORIDE 20 MG/ML
10 INJECTION INTRAMUSCULAR; INTRAVENOUS EVERY 6 HOURS PRN
Status: DISCONTINUED | OUTPATIENT
Start: 2019-07-26 | End: 2019-07-26 | Stop reason: HOSPADM

## 2019-07-26 RX ADMIN — GEMFIBROZIL 600 MG: 600 TABLET ORAL at 06:34

## 2019-07-26 RX ADMIN — FUROSEMIDE 40 MG: 40 TABLET ORAL at 08:11

## 2019-07-26 RX ADMIN — DOCUSATE SODIUM 100 MG: 100 CAPSULE, LIQUID FILLED ORAL at 08:11

## 2019-07-26 RX ADMIN — HYDRALAZINE HYDROCHLORIDE 100 MG: 50 TABLET, FILM COATED ORAL at 08:10

## 2019-07-26 RX ADMIN — ASPIRIN 81 MG 81 MG: 81 TABLET ORAL at 08:11

## 2019-07-26 RX ADMIN — ISOSORBIDE MONONITRATE 60 MG: 60 TABLET, EXTENDED RELEASE ORAL at 08:11

## 2019-07-26 RX ADMIN — LISINOPRIL 10 MG: 10 TABLET ORAL at 08:10

## 2019-07-26 RX ADMIN — SERTRALINE HYDROCHLORIDE 50 MG: 50 TABLET ORAL at 08:11

## 2019-07-26 RX ADMIN — GABAPENTIN 300 MG: 300 CAPSULE ORAL at 08:11

## 2019-07-26 RX ADMIN — OYSTER SHELL CALCIUM WITH VITAMIN D 1 TABLET: 500; 200 TABLET, FILM COATED ORAL at 08:10

## 2019-07-26 RX ADMIN — Medication 10 ML: at 08:23

## 2019-07-26 NOTE — PLAN OF CARE
Problem: Falls - Risk of:  Goal: Will remain free from falls  Description  Will remain free from falls  7/26/2019 0018 by Samreen Hansen RN  Outcome: Ongoing  Note:   Pt remains free from falls this shift.   7/25/2019 1809 by Karly Puri RN  Outcome: Ongoing  Goal: Absence of physical injury  Description  Absence of physical injury  7/26/2019 0018 by Samreen Hansen RN  Outcome: Ongoing  7/25/2019 1809 by Karly Puri RN  Outcome: Ongoing     Problem: Safety:  Goal: Free from accidental physical injury  Description  Free from accidental physical injury  7/26/2019 0018 by Samreen Hansen RN  Outcome: Ongoing  7/25/2019 1809 by Karly Puri RN  Outcome: Ongoing  Goal: Free from intentional harm  Description  Free from intentional harm  7/26/2019 0018 by Samreen Hansen RN  Outcome: Ongoing  7/25/2019 1809 by Karly Puri RN  Outcome: Ongoing     Problem: Pain:  Goal: Patient's pain/discomfort is manageable  Description  Patient's pain/discomfort is manageable  7/26/2019 0018 by Samreen Hansen RN  Outcome: Ongoing  7/25/2019 1809 by Karly Puri RN  Outcome: Ongoing  Goal: Pain level will decrease  Description  Pain level will decrease  Outcome: Ongoing  Goal: Control of acute pain  Description  Control of acute pain  Outcome: Ongoing  Goal: Control of chronic pain  Description  Control of chronic pain  Outcome: Ongoing     Problem: Skin Integrity:  Goal: Skin integrity will stabilize  Description  Skin integrity will stabilize  7/26/2019 0018 by Samreen Hansen RN  Outcome: Ongoing  7/25/2019 1809 by Karly Puri RN  Outcome: Ongoing     Problem: Discharge Planning:  Goal: Patients continuum of care needs are met  Description  Patients continuum of care needs are met  7/26/2019 0018 by Samreen Hansen RN  Outcome: Ongoing  7/25/2019 1809 by Karly Puri RN  Outcome: Ongoing

## 2019-07-26 NOTE — DISCHARGE SUMMARY
NEW DOSAGE  Associated Diagnoses: Essential hypertension      Omega-3 Fatty Acids (FISH OIL) 1000 MG CAPS Take 1,000 mg by mouth nightly       sertraline (ZOLOFT) 50 MG tablet Take 1 tablet by mouth daily  Qty: 90 tablet, Refills: 0      rOPINIRole (REQUIP) 2 MG tablet Take 1 tablet by mouth nightly  Qty: 90 tablet, Refills: 0      isosorbide mononitrate (IMDUR) 60 MG extended release tablet Take 1 tablet by mouth daily Dr. Natasha Naik - Cardio  Qty: 90 tablet, Refills: 0      aspirin (ASPIRIN CHILDRENS) 81 MG chewable tablet Take 1 tablet by mouth daily  Qty: 30 tablet, Refills: 3      Multiple Vitamins-Minerals (THERAPEUTIC MULTIVITAMIN-MINERALS) tablet Take 1 tablet by mouth daily  Qty: 30 tablet, Refills: 11      furosemide (LASIX) 20 MG tablet Take 2-3 tablets by mouth daily Take 40 mg daily. Increase to 60 mg if weight over 164 lbs.   Qty: 60 tablet, Refills: 5    Comments: NEW DIRECTIONS  Associated Diagnoses: Cor pulmonale, chronic (HCC)      calcium citrate-vitamin D (CITRACAL MAXIMUM) 315-250 MG-UNIT TABS per tablet Take 2 tablets by mouth 2 times daily (with meals)  Qty: 120 tablet, Refills: 1      docusate sodium (COLACE) 100 MG capsule Take 1 capsule by mouth 2 times daily  Qty: 60 capsule, Refills: 5      gabapentin (NEURONTIN) 300 MG capsule Take 1 capsule by mouth 2 times daily  Qty: 180 capsule, Refills: 1      hydrALAZINE (APRESOLINE) 100 MG tablet Take 1 tablet by mouth 3 times daily  Qty: 90 tablet, Refills: 1    Comments: New dosage      acetaminophen (TYLENOL) 325 MG tablet Take 650 mg by mouth every 6 hours as needed for Pain      Incontinence Supply Disposable (INCONTINENCE BRIEF MEDIUM) MISC three times per day as needed  Qty: 100 each, Refills: 11    Associated Diagnoses: Other urinary incontinence      gemfibrozil (LOPID) 600 MG tablet Take 1 tablet by mouth 2 times daily (before meals)  Qty: 180 tablet, Refills: 3    Comments: Replacing tricor           Current Discharge Medication List Follow-up appointments:  one week    Provider Follow-up:    pcp    Condition at Discharge:  Stable    The patient was seen and examined on day of discharge and this discharge summary is in conjunction with any daily progress note from day of discharge. Time Spent on discharge is 45 minutes  in the examination, evaluation, counseling and review of medications and discharge plan. Signed:    Kassidy Molina DO   7/26/2019      Thank you Oscar Vanessa MD for the opportunity to be involved in this patient's care. If you have any questions or concerns please feel free to contact me at [phone].

## 2019-07-26 NOTE — DISCHARGE INSTR - COC
13-valent Alonzo Andres) 11/27/2015    Pneumococcal Conjugate 7-valent (Prevnar7) 11/21/2005    Pneumococcal Polysaccharide (Fcylotjdr78) 11/21/2005    Tdap (Boostrix, Adacel) 05/15/2007       Active Problems:  Patient Active Problem List   Diagnosis Code    COPD (chronic obstructive pulmonary disease) ([facility] J44.9    Essential hypertension I10    Coronary artery disease involving native coronary artery of native heart without angina pectoris I25.10    Eczema L30.9    Dizziness R42    Central hearing loss H90.5    Anxiety F41.9    Recurrent major depressive disorder, in full remission ([facility] F33.42    Vertigo R42    Hypertrophy of prostate without urinary obstruction and other lower urinary tract symptoms (LUTS) N40.0    Allergic rhinitis J30.9    Osteoporosis DX -2.4 (7/12) M81.0    History of prostate cancer 2001 TURP, seeds Z85.46    Colon polyp- last colon 5/22/09 K63.5    Osteoarthritis, generalized M15.9    Hyperlipidemia LDL goal <130 due to age E70.9    History of TIAs Z86.73    Dry eye H04.129    Needs flu shot Z23    RLS (restless legs syndrome) G25.81    Hyperglycemia R73.9    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    Cor pulmonale, chronic (HCC) I27.81    Pulmonary hypertension (HCC) I27.20    Atrial fibrillation with slow ventricular response (HCC) I48.91    S/P CABG (coronary artery bypass graft) Z95.1    Bradycardia R00.1    Urinary retention R33.9    Stage 3 chronic kidney disease (HCC) N18.3    SSS (sick sinus syndrome) ([facility]- declines pacemanker I49.5       Isolation/Infection:   Isolation          No Isolation            Nurse Assessment:  Last Vital Signs: BP (!) 205/70 Comment: manual  Pulse (!) 36   Temp 97.5 °F (36.4 °C)   Resp 14   Ht 6' (1.829 m)   Wt 152 lb 5.4 oz (69.1 kg)   SpO2 93%   BMI 20.66 kg/m²     Last documented pain score (0-10 scale): Pain Level: 0  Last Weight:   Wt Readings from Last 1 Encounters:   07/26/19 152 lb 5.4 oz MANAGEMENT/SOCIAL WORK SECTION    Inpatient Status Date: ***    Readmission Risk Assessment Score:  Readmission Risk              Risk of Unplanned Readmission:        16           Discharging to Facility/ Agency   · Name:   · Address:  · Phone:  · Fax:    Dialysis Facility (if applicable)   · Name:  · Address:  · Dialysis Schedule:  · Phone:  · Fax:    / signature: {Esignature:[id number]}    PHYSICIAN SECTION    Prognosis: {Prognosis:[id number]}    Condition at Discharge: Stable    Rehab Potential (if transferring to Rehab): Good    Recommended Labs or Other Treatments After Discharge:     Physician Certification: I certify the above information and transfer of Leeroy Schmitt  is necessary for the continuing treatment of the diagnosis listed and that he requires Home Care for greater 30 days.      Update Admission H&P: No change in H&P    PHYSICIAN SIGNATURE:  Electronically signed by Parag Silva DO on 7/26/19 at 10:56 AM

## 2019-07-29 ENCOUNTER — TELEPHONE (OUTPATIENT)
Dept: FAMILY MEDICINE CLINIC | Age: 84
End: 2019-07-29

## 2019-08-29 DIAGNOSIS — I27.81 COR PULMONALE, CHRONIC (HCC): ICD-10-CM

## 2019-08-29 RX ORDER — ASPIRIN 81 MG/1
81 TABLET, CHEWABLE ORAL DAILY
Qty: 30 TABLET | Refills: 11 | Status: SHIPPED | OUTPATIENT
Start: 2019-08-29

## 2019-08-30 RX ORDER — FUROSEMIDE 20 MG/1
40-60 TABLET ORAL DAILY
Qty: 60 TABLET | Refills: 5 | Status: SHIPPED | OUTPATIENT
Start: 2019-08-30 | End: 2019-11-23 | Stop reason: DRUGHIGH

## 2019-09-16 ENCOUNTER — OFFICE VISIT (OUTPATIENT)
Dept: FAMILY MEDICINE CLINIC | Age: 84
End: 2019-09-16
Payer: MEDICARE

## 2019-09-16 VITALS
HEIGHT: 72 IN | SYSTOLIC BLOOD PRESSURE: 122 MMHG | WEIGHT: 155 LBS | HEART RATE: 37 BPM | RESPIRATION RATE: 16 BRPM | DIASTOLIC BLOOD PRESSURE: 62 MMHG | BODY MASS INDEX: 20.99 KG/M2

## 2019-09-16 DIAGNOSIS — I27.81 COR PULMONALE, CHRONIC (HCC): ICD-10-CM

## 2019-09-16 DIAGNOSIS — F03.90 DEMENTIA WITHOUT BEHAVIORAL DISTURBANCE, UNSPECIFIED DEMENTIA TYPE: ICD-10-CM

## 2019-09-16 DIAGNOSIS — I10 ESSENTIAL HYPERTENSION: ICD-10-CM

## 2019-09-16 DIAGNOSIS — N18.30 STAGE 3 CHRONIC KIDNEY DISEASE (HCC): ICD-10-CM

## 2019-09-16 DIAGNOSIS — G25.81 RLS (RESTLESS LEGS SYNDROME): ICD-10-CM

## 2019-09-16 DIAGNOSIS — I25.10 CORONARY ARTERY DISEASE INVOLVING NATIVE CORONARY ARTERY OF NATIVE HEART WITHOUT ANGINA PECTORIS: ICD-10-CM

## 2019-09-16 DIAGNOSIS — Z91.81 AT HIGH RISK FOR FALLS: Primary | ICD-10-CM

## 2019-09-16 DIAGNOSIS — I48.91 ATRIAL FIBRILLATION WITH SLOW VENTRICULAR RESPONSE (HCC): ICD-10-CM

## 2019-09-16 DIAGNOSIS — F41.9 ANXIETY: ICD-10-CM

## 2019-09-16 DIAGNOSIS — K59.00 CONSTIPATION, UNSPECIFIED CONSTIPATION TYPE: ICD-10-CM

## 2019-09-16 PROCEDURE — G8598 ASA/ANTIPLAT THER USED: HCPCS | Performed by: FAMILY MEDICINE

## 2019-09-16 PROCEDURE — G8427 DOCREV CUR MEDS BY ELIG CLIN: HCPCS | Performed by: FAMILY MEDICINE

## 2019-09-16 PROCEDURE — G8420 CALC BMI NORM PARAMETERS: HCPCS | Performed by: FAMILY MEDICINE

## 2019-09-16 PROCEDURE — 1123F ACP DISCUSS/DSCN MKR DOCD: CPT | Performed by: FAMILY MEDICINE

## 2019-09-16 PROCEDURE — 4040F PNEUMOC VAC/ADMIN/RCVD: CPT | Performed by: FAMILY MEDICINE

## 2019-09-16 PROCEDURE — 1036F TOBACCO NON-USER: CPT | Performed by: FAMILY MEDICINE

## 2019-09-16 PROCEDURE — 99214 OFFICE O/P EST MOD 30 MIN: CPT | Performed by: FAMILY MEDICINE

## 2019-09-16 RX ORDER — DONEPEZIL HYDROCHLORIDE 5 MG/1
5 TABLET, FILM COATED ORAL NIGHTLY
Qty: 30 TABLET | Refills: 0 | Status: SHIPPED | OUTPATIENT
Start: 2019-09-16 | End: 2019-10-21

## 2019-09-16 RX ORDER — DONEPEZIL HYDROCHLORIDE 10 MG/1
10 TABLET, FILM COATED ORAL NIGHTLY
Qty: 90 TABLET | Refills: 1 | Status: SHIPPED | OUTPATIENT
Start: 2019-09-16 | End: 2019-09-16 | Stop reason: SDUPTHER

## 2019-09-16 RX ORDER — DONEPEZIL HYDROCHLORIDE 10 MG/1
10 TABLET, FILM COATED ORAL NIGHTLY
Qty: 90 TABLET | Refills: 1 | Status: SHIPPED | OUTPATIENT
Start: 2019-09-16 | End: 2019-10-21 | Stop reason: SDUPTHER

## 2019-09-16 RX ORDER — DONEPEZIL HYDROCHLORIDE 5 MG/1
5 TABLET, FILM COATED ORAL NIGHTLY
Qty: 30 TABLET | Refills: 5 | Status: SHIPPED | OUTPATIENT
Start: 2019-09-16 | End: 2019-09-16 | Stop reason: SDUPTHER

## 2019-09-16 RX ORDER — ISOSORBIDE MONONITRATE 60 MG/1
60 TABLET, EXTENDED RELEASE ORAL DAILY
Qty: 90 TABLET | Refills: 0 | Status: SHIPPED | OUTPATIENT
Start: 2019-09-16 | End: 2020-04-20 | Stop reason: SDUPTHER

## 2019-09-16 RX ORDER — ROPINIROLE 2 MG/1
2 TABLET, FILM COATED ORAL NIGHTLY
Qty: 90 TABLET | Refills: 0 | Status: SHIPPED | OUTPATIENT
Start: 2019-09-16 | End: 2019-11-23

## 2019-09-16 NOTE — PROGRESS NOTES
On the basis of positive falls risk screening, assessment and plan is as follows: SEE OTHER NOTE. uSING WALKER

## 2019-09-16 NOTE — PROGRESS NOTES
daily Yes Nadya Boyd MD   hydrALAZINE (APRESOLINE) 100 MG tablet Take 1 tablet by mouth 3 times daily Yes Eve Horton MD   acetaminophen (TYLENOL) 325 MG tablet Take 650 mg by mouth every 6 hours as needed for Pain Yes Historical Provider, MD   Incontinence Supply Disposable (INCONTINENCE BRIEF MEDIUM) MISC three times per day as needed Yes Nadya Boyd MD   gemfibrozil (LOPID) 600 MG tablet Take 1 tablet by mouth 2 times daily (before meals) Yes Nadya Boyd MD      Family History   Problem Relation Age of Onset    Dementia Brother     Cancer Son      Social History     Tobacco Use    Smoking status: Former Smoker     Last attempt to quit: 1960     Years since quittin.7    Smokeless tobacco: Never Used    Tobacco comment: Advised not to resume   Substance Use Topics    Alcohol use: No    Drug use: No      LAST LABS  Cholesterol, Total   Date Value Ref Range Status   2019 131 0 - 199 mg/dL Final     LDL Calculated   Date Value Ref Range Status   2019 80 <100 mg/dL Final     HDL   Date Value Ref Range Status   2019 43 40 - 60 mg/dL Final     Triglycerides   Date Value Ref Range Status   2019 42 0 - 150 mg/dL Final     Lab Results   Component Value Date    GLUCOSE 86 2019     Lab Results   Component Value Date     2019    K 4.7 2019    CREATININE 1.6 (H) 2019     Lab Results   Component Value Date    WBC 6.1 2019    HGB 11.5 (L) 2019    HCT 33.9 (L) 2019    MCV 94.8 2019     2019     Lab Results   Component Value Date    ALT 8 (L) 2019    AST 15 2019    ALKPHOS 80 2019    BILITOT 0.3 2019     TSH (uIU/mL)   Date Value   2015 2.12     Lab Results   Component Value Date    LABA1C 6.0 2019        Objective:   PHYSICAL EXAM   /62 (Site: Right Upper Arm, Position: Sitting, Cuff Size: Medium Adult)   Pulse (!) 37   Resp 16   Ht 6' (1.829 m)   Wt 155 lb (70.3 kg)   BMI 21.02 kg/m²   BP Readings from Last 5 Encounters:   09/16/19 122/62   07/26/19 (!) 166/56   07/18/19 (!) 164/50   06/18/19 (!) 156/50   03/13/19 138/70     Wt Readings from Last 5 Encounters:   09/16/19 155 lb (70.3 kg)   07/26/19 152 lb 5.4 oz (69.1 kg)   07/18/19 157 lb (71.2 kg)   06/18/19 162 lb (73.5 kg)   03/13/19 157 lb (71.2 kg)      GENERAL:   · well-developed, well-nourished, alert, no distress. EYES:   · External findings: lids and lashes normal and conjunctivae and sclerae normal  LUNGS:    · Breathing unlabored  · clear to auscultation bilaterally and good air movement  CARDIOVASC:   · regular rate and rhythm, S1, S2 normal. No murmur, click, rub or gallop  · LEGS:  Lower extremity edema: none    SKIN: warm and dry  PSYCH:    · Alert   · DECREASED MEMORY  · Facial expressions full, mood appropriate  · No memory disturbance noted    Mini mental exam performed. Score 23/30. See scanned form. Assessment and Plan:      Diagnosis Orders   1. At high risk for falls     2. Essential hypertension     3. Coronary artery disease involving native coronary artery of native heart without angina pectoris  isosorbide mononitrate (IMDUR) 60 MG extended release tablet   4. Cor pulmonale, chronic ([facility]     5. Atrial fibrillation with slow ventricular response (HCC)     6. Stage 3 chronic kidney disease ([facility]     7. Constipation, unspecified constipation type     8. Anxiety  sertraline (ZOLOFT) 50 MG tablet   9. RLS (restless legs syndrome)  rOPINIRole (REQUIP) 2 MG tablet   10. Dementia without behavioral disturbance, unspecified dementia type  donepezil (ARICEPT) 5 MG tablet    donepezil (ARICEPT) 10 MG tablet   Stable. Continue current Tx plan except for changes marked below. INSTRUCTIONS  NEXT APPOINTMENT: Please schedule fasting annual physical (30 minutes) in 6 months. OK to have water, black coffee and medications (except for diabetes medicines).     · PLEASE TAKE THIS FORM TO CHECK-OUT WINDOW TO SCHEDULE NEXT VISIT. · Please get flu vaccine when available in fall. Can get either at this office or at stores such as [employer].   · Family to check on docusate/colace scheduled twice a day scheduled AND the miralax powder daily as needed   · For memory, start aricept 5 mg at bedtime for a month, then increase to 10 mg at bedtime

## 2019-10-18 DIAGNOSIS — F03.90 DEMENTIA WITHOUT BEHAVIORAL DISTURBANCE, UNSPECIFIED DEMENTIA TYPE: ICD-10-CM

## 2019-10-21 RX ORDER — DONEPEZIL HYDROCHLORIDE 10 MG/1
10 TABLET, FILM COATED ORAL NIGHTLY
Qty: 90 TABLET | Refills: 1 | Status: ON HOLD | OUTPATIENT
Start: 2019-10-21 | End: 2021-06-27

## 2019-10-21 RX ORDER — DONEPEZIL HYDROCHLORIDE 5 MG/1
5 TABLET, FILM COATED ORAL NIGHTLY
Qty: 30 TABLET | Refills: 2 | Status: CANCELLED | OUTPATIENT
Start: 2019-10-21

## 2019-11-06 ENCOUNTER — TELEPHONE (OUTPATIENT)
Dept: FAMILY MEDICINE CLINIC | Age: 84
End: 2019-11-06

## 2019-11-23 DIAGNOSIS — I27.81 COR PULMONALE, CHRONIC (HCC): ICD-10-CM

## 2019-11-23 RX ORDER — FUROSEMIDE 40 MG/1
40 TABLET ORAL DAILY
Status: SHIPPED | COMMUNITY
Start: 2019-11-23 | End: 2020-04-20 | Stop reason: SDUPTHER

## 2019-11-26 ENCOUNTER — TELEPHONE (OUTPATIENT)
Dept: FAMILY MEDICINE CLINIC | Age: 84
End: 2019-11-26

## 2019-11-26 DIAGNOSIS — R53.83 OTHER FATIGUE: ICD-10-CM

## 2019-11-26 DIAGNOSIS — R41.0 CONFUSION: Primary | ICD-10-CM

## 2019-11-29 ENCOUNTER — TELEPHONE (OUTPATIENT)
Dept: FAMILY MEDICINE CLINIC | Age: 84
End: 2019-11-29

## 2019-12-02 ENCOUNTER — TELEPHONE (OUTPATIENT)
Dept: CARDIOLOGY CLINIC | Age: 84
End: 2019-12-02

## 2019-12-05 ENCOUNTER — TELEPHONE (OUTPATIENT)
Dept: CARDIOLOGY CLINIC | Age: 84
End: 2019-12-05

## 2019-12-10 ENCOUNTER — OFFICE VISIT (OUTPATIENT)
Dept: CARDIOLOGY CLINIC | Age: 84
End: 2019-12-10
Payer: MEDICARE

## 2019-12-10 VITALS
HEIGHT: 72 IN | DIASTOLIC BLOOD PRESSURE: 40 MMHG | HEART RATE: 41 BPM | BODY MASS INDEX: 20.45 KG/M2 | SYSTOLIC BLOOD PRESSURE: 172 MMHG | WEIGHT: 151 LBS | OXYGEN SATURATION: 98 %

## 2019-12-10 DIAGNOSIS — I27.81 COR PULMONALE, CHRONIC (HCC): ICD-10-CM

## 2019-12-10 DIAGNOSIS — I25.10 CORONARY ARTERY DISEASE INVOLVING NATIVE CORONARY ARTERY OF NATIVE HEART WITHOUT ANGINA PECTORIS: ICD-10-CM

## 2019-12-10 DIAGNOSIS — Z95.1 S/P CABG (CORONARY ARTERY BYPASS GRAFT): ICD-10-CM

## 2019-12-10 DIAGNOSIS — I50.812 CHRONIC RIGHT-SIDED HEART FAILURE (HCC): ICD-10-CM

## 2019-12-10 DIAGNOSIS — I49.5 SSS (SICK SINUS SYNDROME) (HCC): Primary | ICD-10-CM

## 2019-12-10 DIAGNOSIS — I10 ESSENTIAL HYPERTENSION: ICD-10-CM

## 2019-12-10 DIAGNOSIS — N18.30 STAGE 3 CHRONIC KIDNEY DISEASE (HCC): ICD-10-CM

## 2019-12-10 DIAGNOSIS — I27.20 PULMONARY HTN (HCC): ICD-10-CM

## 2019-12-10 DIAGNOSIS — I48.91 ATRIAL FIBRILLATION WITH SLOW VENTRICULAR RESPONSE (HCC): ICD-10-CM

## 2019-12-10 PROCEDURE — 1036F TOBACCO NON-USER: CPT | Performed by: INTERNAL MEDICINE

## 2019-12-10 PROCEDURE — 4040F PNEUMOC VAC/ADMIN/RCVD: CPT | Performed by: INTERNAL MEDICINE

## 2019-12-10 PROCEDURE — G8420 CALC BMI NORM PARAMETERS: HCPCS | Performed by: INTERNAL MEDICINE

## 2019-12-10 PROCEDURE — G8427 DOCREV CUR MEDS BY ELIG CLIN: HCPCS | Performed by: INTERNAL MEDICINE

## 2019-12-10 PROCEDURE — 93000 ELECTROCARDIOGRAM COMPLETE: CPT | Performed by: INTERNAL MEDICINE

## 2019-12-10 PROCEDURE — 99214 OFFICE O/P EST MOD 30 MIN: CPT | Performed by: INTERNAL MEDICINE

## 2019-12-10 PROCEDURE — G8484 FLU IMMUNIZE NO ADMIN: HCPCS | Performed by: INTERNAL MEDICINE

## 2019-12-10 PROCEDURE — 1123F ACP DISCUSS/DSCN MKR DOCD: CPT | Performed by: INTERNAL MEDICINE

## 2019-12-10 PROCEDURE — G8598 ASA/ANTIPLAT THER USED: HCPCS | Performed by: INTERNAL MEDICINE

## 2019-12-10 RX ORDER — GEMFIBROZIL 600 MG/1
600 TABLET, FILM COATED ORAL
COMMUNITY
End: 2020-04-14 | Stop reason: SDUPTHER

## 2019-12-22 ENCOUNTER — APPOINTMENT (OUTPATIENT)
Dept: CT IMAGING | Age: 84
DRG: 543 | End: 2019-12-22
Payer: MEDICARE

## 2019-12-22 ENCOUNTER — HOSPITAL ENCOUNTER (INPATIENT)
Age: 84
LOS: 5 days | Discharge: NURSING, REHAB OR LONG-TERM CARE | DRG: 543 | End: 2019-12-27
Attending: EMERGENCY MEDICINE | Admitting: INTERNAL MEDICINE
Payer: MEDICARE

## 2019-12-22 DIAGNOSIS — M54.50 ACUTE LOW BACK PAIN, UNSPECIFIED BACK PAIN LATERALITY, UNSPECIFIED WHETHER SCIATICA PRESENT: Primary | ICD-10-CM

## 2019-12-22 PROBLEM — M54.9 BACK PAIN: Status: ACTIVE | Noted: 2019-12-22

## 2019-12-22 LAB
A/G RATIO: 1.4 (ref 1.1–2.2)
ALBUMIN SERPL-MCNC: 3.6 G/DL (ref 3.4–5)
ALP BLD-CCNC: 70 U/L (ref 40–129)
ALT SERPL-CCNC: 7 U/L (ref 10–40)
ANION GAP SERPL CALCULATED.3IONS-SCNC: 18 MMOL/L (ref 3–16)
AST SERPL-CCNC: 12 U/L (ref 15–37)
BASOPHILS ABSOLUTE: 0 K/UL (ref 0–0.2)
BASOPHILS RELATIVE PERCENT: 0.4 %
BILIRUB SERPL-MCNC: 0.4 MG/DL (ref 0–1)
BILIRUBIN URINE: NEGATIVE
BLOOD, URINE: NEGATIVE
BUN BLDV-MCNC: 35 MG/DL (ref 7–20)
CALCIUM SERPL-MCNC: 8.9 MG/DL (ref 8.3–10.6)
CHLORIDE BLD-SCNC: 102 MMOL/L (ref 99–110)
CLARITY: CLEAR
CO2: 21 MMOL/L (ref 21–32)
COLOR: YELLOW
CREAT SERPL-MCNC: 1.7 MG/DL (ref 0.8–1.3)
EOSINOPHILS ABSOLUTE: 0.2 K/UL (ref 0–0.6)
EOSINOPHILS RELATIVE PERCENT: 2.5 %
EPITHELIAL CELLS, UA: 1 /HPF (ref 0–5)
GFR AFRICAN AMERICAN: 46
GFR NON-AFRICAN AMERICAN: 38
GLOBULIN: 2.6 G/DL
GLUCOSE BLD-MCNC: 89 MG/DL (ref 70–99)
GLUCOSE URINE: NEGATIVE MG/DL
HCT VFR BLD CALC: 29.4 % (ref 40.5–52.5)
HEMOGLOBIN: 10.1 G/DL (ref 13.5–17.5)
HYALINE CASTS: 2 /LPF (ref 0–8)
HYALINE CASTS: 4 /LPF (ref 0–2)
KETONES, URINE: NEGATIVE MG/DL
LEUKOCYTE ESTERASE, URINE: NEGATIVE
LEUKOCYTES, UA: 2 /HPF (ref 0–5)
LYMPHOCYTES ABSOLUTE: 1.8 K/UL (ref 1–5.1)
LYMPHOCYTES RELATIVE PERCENT: 29.3 %
MCH RBC QN AUTO: 32.7 PG (ref 26–34)
MCHC RBC AUTO-ENTMCNC: 34.3 G/DL (ref 31–36)
MCV RBC AUTO: 95.3 FL (ref 80–100)
MICROSCOPIC EXAMINATION: YES
MONOCYTES ABSOLUTE: 0.5 K/UL (ref 0–1.3)
MONOCYTES RELATIVE PERCENT: 8.6 %
NEUTROPHILS ABSOLUTE: 3.7 K/UL (ref 1.7–7.7)
NEUTROPHILS RELATIVE PERCENT: 59.2 %
NITRITE, URINE: NEGATIVE
PDW BLD-RTO: 14.2 % (ref 12.4–15.4)
PH UA: 6 (ref 5–8)
PLATELET # BLD: 157 K/UL (ref 135–450)
PMV BLD AUTO: 7.4 FL (ref 5–10.5)
POTASSIUM SERPL-SCNC: 3.7 MMOL/L (ref 3.5–5.1)
PROTEIN UA: 30 MG/DL
RBC # BLD: 3.08 M/UL (ref 4.2–5.9)
RBC UA: 1 /HPF (ref 0–4)
RBC UA: <1 /HPF (ref 0–3)
SODIUM BLD-SCNC: 141 MMOL/L (ref 136–145)
SPECIFIC GRAVITY UA: 1.01 (ref 1–1.03)
TOTAL PROTEIN: 6.2 G/DL (ref 6.4–8.2)
URINE REFLEX TO CULTURE: ABNORMAL
URINE TYPE: ABNORMAL
UROBILINOGEN, URINE: 1 E.U./DL
WBC # BLD: 6.3 K/UL (ref 4–11)
WBC UA: 2 /HPF (ref 0–5)

## 2019-12-22 PROCEDURE — 80053 COMPREHEN METABOLIC PANEL: CPT

## 2019-12-22 PROCEDURE — 6360000002 HC RX W HCPCS: Performed by: EMERGENCY MEDICINE

## 2019-12-22 PROCEDURE — 96374 THER/PROPH/DIAG INJ IV PUSH: CPT

## 2019-12-22 PROCEDURE — 6370000000 HC RX 637 (ALT 250 FOR IP): Performed by: INTERNAL MEDICINE

## 2019-12-22 PROCEDURE — 6360000002 HC RX W HCPCS: Performed by: INTERNAL MEDICINE

## 2019-12-22 PROCEDURE — 6370000000 HC RX 637 (ALT 250 FOR IP): Performed by: EMERGENCY MEDICINE

## 2019-12-22 PROCEDURE — 85025 COMPLETE CBC W/AUTO DIFF WBC: CPT

## 2019-12-22 PROCEDURE — G0378 HOSPITAL OBSERVATION PER HR: HCPCS

## 2019-12-22 PROCEDURE — 2580000003 HC RX 258: Performed by: INTERNAL MEDICINE

## 2019-12-22 PROCEDURE — 74176 CT ABD & PELVIS W/O CONTRAST: CPT

## 2019-12-22 PROCEDURE — 99285 EMERGENCY DEPT VISIT HI MDM: CPT

## 2019-12-22 PROCEDURE — 81001 URINALYSIS AUTO W/SCOPE: CPT

## 2019-12-22 PROCEDURE — 1200000000 HC SEMI PRIVATE

## 2019-12-22 PROCEDURE — 72131 CT LUMBAR SPINE W/O DYE: CPT

## 2019-12-22 PROCEDURE — 72128 CT CHEST SPINE W/O DYE: CPT

## 2019-12-22 RX ORDER — FUROSEMIDE 40 MG/1
40 TABLET ORAL DAILY
Status: DISCONTINUED | OUTPATIENT
Start: 2019-12-22 | End: 2019-12-27 | Stop reason: HOSPADM

## 2019-12-22 RX ORDER — TRAMADOL HYDROCHLORIDE 50 MG/1
50 TABLET ORAL EVERY 6 HOURS PRN
Status: DISCONTINUED | OUTPATIENT
Start: 2019-12-22 | End: 2019-12-22 | Stop reason: DRUGHIGH

## 2019-12-22 RX ORDER — TIZANIDINE 4 MG/1
2 TABLET ORAL ONCE
Status: COMPLETED | OUTPATIENT
Start: 2019-12-22 | End: 2019-12-22

## 2019-12-22 RX ORDER — ACETAMINOPHEN 325 MG/1
650 TABLET ORAL EVERY 6 HOURS PRN
Status: DISCONTINUED | OUTPATIENT
Start: 2019-12-22 | End: 2019-12-27 | Stop reason: HOSPADM

## 2019-12-22 RX ORDER — GEMFIBROZIL 600 MG/1
600 TABLET, FILM COATED ORAL
Status: DISCONTINUED | OUTPATIENT
Start: 2019-12-22 | End: 2019-12-27 | Stop reason: HOSPADM

## 2019-12-22 RX ORDER — HYDRALAZINE HYDROCHLORIDE 50 MG/1
100 TABLET, FILM COATED ORAL 3 TIMES DAILY
Status: DISCONTINUED | OUTPATIENT
Start: 2019-12-22 | End: 2019-12-27 | Stop reason: HOSPADM

## 2019-12-22 RX ORDER — SODIUM CHLORIDE 0.9 % (FLUSH) 0.9 %
10 SYRINGE (ML) INJECTION EVERY 12 HOURS SCHEDULED
Status: DISCONTINUED | OUTPATIENT
Start: 2019-12-22 | End: 2019-12-27 | Stop reason: HOSPADM

## 2019-12-22 RX ORDER — DOCUSATE SODIUM 100 MG/1
100 CAPSULE, LIQUID FILLED ORAL 2 TIMES DAILY
Status: DISCONTINUED | OUTPATIENT
Start: 2019-12-22 | End: 2019-12-27 | Stop reason: HOSPADM

## 2019-12-22 RX ORDER — DONEPEZIL HYDROCHLORIDE 10 MG/1
10 TABLET, FILM COATED ORAL NIGHTLY
Status: DISCONTINUED | OUTPATIENT
Start: 2019-12-22 | End: 2019-12-27 | Stop reason: HOSPADM

## 2019-12-22 RX ORDER — FENTANYL CITRATE 50 UG/ML
12.5 INJECTION, SOLUTION INTRAMUSCULAR; INTRAVENOUS ONCE
Status: COMPLETED | OUTPATIENT
Start: 2019-12-22 | End: 2019-12-22

## 2019-12-22 RX ORDER — LISINOPRIL 10 MG/1
10 TABLET ORAL DAILY
Status: DISCONTINUED | OUTPATIENT
Start: 2019-12-22 | End: 2019-12-24

## 2019-12-22 RX ORDER — ONDANSETRON 2 MG/ML
4 INJECTION INTRAMUSCULAR; INTRAVENOUS EVERY 6 HOURS PRN
Status: DISCONTINUED | OUTPATIENT
Start: 2019-12-22 | End: 2019-12-27 | Stop reason: HOSPADM

## 2019-12-22 RX ORDER — GABAPENTIN 300 MG/1
300 CAPSULE ORAL 2 TIMES DAILY
Status: DISCONTINUED | OUTPATIENT
Start: 2019-12-22 | End: 2019-12-27 | Stop reason: HOSPADM

## 2019-12-22 RX ORDER — HEPARIN SODIUM 5000 [USP'U]/ML
5000 INJECTION, SOLUTION INTRAVENOUS; SUBCUTANEOUS EVERY 8 HOURS SCHEDULED
Status: DISCONTINUED | OUTPATIENT
Start: 2019-12-22 | End: 2019-12-27 | Stop reason: HOSPADM

## 2019-12-22 RX ORDER — MAGNESIUM SULFATE 1 G/100ML
1 INJECTION INTRAVENOUS PRN
Status: DISCONTINUED | OUTPATIENT
Start: 2019-12-22 | End: 2019-12-22

## 2019-12-22 RX ORDER — TIZANIDINE 4 MG/1
2 TABLET ORAL EVERY 6 HOURS PRN
Status: DISCONTINUED | OUTPATIENT
Start: 2019-12-22 | End: 2019-12-27 | Stop reason: HOSPADM

## 2019-12-22 RX ORDER — POTASSIUM CHLORIDE 20 MEQ/1
40 TABLET, EXTENDED RELEASE ORAL PRN
Status: DISCONTINUED | OUTPATIENT
Start: 2019-12-22 | End: 2019-12-22

## 2019-12-22 RX ORDER — TRAMADOL HYDROCHLORIDE 50 MG/1
50 TABLET ORAL EVERY 12 HOURS PRN
Status: DISCONTINUED | OUTPATIENT
Start: 2019-12-22 | End: 2019-12-27 | Stop reason: HOSPADM

## 2019-12-22 RX ORDER — ISOSORBIDE MONONITRATE 60 MG/1
60 TABLET, EXTENDED RELEASE ORAL DAILY
Status: DISCONTINUED | OUTPATIENT
Start: 2019-12-22 | End: 2019-12-27 | Stop reason: HOSPADM

## 2019-12-22 RX ORDER — HYDRALAZINE HYDROCHLORIDE 20 MG/ML
10 INJECTION INTRAMUSCULAR; INTRAVENOUS EVERY 6 HOURS PRN
Status: DISCONTINUED | OUTPATIENT
Start: 2019-12-22 | End: 2019-12-27 | Stop reason: HOSPADM

## 2019-12-22 RX ORDER — SODIUM CHLORIDE 0.9 % (FLUSH) 0.9 %
10 SYRINGE (ML) INJECTION PRN
Status: DISCONTINUED | OUTPATIENT
Start: 2019-12-22 | End: 2019-12-27 | Stop reason: HOSPADM

## 2019-12-22 RX ORDER — TRAMADOL HYDROCHLORIDE 50 MG/1
100 TABLET ORAL EVERY 6 HOURS PRN
Status: DISCONTINUED | OUTPATIENT
Start: 2019-12-22 | End: 2019-12-22 | Stop reason: DRUGHIGH

## 2019-12-22 RX ORDER — MORPHINE SULFATE 4 MG/ML
4 INJECTION, SOLUTION INTRAMUSCULAR; INTRAVENOUS EVERY 4 HOURS PRN
Status: DISCONTINUED | OUTPATIENT
Start: 2019-12-22 | End: 2019-12-22

## 2019-12-22 RX ORDER — ASPIRIN 81 MG/1
81 TABLET, CHEWABLE ORAL DAILY
Status: DISCONTINUED | OUTPATIENT
Start: 2019-12-22 | End: 2019-12-27 | Stop reason: HOSPADM

## 2019-12-22 RX ORDER — TRAMADOL HYDROCHLORIDE 50 MG/1
100 TABLET ORAL EVERY 12 HOURS PRN
Status: DISCONTINUED | OUTPATIENT
Start: 2019-12-22 | End: 2019-12-27 | Stop reason: HOSPADM

## 2019-12-22 RX ORDER — POTASSIUM CHLORIDE 7.45 MG/ML
10 INJECTION INTRAVENOUS PRN
Status: DISCONTINUED | OUTPATIENT
Start: 2019-12-22 | End: 2019-12-22

## 2019-12-22 RX ADMIN — ASPIRIN 81 MG 81 MG: 81 TABLET ORAL at 11:16

## 2019-12-22 RX ADMIN — HYDRALAZINE HYDROCHLORIDE 100 MG: 50 TABLET, FILM COATED ORAL at 11:16

## 2019-12-22 RX ADMIN — GABAPENTIN 300 MG: 300 CAPSULE ORAL at 11:15

## 2019-12-22 RX ADMIN — LISINOPRIL 10 MG: 10 TABLET ORAL at 11:16

## 2019-12-22 RX ADMIN — TIZANIDINE 2 MG: 4 TABLET ORAL at 21:39

## 2019-12-22 RX ADMIN — SERTRALINE HYDROCHLORIDE 50 MG: 50 TABLET ORAL at 11:15

## 2019-12-22 RX ADMIN — TIZANIDINE 2 MG: 4 TABLET ORAL at 04:04

## 2019-12-22 RX ADMIN — TRAMADOL HYDROCHLORIDE 50 MG: 50 TABLET, FILM COATED ORAL at 21:38

## 2019-12-22 RX ADMIN — SODIUM CHLORIDE, PRESERVATIVE FREE 10 ML: 5 INJECTION INTRAVENOUS at 11:16

## 2019-12-22 RX ADMIN — DONEPEZIL HYDROCHLORIDE 10 MG: 10 TABLET, FILM COATED ORAL at 21:40

## 2019-12-22 RX ADMIN — HEPARIN SODIUM 5000 UNITS: 5000 INJECTION INTRAVENOUS; SUBCUTANEOUS at 14:07

## 2019-12-22 RX ADMIN — HEPARIN SODIUM 5000 UNITS: 5000 INJECTION INTRAVENOUS; SUBCUTANEOUS at 21:40

## 2019-12-22 RX ADMIN — GABAPENTIN 300 MG: 300 CAPSULE ORAL at 21:39

## 2019-12-22 RX ADMIN — DOCUSATE SODIUM 100 MG: 100 CAPSULE, LIQUID FILLED ORAL at 21:38

## 2019-12-22 RX ADMIN — SODIUM CHLORIDE, PRESERVATIVE FREE 10 ML: 5 INJECTION INTRAVENOUS at 21:00

## 2019-12-22 RX ADMIN — HYDRALAZINE HYDROCHLORIDE 100 MG: 50 TABLET, FILM COATED ORAL at 21:40

## 2019-12-22 RX ADMIN — FENTANYL CITRATE 12.5 MCG: 50 INJECTION, SOLUTION INTRAMUSCULAR; INTRAVENOUS at 05:13

## 2019-12-22 RX ADMIN — GEMFIBROZIL 600 MG: 600 TABLET ORAL at 17:16

## 2019-12-22 RX ADMIN — ISOSORBIDE MONONITRATE 60 MG: 60 TABLET, EXTENDED RELEASE ORAL at 11:15

## 2019-12-22 RX ADMIN — FUROSEMIDE 40 MG: 40 TABLET ORAL at 11:15

## 2019-12-22 ASSESSMENT — PAIN SCALES - GENERAL
PAINLEVEL_OUTOF10: 5
PAINLEVEL_OUTOF10: 0
PAINLEVEL_OUTOF10: 3
PAINLEVEL_OUTOF10: 8
PAINLEVEL_OUTOF10: 5
PAINLEVEL_OUTOF10: 6
PAINLEVEL_OUTOF10: 2
PAINLEVEL_OUTOF10: 3

## 2019-12-22 ASSESSMENT — PAIN DESCRIPTION - DESCRIPTORS
DESCRIPTORS: ACHING;DISCOMFORT
DESCRIPTORS: ACHING
DESCRIPTORS: ACHING;DISCOMFORT

## 2019-12-22 ASSESSMENT — PAIN DESCRIPTION - PAIN TYPE
TYPE: ACUTE PAIN
TYPE: ACUTE PAIN

## 2019-12-22 ASSESSMENT — PAIN DESCRIPTION - FREQUENCY
FREQUENCY: CONTINUOUS
FREQUENCY: INTERMITTENT
FREQUENCY: INTERMITTENT
FREQUENCY: CONTINUOUS
FREQUENCY: CONTINUOUS

## 2019-12-22 ASSESSMENT — PAIN DESCRIPTION - LOCATION
LOCATION: BACK;HIP
LOCATION: BACK
LOCATION: BACK;HIP
LOCATION: BACK;HIP
LOCATION: BACK
LOCATION: BACK;HIP

## 2019-12-22 ASSESSMENT — PAIN - FUNCTIONAL ASSESSMENT
PAIN_FUNCTIONAL_ASSESSMENT: PREVENTS OR INTERFERES SOME ACTIVE ACTIVITIES AND ADLS
PAIN_FUNCTIONAL_ASSESSMENT: PREVENTS OR INTERFERES SOME ACTIVE ACTIVITIES AND ADLS

## 2019-12-22 ASSESSMENT — PAIN DESCRIPTION - PROGRESSION
CLINICAL_PROGRESSION: NOT CHANGED

## 2019-12-22 ASSESSMENT — PAIN DESCRIPTION - ORIENTATION
ORIENTATION: LOWER
ORIENTATION: LEFT;RIGHT;LOWER
ORIENTATION: RIGHT;LEFT;LOWER
ORIENTATION: LOWER
ORIENTATION: LOWER;LEFT;RIGHT
ORIENTATION: RIGHT;LOWER

## 2019-12-22 ASSESSMENT — PAIN DESCRIPTION - ONSET
ONSET: ON-GOING
ONSET: PROGRESSIVE

## 2019-12-23 LAB
ANION GAP SERPL CALCULATED.3IONS-SCNC: 16 MMOL/L (ref 3–16)
BUN BLDV-MCNC: 44 MG/DL (ref 7–20)
CALCIUM SERPL-MCNC: 9 MG/DL (ref 8.3–10.6)
CHLORIDE BLD-SCNC: 106 MMOL/L (ref 99–110)
CO2: 22 MMOL/L (ref 21–32)
CREAT SERPL-MCNC: 1.6 MG/DL (ref 0.8–1.3)
GFR AFRICAN AMERICAN: 49
GFR NON-AFRICAN AMERICAN: 40
GLUCOSE BLD-MCNC: 105 MG/DL (ref 70–99)
HCT VFR BLD CALC: 28.5 % (ref 40.5–52.5)
HEMOGLOBIN: 9.5 G/DL (ref 13.5–17.5)
MAGNESIUM: 2.1 MG/DL (ref 1.8–2.4)
MCH RBC QN AUTO: 32.1 PG (ref 26–34)
MCHC RBC AUTO-ENTMCNC: 33.4 G/DL (ref 31–36)
MCV RBC AUTO: 96.2 FL (ref 80–100)
PDW BLD-RTO: 14 % (ref 12.4–15.4)
PLATELET # BLD: 163 K/UL (ref 135–450)
PMV BLD AUTO: 7.5 FL (ref 5–10.5)
POTASSIUM REFLEX MAGNESIUM: 3.4 MMOL/L (ref 3.5–5.1)
RBC # BLD: 2.96 M/UL (ref 4.2–5.9)
SODIUM BLD-SCNC: 144 MMOL/L (ref 136–145)
WBC # BLD: 6.2 K/UL (ref 4–11)

## 2019-12-23 PROCEDURE — 97166 OT EVAL MOD COMPLEX 45 MIN: CPT

## 2019-12-23 PROCEDURE — 1200000000 HC SEMI PRIVATE

## 2019-12-23 PROCEDURE — 85027 COMPLETE CBC AUTOMATED: CPT

## 2019-12-23 PROCEDURE — 6370000000 HC RX 637 (ALT 250 FOR IP): Performed by: INTERNAL MEDICINE

## 2019-12-23 PROCEDURE — 6360000002 HC RX W HCPCS: Performed by: INTERNAL MEDICINE

## 2019-12-23 PROCEDURE — 94760 N-INVAS EAR/PLS OXIMETRY 1: CPT

## 2019-12-23 PROCEDURE — 97161 PT EVAL LOW COMPLEX 20 MIN: CPT

## 2019-12-23 PROCEDURE — 36415 COLL VENOUS BLD VENIPUNCTURE: CPT

## 2019-12-23 PROCEDURE — 83735 ASSAY OF MAGNESIUM: CPT

## 2019-12-23 PROCEDURE — 97530 THERAPEUTIC ACTIVITIES: CPT

## 2019-12-23 PROCEDURE — 80048 BASIC METABOLIC PNL TOTAL CA: CPT

## 2019-12-23 PROCEDURE — 2580000003 HC RX 258: Performed by: INTERNAL MEDICINE

## 2019-12-23 PROCEDURE — 99221 1ST HOSP IP/OBS SF/LOW 40: CPT | Performed by: INTERNAL MEDICINE

## 2019-12-23 RX ORDER — ATROPINE SULFATE 0.1 MG/ML
0.5 INJECTION INTRAVENOUS ONCE
Status: COMPLETED | OUTPATIENT
Start: 2019-12-23 | End: 2019-12-23

## 2019-12-23 RX ORDER — POTASSIUM CHLORIDE 750 MG/1
40 TABLET, FILM COATED, EXTENDED RELEASE ORAL ONCE
Status: COMPLETED | OUTPATIENT
Start: 2019-12-23 | End: 2019-12-23

## 2019-12-23 RX ADMIN — DONEPEZIL HYDROCHLORIDE 10 MG: 10 TABLET, FILM COATED ORAL at 20:44

## 2019-12-23 RX ADMIN — LISINOPRIL 10 MG: 10 TABLET ORAL at 10:04

## 2019-12-23 RX ADMIN — GEMFIBROZIL 600 MG: 600 TABLET ORAL at 06:24

## 2019-12-23 RX ADMIN — FUROSEMIDE 40 MG: 40 TABLET ORAL at 10:04

## 2019-12-23 RX ADMIN — POTASSIUM CHLORIDE 40 MEQ: 750 TABLET, FILM COATED, EXTENDED RELEASE ORAL at 12:51

## 2019-12-23 RX ADMIN — ISOSORBIDE MONONITRATE 60 MG: 60 TABLET, EXTENDED RELEASE ORAL at 08:23

## 2019-12-23 RX ADMIN — HEPARIN SODIUM 5000 UNITS: 5000 INJECTION INTRAVENOUS; SUBCUTANEOUS at 14:12

## 2019-12-23 RX ADMIN — HEPARIN SODIUM 5000 UNITS: 5000 INJECTION INTRAVENOUS; SUBCUTANEOUS at 06:24

## 2019-12-23 RX ADMIN — GEMFIBROZIL 600 MG: 600 TABLET ORAL at 17:14

## 2019-12-23 RX ADMIN — HYDRALAZINE HYDROCHLORIDE 100 MG: 50 TABLET, FILM COATED ORAL at 20:44

## 2019-12-23 RX ADMIN — SODIUM CHLORIDE, PRESERVATIVE FREE 10 ML: 5 INJECTION INTRAVENOUS at 20:38

## 2019-12-23 RX ADMIN — HYDRALAZINE HYDROCHLORIDE 100 MG: 50 TABLET, FILM COATED ORAL at 14:12

## 2019-12-23 RX ADMIN — TRAMADOL HYDROCHLORIDE 50 MG: 50 TABLET, FILM COATED ORAL at 12:51

## 2019-12-23 RX ADMIN — ASPIRIN 81 MG 81 MG: 81 TABLET ORAL at 08:24

## 2019-12-23 RX ADMIN — SODIUM CHLORIDE, PRESERVATIVE FREE 10 ML: 5 INJECTION INTRAVENOUS at 10:05

## 2019-12-23 RX ADMIN — GABAPENTIN 300 MG: 300 CAPSULE ORAL at 08:24

## 2019-12-23 RX ADMIN — DOCUSATE SODIUM 100 MG: 100 CAPSULE, LIQUID FILLED ORAL at 10:04

## 2019-12-23 RX ADMIN — ATROPINE SULFATE 0.5 MG: 0.1 INJECTION PARENTERAL at 06:08

## 2019-12-23 RX ADMIN — GABAPENTIN 300 MG: 300 CAPSULE ORAL at 20:44

## 2019-12-23 RX ADMIN — SERTRALINE HYDROCHLORIDE 50 MG: 50 TABLET ORAL at 10:04

## 2019-12-23 RX ADMIN — HYDRALAZINE HYDROCHLORIDE 100 MG: 50 TABLET, FILM COATED ORAL at 08:23

## 2019-12-23 ASSESSMENT — PAIN DESCRIPTION - FREQUENCY: FREQUENCY: CONTINUOUS

## 2019-12-23 ASSESSMENT — PAIN SCALES - GENERAL
PAINLEVEL_OUTOF10: 6
PAINLEVEL_OUTOF10: 2
PAINLEVEL_OUTOF10: 0

## 2019-12-23 ASSESSMENT — PAIN DESCRIPTION - DESCRIPTORS: DESCRIPTORS: ACHING

## 2019-12-23 ASSESSMENT — PAIN DESCRIPTION - ORIENTATION: ORIENTATION: MID

## 2019-12-23 ASSESSMENT — PAIN DESCRIPTION - PAIN TYPE: TYPE: ACUTE PAIN

## 2019-12-23 ASSESSMENT — PAIN DESCRIPTION - LOCATION: LOCATION: BACK

## 2019-12-23 ASSESSMENT — PAIN DESCRIPTION - ONSET: ONSET: ON-GOING

## 2019-12-24 PROCEDURE — 97535 SELF CARE MNGMENT TRAINING: CPT

## 2019-12-24 PROCEDURE — 1200000000 HC SEMI PRIVATE

## 2019-12-24 PROCEDURE — 97530 THERAPEUTIC ACTIVITIES: CPT

## 2019-12-24 PROCEDURE — 6370000000 HC RX 637 (ALT 250 FOR IP): Performed by: INTERNAL MEDICINE

## 2019-12-24 PROCEDURE — 6360000002 HC RX W HCPCS: Performed by: INTERNAL MEDICINE

## 2019-12-24 PROCEDURE — 2580000003 HC RX 258: Performed by: INTERNAL MEDICINE

## 2019-12-24 PROCEDURE — 99231 SBSQ HOSP IP/OBS SF/LOW 25: CPT | Performed by: NURSE PRACTITIONER

## 2019-12-24 RX ORDER — LISINOPRIL 20 MG/1
20 TABLET ORAL DAILY
Status: DISCONTINUED | OUTPATIENT
Start: 2019-12-25 | End: 2019-12-25

## 2019-12-24 RX ADMIN — LISINOPRIL 10 MG: 10 TABLET ORAL at 09:14

## 2019-12-24 RX ADMIN — DOCUSATE SODIUM 100 MG: 100 CAPSULE, LIQUID FILLED ORAL at 09:14

## 2019-12-24 RX ADMIN — DOCUSATE SODIUM 100 MG: 100 CAPSULE, LIQUID FILLED ORAL at 21:52

## 2019-12-24 RX ADMIN — SERTRALINE HYDROCHLORIDE 50 MG: 50 TABLET ORAL at 09:14

## 2019-12-24 RX ADMIN — DONEPEZIL HYDROCHLORIDE 10 MG: 10 TABLET, FILM COATED ORAL at 21:52

## 2019-12-24 RX ADMIN — SODIUM CHLORIDE, PRESERVATIVE FREE 10 ML: 5 INJECTION INTRAVENOUS at 21:52

## 2019-12-24 RX ADMIN — HYDRALAZINE HYDROCHLORIDE 100 MG: 50 TABLET, FILM COATED ORAL at 09:14

## 2019-12-24 RX ADMIN — GEMFIBROZIL 600 MG: 600 TABLET ORAL at 17:52

## 2019-12-24 RX ADMIN — ISOSORBIDE MONONITRATE 60 MG: 60 TABLET, EXTENDED RELEASE ORAL at 09:14

## 2019-12-24 RX ADMIN — HYDRALAZINE HYDROCHLORIDE 100 MG: 50 TABLET, FILM COATED ORAL at 17:51

## 2019-12-24 RX ADMIN — HYDRALAZINE HYDROCHLORIDE 100 MG: 50 TABLET, FILM COATED ORAL at 21:52

## 2019-12-24 RX ADMIN — TRAMADOL HYDROCHLORIDE 100 MG: 50 TABLET, FILM COATED ORAL at 09:14

## 2019-12-24 RX ADMIN — GEMFIBROZIL 600 MG: 600 TABLET ORAL at 09:13

## 2019-12-24 RX ADMIN — SODIUM CHLORIDE, PRESERVATIVE FREE 10 ML: 5 INJECTION INTRAVENOUS at 09:14

## 2019-12-24 RX ADMIN — HYDRALAZINE HYDROCHLORIDE 10 MG: 20 INJECTION INTRAMUSCULAR; INTRAVENOUS at 11:05

## 2019-12-24 RX ADMIN — GABAPENTIN 300 MG: 300 CAPSULE ORAL at 21:52

## 2019-12-24 RX ADMIN — HEPARIN SODIUM 5000 UNITS: 5000 INJECTION INTRAVENOUS; SUBCUTANEOUS at 00:54

## 2019-12-24 RX ADMIN — ASPIRIN 81 MG 81 MG: 81 TABLET ORAL at 09:14

## 2019-12-24 RX ADMIN — GABAPENTIN 300 MG: 300 CAPSULE ORAL at 09:14

## 2019-12-24 RX ADMIN — HEPARIN SODIUM 5000 UNITS: 5000 INJECTION INTRAVENOUS; SUBCUTANEOUS at 09:14

## 2019-12-24 RX ADMIN — FUROSEMIDE 40 MG: 40 TABLET ORAL at 09:14

## 2019-12-24 RX ADMIN — HEPARIN SODIUM 5000 UNITS: 5000 INJECTION INTRAVENOUS; SUBCUTANEOUS at 17:52

## 2019-12-24 ASSESSMENT — PAIN DESCRIPTION - FREQUENCY
FREQUENCY: CONTINUOUS
FREQUENCY: CONTINUOUS

## 2019-12-24 ASSESSMENT — PAIN DESCRIPTION - ORIENTATION
ORIENTATION: LOWER
ORIENTATION: LOWER

## 2019-12-24 ASSESSMENT — PAIN SCALES - GENERAL
PAINLEVEL_OUTOF10: 7
PAINLEVEL_OUTOF10: 0
PAINLEVEL_OUTOF10: 0
PAINLEVEL_OUTOF10: 7
PAINLEVEL_OUTOF10: 0

## 2019-12-24 ASSESSMENT — PAIN DESCRIPTION - DESCRIPTORS
DESCRIPTORS: ACHING;JABBING
DESCRIPTORS: ACHING;JABBING

## 2019-12-24 ASSESSMENT — PAIN DESCRIPTION - PROGRESSION
CLINICAL_PROGRESSION: NOT CHANGED
CLINICAL_PROGRESSION: NOT CHANGED

## 2019-12-24 ASSESSMENT — PAIN DESCRIPTION - ONSET
ONSET: ON-GOING
ONSET: ON-GOING

## 2019-12-24 ASSESSMENT — PAIN DESCRIPTION - PAIN TYPE
TYPE: ACUTE PAIN
TYPE: ACUTE PAIN

## 2019-12-24 ASSESSMENT — PAIN DESCRIPTION - LOCATION
LOCATION: BACK
LOCATION: BACK

## 2019-12-25 PROCEDURE — 6370000000 HC RX 637 (ALT 250 FOR IP): Performed by: INTERNAL MEDICINE

## 2019-12-25 PROCEDURE — 2580000003 HC RX 258: Performed by: INTERNAL MEDICINE

## 2019-12-25 PROCEDURE — 1200000000 HC SEMI PRIVATE

## 2019-12-25 PROCEDURE — 6360000002 HC RX W HCPCS: Performed by: INTERNAL MEDICINE

## 2019-12-25 PROCEDURE — 6370000000 HC RX 637 (ALT 250 FOR IP): Performed by: HOSPITALIST

## 2019-12-25 PROCEDURE — 94760 N-INVAS EAR/PLS OXIMETRY 1: CPT

## 2019-12-25 RX ORDER — LISINOPRIL 40 MG/1
40 TABLET ORAL DAILY
Status: DISCONTINUED | OUTPATIENT
Start: 2019-12-26 | End: 2019-12-26

## 2019-12-25 RX ORDER — LACTULOSE 10 G/15ML
20 SOLUTION ORAL 3 TIMES DAILY PRN
Status: DISCONTINUED | OUTPATIENT
Start: 2019-12-25 | End: 2019-12-27 | Stop reason: HOSPADM

## 2019-12-25 RX ORDER — LACTULOSE 10 G/15ML
20 SOLUTION ORAL 3 TIMES DAILY
Status: DISCONTINUED | OUTPATIENT
Start: 2019-12-25 | End: 2019-12-25

## 2019-12-25 RX ORDER — AMLODIPINE BESYLATE 10 MG/1
10 TABLET ORAL DAILY
Status: DISCONTINUED | OUTPATIENT
Start: 2019-12-25 | End: 2019-12-27 | Stop reason: HOSPADM

## 2019-12-25 RX ADMIN — GEMFIBROZIL 600 MG: 600 TABLET ORAL at 06:26

## 2019-12-25 RX ADMIN — HEPARIN SODIUM 5000 UNITS: 5000 INJECTION INTRAVENOUS; SUBCUTANEOUS at 01:00

## 2019-12-25 RX ADMIN — AMLODIPINE BESYLATE 10 MG: 10 TABLET ORAL at 11:33

## 2019-12-25 RX ADMIN — DOCUSATE SODIUM 100 MG: 100 CAPSULE, LIQUID FILLED ORAL at 21:54

## 2019-12-25 RX ADMIN — SERTRALINE HYDROCHLORIDE 50 MG: 50 TABLET ORAL at 08:17

## 2019-12-25 RX ADMIN — GABAPENTIN 300 MG: 300 CAPSULE ORAL at 08:16

## 2019-12-25 RX ADMIN — HEPARIN SODIUM 5000 UNITS: 5000 INJECTION INTRAVENOUS; SUBCUTANEOUS at 08:18

## 2019-12-25 RX ADMIN — FUROSEMIDE 40 MG: 40 TABLET ORAL at 08:17

## 2019-12-25 RX ADMIN — DONEPEZIL HYDROCHLORIDE 10 MG: 10 TABLET, FILM COATED ORAL at 21:54

## 2019-12-25 RX ADMIN — GEMFIBROZIL 600 MG: 600 TABLET ORAL at 15:34

## 2019-12-25 RX ADMIN — TRAMADOL HYDROCHLORIDE 50 MG: 50 TABLET, FILM COATED ORAL at 06:23

## 2019-12-25 RX ADMIN — LISINOPRIL 20 MG: 20 TABLET ORAL at 08:17

## 2019-12-25 RX ADMIN — ASPIRIN 81 MG 81 MG: 81 TABLET ORAL at 08:17

## 2019-12-25 RX ADMIN — DOCUSATE SODIUM 100 MG: 100 CAPSULE, LIQUID FILLED ORAL at 08:18

## 2019-12-25 RX ADMIN — HEPARIN SODIUM 5000 UNITS: 5000 INJECTION INTRAVENOUS; SUBCUTANEOUS at 19:04

## 2019-12-25 RX ADMIN — SODIUM CHLORIDE, PRESERVATIVE FREE 10 ML: 5 INJECTION INTRAVENOUS at 08:18

## 2019-12-25 RX ADMIN — GABAPENTIN 300 MG: 300 CAPSULE ORAL at 21:54

## 2019-12-25 RX ADMIN — HYDRALAZINE HYDROCHLORIDE 100 MG: 50 TABLET, FILM COATED ORAL at 15:33

## 2019-12-25 RX ADMIN — HYDRALAZINE HYDROCHLORIDE 100 MG: 50 TABLET, FILM COATED ORAL at 21:54

## 2019-12-25 RX ADMIN — HYDRALAZINE HYDROCHLORIDE 100 MG: 50 TABLET, FILM COATED ORAL at 08:16

## 2019-12-25 RX ADMIN — ISOSORBIDE MONONITRATE 60 MG: 60 TABLET, EXTENDED RELEASE ORAL at 08:18

## 2019-12-25 RX ADMIN — SODIUM CHLORIDE, PRESERVATIVE FREE 10 ML: 5 INJECTION INTRAVENOUS at 21:54

## 2019-12-25 ASSESSMENT — PAIN DESCRIPTION - DESCRIPTORS: DESCRIPTORS: ACHING

## 2019-12-25 ASSESSMENT — PAIN - FUNCTIONAL ASSESSMENT: PAIN_FUNCTIONAL_ASSESSMENT: PREVENTS OR INTERFERES SOME ACTIVE ACTIVITIES AND ADLS

## 2019-12-25 ASSESSMENT — PAIN SCALES - GENERAL
PAINLEVEL_OUTOF10: 5
PAINLEVEL_OUTOF10: 0

## 2019-12-25 ASSESSMENT — PAIN DESCRIPTION - LOCATION: LOCATION: BACK

## 2019-12-25 ASSESSMENT — PAIN DESCRIPTION - PROGRESSION: CLINICAL_PROGRESSION: NOT CHANGED

## 2019-12-25 ASSESSMENT — PAIN DESCRIPTION - FREQUENCY: FREQUENCY: CONTINUOUS

## 2019-12-25 ASSESSMENT — PAIN DESCRIPTION - ORIENTATION: ORIENTATION: LOWER

## 2019-12-25 ASSESSMENT — PAIN DESCRIPTION - PAIN TYPE: TYPE: CHRONIC PAIN

## 2019-12-25 ASSESSMENT — PAIN DESCRIPTION - ONSET: ONSET: ON-GOING

## 2019-12-25 ASSESSMENT — PAIN SCALES - WONG BAKER: WONGBAKER_NUMERICALRESPONSE: 0

## 2019-12-26 LAB
ANION GAP SERPL CALCULATED.3IONS-SCNC: 15 MMOL/L (ref 3–16)
BUN BLDV-MCNC: 36 MG/DL (ref 7–20)
CALCIUM SERPL-MCNC: 9 MG/DL (ref 8.3–10.6)
CHLORIDE BLD-SCNC: 104 MMOL/L (ref 99–110)
CO2: 24 MMOL/L (ref 21–32)
CREAT SERPL-MCNC: 1.5 MG/DL (ref 0.8–1.3)
GFR AFRICAN AMERICAN: 53
GFR NON-AFRICAN AMERICAN: 44
GLUCOSE BLD-MCNC: 107 MG/DL (ref 70–99)
POTASSIUM SERPL-SCNC: 3.6 MMOL/L (ref 3.5–5.1)
SODIUM BLD-SCNC: 143 MMOL/L (ref 136–145)
TROPONIN: 0.05 NG/ML
TSH REFLEX: 1.02 UIU/ML (ref 0.27–4.2)
VITAMIN D 25-HYDROXY: 52.3 NG/ML

## 2019-12-26 PROCEDURE — 6370000000 HC RX 637 (ALT 250 FOR IP): Performed by: HOSPITALIST

## 2019-12-26 PROCEDURE — 6370000000 HC RX 637 (ALT 250 FOR IP): Performed by: INTERNAL MEDICINE

## 2019-12-26 PROCEDURE — 94760 N-INVAS EAR/PLS OXIMETRY 1: CPT

## 2019-12-26 PROCEDURE — 82306 VITAMIN D 25 HYDROXY: CPT

## 2019-12-26 PROCEDURE — 80048 BASIC METABOLIC PNL TOTAL CA: CPT

## 2019-12-26 PROCEDURE — 1200000000 HC SEMI PRIVATE

## 2019-12-26 PROCEDURE — 84484 ASSAY OF TROPONIN QUANT: CPT

## 2019-12-26 PROCEDURE — 2580000003 HC RX 258: Performed by: INTERNAL MEDICINE

## 2019-12-26 PROCEDURE — 97535 SELF CARE MNGMENT TRAINING: CPT

## 2019-12-26 PROCEDURE — 84443 ASSAY THYROID STIM HORMONE: CPT

## 2019-12-26 PROCEDURE — 6360000002 HC RX W HCPCS: Performed by: INTERNAL MEDICINE

## 2019-12-26 PROCEDURE — 36415 COLL VENOUS BLD VENIPUNCTURE: CPT

## 2019-12-26 RX ORDER — TRAMADOL HYDROCHLORIDE 50 MG/1
50 TABLET ORAL EVERY 6 HOURS PRN
Qty: 15 TABLET | Refills: 0 | Status: SHIPPED | OUTPATIENT
Start: 2019-12-26 | End: 2019-12-29

## 2019-12-26 RX ORDER — AMLODIPINE BESYLATE 10 MG/1
10 TABLET ORAL DAILY
Qty: 30 TABLET | Refills: 3 | Status: SHIPPED | OUTPATIENT
Start: 2019-12-27 | End: 2020-03-18 | Stop reason: SDUPTHER

## 2019-12-26 RX ORDER — LISINOPRIL 20 MG/1
20 TABLET ORAL DAILY
Status: DISCONTINUED | OUTPATIENT
Start: 2019-12-26 | End: 2019-12-27 | Stop reason: HOSPADM

## 2019-12-26 RX ADMIN — SODIUM CHLORIDE, PRESERVATIVE FREE 10 ML: 5 INJECTION INTRAVENOUS at 09:10

## 2019-12-26 RX ADMIN — HEPARIN SODIUM 5000 UNITS: 5000 INJECTION INTRAVENOUS; SUBCUTANEOUS at 16:35

## 2019-12-26 RX ADMIN — FUROSEMIDE 40 MG: 40 TABLET ORAL at 09:09

## 2019-12-26 RX ADMIN — GEMFIBROZIL 600 MG: 600 TABLET ORAL at 16:35

## 2019-12-26 RX ADMIN — GABAPENTIN 300 MG: 300 CAPSULE ORAL at 09:09

## 2019-12-26 RX ADMIN — TRAMADOL HYDROCHLORIDE 50 MG: 50 TABLET, FILM COATED ORAL at 18:36

## 2019-12-26 RX ADMIN — TRAMADOL HYDROCHLORIDE 50 MG: 50 TABLET, FILM COATED ORAL at 02:45

## 2019-12-26 RX ADMIN — GEMFIBROZIL 600 MG: 600 TABLET ORAL at 06:26

## 2019-12-26 RX ADMIN — MAGNESIUM HYDROXIDE 30 ML: 400 SUSPENSION ORAL at 09:09

## 2019-12-26 RX ADMIN — HEPARIN SODIUM 5000 UNITS: 5000 INJECTION INTRAVENOUS; SUBCUTANEOUS at 01:00

## 2019-12-26 RX ADMIN — DOCUSATE SODIUM 100 MG: 100 CAPSULE, LIQUID FILLED ORAL at 22:12

## 2019-12-26 RX ADMIN — HYDRALAZINE HYDROCHLORIDE 100 MG: 50 TABLET, FILM COATED ORAL at 15:06

## 2019-12-26 RX ADMIN — LISINOPRIL 20 MG: 20 TABLET ORAL at 09:09

## 2019-12-26 RX ADMIN — DOCUSATE SODIUM 100 MG: 100 CAPSULE, LIQUID FILLED ORAL at 09:09

## 2019-12-26 RX ADMIN — HEPARIN SODIUM 5000 UNITS: 5000 INJECTION INTRAVENOUS; SUBCUTANEOUS at 09:10

## 2019-12-26 RX ADMIN — TIZANIDINE 2 MG: 4 TABLET ORAL at 02:45

## 2019-12-26 RX ADMIN — DONEPEZIL HYDROCHLORIDE 10 MG: 10 TABLET, FILM COATED ORAL at 22:12

## 2019-12-26 RX ADMIN — ASPIRIN 81 MG 81 MG: 81 TABLET ORAL at 09:09

## 2019-12-26 RX ADMIN — SODIUM CHLORIDE, PRESERVATIVE FREE 10 ML: 5 INJECTION INTRAVENOUS at 22:13

## 2019-12-26 RX ADMIN — SERTRALINE HYDROCHLORIDE 50 MG: 50 TABLET ORAL at 09:09

## 2019-12-26 RX ADMIN — ISOSORBIDE MONONITRATE 60 MG: 60 TABLET, EXTENDED RELEASE ORAL at 09:09

## 2019-12-26 RX ADMIN — GABAPENTIN 300 MG: 300 CAPSULE ORAL at 22:12

## 2019-12-26 RX ADMIN — HYDRALAZINE HYDROCHLORIDE 100 MG: 50 TABLET, FILM COATED ORAL at 22:12

## 2019-12-26 RX ADMIN — AMLODIPINE BESYLATE 10 MG: 10 TABLET ORAL at 09:09

## 2019-12-26 RX ADMIN — HYDRALAZINE HYDROCHLORIDE 100 MG: 50 TABLET, FILM COATED ORAL at 09:09

## 2019-12-26 ASSESSMENT — PAIN SCALES - WONG BAKER
WONGBAKER_NUMERICALRESPONSE: 0

## 2019-12-26 ASSESSMENT — PAIN DESCRIPTION - LOCATION
LOCATION: BACK
LOCATION: BACK

## 2019-12-26 ASSESSMENT — PAIN DESCRIPTION - ORIENTATION
ORIENTATION: LOWER
ORIENTATION: LOWER

## 2019-12-26 ASSESSMENT — PAIN DESCRIPTION - ONSET
ONSET: ON-GOING
ONSET: ON-GOING

## 2019-12-26 ASSESSMENT — PAIN DESCRIPTION - FREQUENCY
FREQUENCY: INTERMITTENT
FREQUENCY: INTERMITTENT

## 2019-12-26 ASSESSMENT — PAIN DESCRIPTION - PAIN TYPE
TYPE: ACUTE PAIN
TYPE: ACUTE PAIN

## 2019-12-26 ASSESSMENT — PAIN SCALES - GENERAL
PAINLEVEL_OUTOF10: 5
PAINLEVEL_OUTOF10: 0
PAINLEVEL_OUTOF10: 4
PAINLEVEL_OUTOF10: 0

## 2019-12-26 ASSESSMENT — PAIN DESCRIPTION - DESCRIPTORS
DESCRIPTORS: ACHING
DESCRIPTORS: ACHING;SHARP;SHOOTING

## 2019-12-26 ASSESSMENT — PAIN DESCRIPTION - PROGRESSION: CLINICAL_PROGRESSION: GRADUALLY IMPROVING

## 2019-12-27 VITALS
DIASTOLIC BLOOD PRESSURE: 59 MMHG | BODY MASS INDEX: 20.42 KG/M2 | HEART RATE: 38 BPM | HEIGHT: 72 IN | WEIGHT: 150.79 LBS | TEMPERATURE: 97.9 F | SYSTOLIC BLOOD PRESSURE: 160 MMHG | RESPIRATION RATE: 18 BRPM | OXYGEN SATURATION: 95 %

## 2019-12-27 PROCEDURE — 6370000000 HC RX 637 (ALT 250 FOR IP): Performed by: HOSPITALIST

## 2019-12-27 PROCEDURE — 94760 N-INVAS EAR/PLS OXIMETRY 1: CPT

## 2019-12-27 PROCEDURE — 2580000003 HC RX 258: Performed by: INTERNAL MEDICINE

## 2019-12-27 PROCEDURE — 6370000000 HC RX 637 (ALT 250 FOR IP): Performed by: INTERNAL MEDICINE

## 2019-12-27 PROCEDURE — 6360000002 HC RX W HCPCS: Performed by: INTERNAL MEDICINE

## 2019-12-27 RX ADMIN — GABAPENTIN 300 MG: 300 CAPSULE ORAL at 08:45

## 2019-12-27 RX ADMIN — ASPIRIN 81 MG 81 MG: 81 TABLET ORAL at 08:45

## 2019-12-27 RX ADMIN — HYDRALAZINE HYDROCHLORIDE 100 MG: 50 TABLET, FILM COATED ORAL at 08:45

## 2019-12-27 RX ADMIN — AMLODIPINE BESYLATE 10 MG: 10 TABLET ORAL at 08:45

## 2019-12-27 RX ADMIN — SERTRALINE HYDROCHLORIDE 50 MG: 50 TABLET ORAL at 08:45

## 2019-12-27 RX ADMIN — LISINOPRIL 20 MG: 20 TABLET ORAL at 08:45

## 2019-12-27 RX ADMIN — ACETAMINOPHEN 650 MG: 325 TABLET ORAL at 12:34

## 2019-12-27 RX ADMIN — ISOSORBIDE MONONITRATE 60 MG: 60 TABLET, EXTENDED RELEASE ORAL at 08:45

## 2019-12-27 RX ADMIN — HEPARIN SODIUM 5000 UNITS: 5000 INJECTION INTRAVENOUS; SUBCUTANEOUS at 08:46

## 2019-12-27 RX ADMIN — SODIUM CHLORIDE, PRESERVATIVE FREE 10 ML: 5 INJECTION INTRAVENOUS at 08:46

## 2019-12-27 RX ADMIN — TRAMADOL HYDROCHLORIDE 50 MG: 50 TABLET, FILM COATED ORAL at 12:47

## 2019-12-27 RX ADMIN — GEMFIBROZIL 600 MG: 600 TABLET ORAL at 06:28

## 2019-12-27 RX ADMIN — FUROSEMIDE 40 MG: 40 TABLET ORAL at 08:45

## 2019-12-27 RX ADMIN — HYDRALAZINE HYDROCHLORIDE 100 MG: 50 TABLET, FILM COATED ORAL at 12:34

## 2019-12-27 RX ADMIN — HEPARIN SODIUM 5000 UNITS: 5000 INJECTION INTRAVENOUS; SUBCUTANEOUS at 01:23

## 2019-12-27 RX ADMIN — DOCUSATE SODIUM 100 MG: 100 CAPSULE, LIQUID FILLED ORAL at 08:45

## 2019-12-27 ASSESSMENT — PAIN SCALES - GENERAL
PAINLEVEL_OUTOF10: 8
PAINLEVEL_OUTOF10: 6
PAINLEVEL_OUTOF10: 0
PAINLEVEL_OUTOF10: 6

## 2019-12-27 ASSESSMENT — PAIN DESCRIPTION - LOCATION: LOCATION: BACK

## 2019-12-27 ASSESSMENT — PAIN DESCRIPTION - ORIENTATION: ORIENTATION: LOWER

## 2019-12-27 ASSESSMENT — PAIN - FUNCTIONAL ASSESSMENT: PAIN_FUNCTIONAL_ASSESSMENT: PREVENTS OR INTERFERES SOME ACTIVE ACTIVITIES AND ADLS

## 2019-12-27 ASSESSMENT — PAIN DESCRIPTION - PAIN TYPE: TYPE: ACUTE PAIN

## 2019-12-27 ASSESSMENT — PAIN DESCRIPTION - PROGRESSION: CLINICAL_PROGRESSION: GRADUALLY WORSENING

## 2019-12-27 ASSESSMENT — PAIN DESCRIPTION - DESCRIPTORS: DESCRIPTORS: ACHING;SHARP;SHOOTING

## 2019-12-27 ASSESSMENT — PAIN DESCRIPTION - FREQUENCY: FREQUENCY: INTERMITTENT

## 2019-12-27 ASSESSMENT — PAIN DESCRIPTION - ONSET: ONSET: GRADUAL

## 2020-01-23 ENCOUNTER — TELEPHONE (OUTPATIENT)
Dept: FAMILY MEDICINE CLINIC | Age: 85
End: 2020-01-23

## 2020-01-31 ENCOUNTER — TELEPHONE (OUTPATIENT)
Dept: FAMILY MEDICINE CLINIC | Age: 85
End: 2020-01-31

## 2020-03-03 ENCOUNTER — TELEPHONE (OUTPATIENT)
Dept: FAMILY MEDICINE CLINIC | Age: 85
End: 2020-03-03

## 2020-03-04 ENCOUNTER — HOSPITAL ENCOUNTER (EMERGENCY)
Age: 85
Discharge: HOME | End: 2020-03-04
Attending: EMERGENCY MEDICINE
Payer: MEDICARE

## 2020-03-04 VITALS
RESPIRATION RATE: 16 BRPM | WEIGHT: 147.49 LBS | SYSTOLIC BLOOD PRESSURE: 150 MMHG | OXYGEN SATURATION: 98 % | HEIGHT: 71 IN | BODY MASS INDEX: 20.65 KG/M2 | DIASTOLIC BLOOD PRESSURE: 45 MMHG | TEMPERATURE: 97.5 F | HEART RATE: 49 BPM

## 2020-03-04 LAB
A/G RATIO: 1.5 (ref 1.1–2.2)
ALBUMIN SERPL-MCNC: 3.8 G/DL (ref 3.4–5)
ALP BLD-CCNC: 72 U/L (ref 40–129)
ALT SERPL-CCNC: 6 U/L (ref 10–40)
ANION GAP SERPL CALCULATED.3IONS-SCNC: 16 MMOL/L (ref 3–16)
AST SERPL-CCNC: 12 U/L (ref 15–37)
BASOPHILS ABSOLUTE: 0 K/UL (ref 0–0.2)
BASOPHILS RELATIVE PERCENT: 0.5 %
BILIRUB SERPL-MCNC: 0.3 MG/DL (ref 0–1)
BILIRUBIN URINE: ABNORMAL
BLOOD, URINE: ABNORMAL
BUN BLDV-MCNC: 43 MG/DL (ref 7–20)
CALCIUM SERPL-MCNC: 9.3 MG/DL (ref 8.3–10.6)
CHLORIDE BLD-SCNC: 102 MMOL/L (ref 99–110)
CLARITY: ABNORMAL
CO2: 21 MMOL/L (ref 21–32)
COLOR: ABNORMAL
COMMENT UA: ABNORMAL
CREAT SERPL-MCNC: 1.5 MG/DL (ref 0.8–1.3)
EOSINOPHILS ABSOLUTE: 0 K/UL (ref 0–0.6)
EOSINOPHILS RELATIVE PERCENT: 0.3 %
GFR AFRICAN AMERICAN: 53
GFR NON-AFRICAN AMERICAN: 44
GLOBULIN: 2.5 G/DL
GLUCOSE BLD-MCNC: 126 MG/DL (ref 70–99)
GLUCOSE URINE: NEGATIVE MG/DL
HCT VFR BLD CALC: 24 % (ref 40.5–52.5)
HEMOGLOBIN: 8.2 G/DL (ref 13.5–17.5)
KETONES, URINE: ABNORMAL MG/DL
LEUKOCYTE ESTERASE, URINE: ABNORMAL
LYMPHOCYTES ABSOLUTE: 1.5 K/UL (ref 1–5.1)
LYMPHOCYTES RELATIVE PERCENT: 21.1 %
MCH RBC QN AUTO: 32.4 PG (ref 26–34)
MCHC RBC AUTO-ENTMCNC: 34 G/DL (ref 31–36)
MCV RBC AUTO: 95.2 FL (ref 80–100)
MICROSCOPIC EXAMINATION: YES
MONOCYTES ABSOLUTE: 0.5 K/UL (ref 0–1.3)
MONOCYTES RELATIVE PERCENT: 6.3 %
NEUTROPHILS ABSOLUTE: 5.2 K/UL (ref 1.7–7.7)
NEUTROPHILS RELATIVE PERCENT: 71.8 %
NITRITE, URINE: POSITIVE
PDW BLD-RTO: 14.2 % (ref 12.4–15.4)
PH UA: 6.5 (ref 5–8)
PLATELET # BLD: 192 K/UL (ref 135–450)
PMV BLD AUTO: 6.9 FL (ref 5–10.5)
POTASSIUM REFLEX MAGNESIUM: 3.9 MMOL/L (ref 3.5–5.1)
PROTEIN UA: >=300 MG/DL
RBC # BLD: 2.52 M/UL (ref 4.2–5.9)
RBC UA: >100 /HPF (ref 0–4)
SODIUM BLD-SCNC: 139 MMOL/L (ref 136–145)
SPECIFIC GRAVITY UA: 1.02 (ref 1–1.03)
TOTAL PROTEIN: 6.3 G/DL (ref 6.4–8.2)
URINE REFLEX TO CULTURE: YES
URINE TYPE: ABNORMAL
UROBILINOGEN, URINE: 1 E.U./DL
WBC # BLD: 7.2 K/UL (ref 4–11)
WBC UA: ABNORMAL /HPF (ref 0–5)

## 2020-03-04 PROCEDURE — 81001 URINALYSIS AUTO W/SCOPE: CPT

## 2020-03-04 PROCEDURE — 99283 EMERGENCY DEPT VISIT LOW MDM: CPT

## 2020-03-04 PROCEDURE — 87086 URINE CULTURE/COLONY COUNT: CPT

## 2020-03-04 PROCEDURE — 85025 COMPLETE CBC W/AUTO DIFF WBC: CPT

## 2020-03-04 PROCEDURE — 6370000000 HC RX 637 (ALT 250 FOR IP): Performed by: NURSE PRACTITIONER

## 2020-03-04 PROCEDURE — 80053 COMPREHEN METABOLIC PANEL: CPT

## 2020-03-04 RX ORDER — CEFUROXIME AXETIL 250 MG/1
250 TABLET ORAL 2 TIMES DAILY
Qty: 14 TABLET | Refills: 0 | Status: SHIPPED | OUTPATIENT
Start: 2020-03-04 | End: 2020-03-11

## 2020-03-04 RX ORDER — CEFUROXIME AXETIL 250 MG/1
250 TABLET ORAL ONCE
Status: COMPLETED | OUTPATIENT
Start: 2020-03-04 | End: 2020-03-04

## 2020-03-04 RX ADMIN — CEFUROXIME AXETIL 250 MG: 250 TABLET ORAL at 03:21

## 2020-03-04 ASSESSMENT — ENCOUNTER SYMPTOMS
BACK PAIN: 0
ABDOMINAL DISTENTION: 0
ABDOMINAL PAIN: 0

## 2020-03-04 NOTE — ED PROVIDER NOTES
 Allergic rhinitis     Anxiety     Arthritis     Atrial fibrillation (HCC)     CAD (coronary artery disease)     Carotid artery disease- LICA < 89% (repeat 7/59) 9/6/2011    Central hearing loss     CHF (congestive heart failure) ([address]     Chronic kidney disease     Colon polyp- last colon 5/22/09 5/19/2011    COPD (chronic obstructive pulmonary disease) (HCC)     Dementia (HCC)     Dementia ([address]     Depression     Diet-controlled diabetes mellitus (HCC)     Dizziness     Eczema     GERD (gastroesophageal reflux disease)     Groin strain 1/26/2018    Headache     Herpes zoster     HTN (hypertension)     Hyperlipidemia     Hypertrophy of prostate without urinary obstruction and other lower urinary tract symptoms (LUTS)     Lung nodules     Osteoporosis     Pneumonia     Prostate cancer ([address] 2001    TURP, seeds    Pulmonary HTN ([address]     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    TIA (transient ischemic attack)     Urinary retention 6/6/2017    Vertigo          SURGICAL HISTORY     Past Surgical History:   Procedure Laterality Date    CARDIAC SURGERY      COLONOSCOPY      CORONARY ARTERY BYPASS GRAFT  12/2005    CYSTOSCOPY  04/14/2017    CYSTOSCOPY, EVACUATION OF CLOTS, FULGURATION, BLADDER BIOPSY    EYE SURGERY  1998    LEFT RETINAL TEAR    INNER EAR SURGERY  1999    FOR VERTIGO    LUNG BIOPSY  1971    wedge resection, RLL, benign     PROSTATE SURGERY  2001    redo TURP for cancer    SKIN BIOPSY      TURP  1981         CURRENTMEDICATIONS       Discharge Medication List as of 3/4/2020  3:22 AM      CONTINUE these medications which have NOT CHANGED    Details   amLODIPine (NORVASC) 10 MG tablet Take 1 tablet by mouth daily, Disp-30 tablet, R-3Print      gemfibrozil (LOPID) 600 MG tablet Take 600 mg by mouth 2 times daily (before meals)Historical Med      furosemide (LASIX) 40 MG tablet Take 1 tablet by mouth daily Take 40 mg daily. Increase to 60 mg if weight over 164 lbs. NEW DIRECTIONSHistorical Med      donepezil (ARICEPT) 10 MG tablet Take 1 tablet by mouth nightly AFTER COMPLETE 30 DAYS AT 5 MG, Disp-90 tablet, R-1Normal      sertraline (ZOLOFT) 50 MG tablet Take 1 tablet by mouth daily, Disp-90 tablet, R-0Normal      isosorbide mononitrate (IMDUR) 60 MG extended release tablet Take 1 tablet by mouth daily Dr. Tosha Streeter - Cardio, Disp-90 tablet, R-0Normal      aspirin (ASPIRIN CHILDRENS) 81 MG chewable tablet Take 1 tablet by mouth daily, Disp-30 tablet, R-11Normal      lisinopril (PRINIVIL;ZESTRIL) 10 MG tablet Take 1 tablet by mouth daily, Disp-90 tablet, R-1NEW DOSAGENormal      docusate sodium (COLACE) 100 MG capsule Take 1 capsule by mouth 2 times daily, Disp-60 capsule, R-5Normal      gabapentin (NEURONTIN) 300 MG capsule Take 1 capsule by mouth 2 times daily, Disp-180 capsule, R-1Normal      hydrALAZINE (APRESOLINE) 100 MG tablet Take 1 tablet by mouth 3 times daily, Disp-90 tablet, R-1New dosagePrint      acetaminophen (TYLENOL) 325 MG tablet Take 650 mg by mouth every 6 hours as needed for PainHistorical Med               ALLERGIES     Escitalopram; Flexeril [cyclobenzaprine hcl];  Hydrocod polst-cpm polst er; Hydrocodone; Lipitor; Sudafed [pseudoephedrine hcl]; and Tussionex pennkinetic er [facility] polst-cpm polst er]    FAMILYHISTORY       Family History   Problem Relation Age of Onset    Dementia Brother     Cancer Son           SOCIAL HISTORY       Social History     Tobacco Use    Smoking status: Former Smoker     Last attempt to quit: 1960     Years since quittin.2    Smokeless tobacco: Never Used    Tobacco comment: Advised not to resume   Substance Use Topics    Alcohol use: No    Drug use: No       SCREENINGS             PHYSICAL EXAM    (up to 7 for level 4, 8 or more for level 5)     ED Triage Vitals [20 0151]   BP Temp Temp Source Pulse Resp SpO2 Height Weight   (!) 150/45 97.5 °F (36.4 (!) 150/45   Pulse: (!) 49   Resp: 16   Temp: 97.5 °F (36.4 °C)   TempSrc: Oral   SpO2: 98%   Weight: 147 lb 7.8 oz (66.9 kg)   Height: 5' 11\" (1.803 m)       Patient was given the following medications:  Medications   cefUROXime (CEFTIN) tablet 250 mg (250 mg Oral Given 3/4/20 0321)       Differential Diagnosis: Urinary retention, pyelonephritis, bladder infection, urosepsis, GI emergency, surgical emergency, dehydration, musculoskeletal back pain, vaginal candidiasis, other    Patient seen and examined today for bloody urine. See HPI for patient presentation. Patient is hemodynamically stable, nontoxic, afebrile, and without tachycardia, tachypnea, and hypoxia. Physical exam as above. 80year-old lying in bed in no acute distress. Alert but pleasantly confused. He is able to answer questions and participate in exam.  Abdomen soft and nontender. No neurovascular deficits. Urine shows nitrites with trace leukocytes and large amount of blood. WBCs on micro but unable to be counted. Chemistry shows chronic kidney disease it is stable with a creatinine of 1.5 GFR 44. No electrolyte abnormality or liver dysfunction. CBC shows no leukocytosis but does show anemia with a hemoglobin of 8.2. Patient given Ceftin. He looks well. He is pleasantly confused but demented. Patient's family called on the phone. They were advised that patient needs to take the antibiotics twice a day for 7 days and to follow-up with PCP this week to have his hemoglobin rechecked. They advised that they would do this. Patient was discharged home in stable condition. At this time, the evidence for any other entities in the differential is insufficient to justify any further testing. This was explained to the patient. The patient was advised that persistent or worsening symptoms will require further evaluation. I did discuss this case with attending physician, Dr. Louie Starkey, who agreed with the work-up and disposition.     I discussed with Fernando Fitch and/or family the exam results, diagnosis, care, prognosis, reasons to return and the importance of follow up. Patient and/or family is in full agreement with plan and all questions have been answered. Specific discharge instructions explained, including reasons to return to the emergency department. Fernando Fitch is well appearing, non-toxic, and afebrile at the time of discharge. Patient was instructed to follow up with primary care provider in 24-48 hours, and to instructed to return to ED immediately for any new or worsening concerns. Fernando Fitch verbalized understanding and discharged home. The patient tolerated their visit well. I saw the patient independently with physician available for consultation as needed. The patient and / or the family were informed of the results of any tests, a time was given to answer questions, a plan was proposed and they agreed with plan. FINAL IMPRESSION      1.  Urinary tract infection with hematuria, site unspecified          DISPOSITION/PLAN   DISPOSITION Decision To Discharge 03/04/2020 03:12:20 AM      PATIENT REFERREDTO:  Nicolette Loza MD  [address] [place] [address] Drive  [address] [place] [address]  [phone]    Schedule an appointment as soon as possible for a visit       [facility]  [phone]  [phone]  Go to   As needed      DISCHARGE MEDICATIONS:  Discharge Medication List as of 3/4/2020  3:22 AM      START taking these medications    Details   cefUROXime (CEFTIN) 250 MG tablet Take 1 tablet by mouth 2 times daily for 7 days, Disp-14 tablet, R-0Print             DISCONTINUED MEDICATIONS:  Discharge Medication List as of 3/4/2020  3:22 AM                 (Please note that portions of this note were completed with a voice recognition program.  Efforts were made to edit the dictations but occasionally words are mis-transcribed.)    Kenrick Maurice, APRN - CNP

## 2020-03-05 ENCOUNTER — TELEPHONE (OUTPATIENT)
Dept: FAMILY MEDICINE CLINIC | Age: 85
End: 2020-03-05

## 2020-03-05 LAB — URINE CULTURE, ROUTINE: NORMAL

## 2020-03-15 NOTE — ED PROVIDER NOTES
Attending Supervisory Note/Shared Visit   I have personally performed a face to face diagnostic evaluation on this patient. I have reviewed the mid-levels findings and agree. History and Exam by me shows a well-appearing male in no acute distress. Patient presented to the ED for evaluation of gross hematuria. Denied flank pain fevers nausea vomiting or abdominal pain. Patient has no complaints or discomfort on arrival to the ED. At time of my evaluation patient was awake and alert answering questions appropriately. Vital signs are within normal limits. Abdomen soft nontender distended. No CVA tenderness or flank pain. Patient's work-up remarkable for urine which appeared to be infected. Renal function appears to be at baseline. Hemoglobin slightly decreased compared to previous. Patient not on anticoagulation. patient started on antibiotics for treatment of urinary tract infection. I agree with the IRMA plan of care. Please IRMA Note for full ED course and final disposition. Disposition:  1.  Urinary tract infection with hematuria, site unspecified          Bassem Aponte MD  Attending Emergency Physician        Bassem Aponte MD  03/15/20 [id number]

## 2020-03-16 ENCOUNTER — TELEPHONE (OUTPATIENT)
Dept: FAMILY MEDICINE CLINIC | Age: 85
End: 2020-03-16

## 2020-03-18 RX ORDER — AMLODIPINE BESYLATE 10 MG/1
10 TABLET ORAL DAILY
Qty: 90 TABLET | Refills: 1 | Status: SHIPPED | OUTPATIENT
Start: 2020-03-18

## 2020-03-24 NOTE — ED NOTES
Pt to ultrasound at this time via stretcher pt on monitor and nurse Jac  with pt in ultrasound due to bradycardia      Ten Engle RN  11/21/18 0028 Pt. Was just told what Dr. Johnson's note said about con't the Bisoprolol /hctz and only changing the Amlodipine to the Losartan which Dr. Marroquin did however pt .HAS NOT STARTED THE LOSARTAN  Thinking that Dr. Johnson did not want want him on both the Bisoprolol.hctz and Losartan. Pt . Will call Dr. Johnson for clarification.

## 2020-04-15 RX ORDER — GABAPENTIN 300 MG/1
300 CAPSULE ORAL 2 TIMES DAILY
Qty: 60 CAPSULE | Refills: 1 | Status: SHIPPED | OUTPATIENT
Start: 2020-04-15 | End: 2020-08-24

## 2020-04-15 RX ORDER — GEMFIBROZIL 600 MG/1
600 TABLET, FILM COATED ORAL
Qty: 180 TABLET | Refills: 0 | Status: ON HOLD | OUTPATIENT
Start: 2020-04-15 | End: 2021-06-27

## 2020-04-20 RX ORDER — FUROSEMIDE 40 MG/1
40 TABLET ORAL DAILY
Qty: 60 TABLET | Refills: 1 | Status: SHIPPED | OUTPATIENT
Start: 2020-04-20

## 2020-04-20 RX ORDER — ISOSORBIDE MONONITRATE 60 MG/1
60 TABLET, EXTENDED RELEASE ORAL DAILY
Qty: 90 TABLET | Refills: 0 | Status: SHIPPED | OUTPATIENT
Start: 2020-04-20

## 2020-04-22 RX ORDER — LISINOPRIL 10 MG/1
10 TABLET ORAL DAILY
Qty: 90 TABLET | Refills: 1 | Status: ON HOLD | OUTPATIENT
Start: 2020-04-22 | End: 2021-06-29 | Stop reason: HOSPADM

## 2020-05-14 ENCOUNTER — OFFICE VISIT (OUTPATIENT)
Dept: PRIMARY CARE CLINIC | Age: 85
End: 2020-05-14

## 2020-05-14 ENCOUNTER — TELEPHONE (OUTPATIENT)
Dept: CARDIOLOGY CLINIC | Age: 85
End: 2020-05-14

## 2020-05-14 LAB — SARS-COV-2, NAAT: DETECTED

## 2020-05-14 NOTE — TELEPHONE ENCOUNTER
Discussed with Dr. Abel Isaac. Dr. Abel Isaac does not wish to order the Covid test, it would need to come from the the primary care physician at her discretion. The drive through clinic located at [facility] may be able to perform the test. Son can call [phone]. I called Ashley Red and made him aware he could call the number, but also cautioned him to check with [facility] about their policy on patient leaving and returning to the facility. He verbalized understanding.

## 2020-05-15 ENCOUNTER — TELEPHONE (OUTPATIENT)
Dept: FAMILY MEDICINE CLINIC | Age: 85
End: 2020-05-15

## 2020-05-15 NOTE — TELEPHONE ENCOUNTER
Apparently pt is asymptomatic and had not been isolated from other residents or staff. I explained that Diane Ryan did not order test and that orders are not needed at testing site.   They plan on retesting pt

## 2020-05-15 NOTE — TELEPHONE ENCOUNTER
[facility] CALLED   STATED THAT THEY GOT CALLED FROM THE HOSPITAL ABOUT PT TESTING POSITIVE FOR COVID  SHE WANTED TO KNOW WHO ORDERED THE TEST  I TOLD HER DR HIDALGO DIDN'T ORDER TEST  TRANSFERRED CALL TO TIFFANIE

## 2020-05-18 NOTE — TELEPHONE ENCOUNTER
Patient son called stating patient was tested positive on 5/15 for covid.    Patient is at a nursing home now and patient is son needing to get a retest for patient for covid  Patient son stating all symptoms are gone  Call patient son back on when we can get a reschedule for a covid test?    Please advise

## 2020-05-18 NOTE — TELEPHONE ENCOUNTER
2 options: 14 days of isolation from time of test OR if the positive test is holding up transfer, can test second time to see if negative since he is asymptomatic.

## 2020-05-19 NOTE — TELEPHONE ENCOUNTER
Called SOP and let him know what the protocol is and told him pt should stay in quarantine for 14 days since he is asymptomatic and then be retested

## 2020-05-29 ENCOUNTER — OFFICE VISIT (OUTPATIENT)
Dept: PRIMARY CARE CLINIC | Age: 85
End: 2020-05-29

## 2020-05-29 VITALS — TEMPERATURE: 97.9 F | OXYGEN SATURATION: 98 %

## 2020-05-30 LAB
SARS-COV-2: NOT DETECTED
SOURCE: NORMAL

## 2020-06-26 ENCOUNTER — TELEPHONE (OUTPATIENT)
Dept: FAMILY MEDICINE CLINIC | Age: 85
End: 2020-06-26

## 2020-06-26 NOTE — TELEPHONE ENCOUNTER
PT MOVED TO A MEMORY CARE FACILITY  [facility]  HE GOT A NEW DOCTOR IN THAT FACILITY     THIS IS AN FYI

## 2020-08-24 ENCOUNTER — OFFICE VISIT (OUTPATIENT)
Dept: ENT CLINIC | Age: 85
End: 2020-08-24
Payer: MEDICARE

## 2020-08-24 ENCOUNTER — OFFICE VISIT (OUTPATIENT)
Dept: CARDIOLOGY CLINIC | Age: 85
End: 2020-08-24
Payer: MEDICARE

## 2020-08-24 VITALS
BODY MASS INDEX: 23.1 KG/M2 | HEART RATE: 48 BPM | OXYGEN SATURATION: 98 % | DIASTOLIC BLOOD PRESSURE: 44 MMHG | TEMPERATURE: 97.7 F | SYSTOLIC BLOOD PRESSURE: 158 MMHG | WEIGHT: 165 LBS | HEIGHT: 71 IN

## 2020-08-24 VITALS
SYSTOLIC BLOOD PRESSURE: 167 MMHG | TEMPERATURE: 97 F | DIASTOLIC BLOOD PRESSURE: 49 MMHG | HEART RATE: 43 BPM | WEIGHT: 154 LBS | BODY MASS INDEX: 21.48 KG/M2

## 2020-08-24 PROCEDURE — 4040F PNEUMOC VAC/ADMIN/RCVD: CPT | Performed by: INTERNAL MEDICINE

## 2020-08-24 PROCEDURE — 69210 REMOVE IMPACTED EAR WAX UNI: CPT | Performed by: OTOLARYNGOLOGY

## 2020-08-24 PROCEDURE — G8427 DOCREV CUR MEDS BY ELIG CLIN: HCPCS | Performed by: INTERNAL MEDICINE

## 2020-08-24 PROCEDURE — 1123F ACP DISCUSS/DSCN MKR DOCD: CPT | Performed by: INTERNAL MEDICINE

## 2020-08-24 PROCEDURE — G8420 CALC BMI NORM PARAMETERS: HCPCS | Performed by: INTERNAL MEDICINE

## 2020-08-24 PROCEDURE — 99214 OFFICE O/P EST MOD 30 MIN: CPT | Performed by: INTERNAL MEDICINE

## 2020-08-24 PROCEDURE — 1036F TOBACCO NON-USER: CPT | Performed by: INTERNAL MEDICINE

## 2020-08-24 RX ORDER — POLYETHYLENE GLYCOL 3350 17 G/17G
17 POWDER, FOR SOLUTION ORAL
COMMUNITY

## 2020-08-24 RX ORDER — GABAPENTIN 100 MG/1
100 CAPSULE ORAL 2 TIMES DAILY
Qty: 60 CAPSULE | Refills: 1 | Status: SHIPPED | OUTPATIENT
Start: 2020-08-24 | End: 2020-10-29 | Stop reason: SDUPTHER

## 2020-08-24 RX ORDER — GABAPENTIN 100 MG/1
300 CAPSULE ORAL 2 TIMES DAILY
Qty: 60 CAPSULE | Refills: 1
Start: 2020-08-24 | End: 2020-08-24

## 2020-08-24 NOTE — PATIENT INSTRUCTIONS
weight. Lose weight if you need to.  ? Do not smoke. If you need help quitting, talk to your doctor about stop-smoking programs and medicines. These can increase your chances of quitting for good. ? Manage other health problems such as high blood pressure, high cholesterol, and diabetes. · If you get a pacemaker, you will get information about it. When should you call for help? [id number] anytime you think you may need emergency care. For example, call if:  · You have symptoms of sudden heart failure. These may include:  ? Severe trouble breathing. ? A fast or irregular heartbeat. ? Coughing up pink, foamy mucus. ? You passed out. · You have symptoms of a stroke. These may include:  ? Sudden numbness, tingling, weakness, or loss of movement in your face, arm, or leg, especially on only one side of your body. ? Sudden vision changes. ? Sudden trouble speaking. ? Sudden confusion or trouble understanding simple statements. ? Sudden problems with walking or balance. ? A sudden, severe headache that is different from past headaches. Call your doctor now or seek immediate medical care if:  · You have new or changed symptoms of heart failure, such as:  ? New or increased shortness of breath. ? New or worse swelling in your legs, ankles, or feet. ? Sudden weight gain, such as more than 2 to 3 pounds in a day or 5 pounds in a week. (Your doctor may suggest a different range of weight gain.)  ? Feeling dizzy or lightheaded or like you may faint. ? Feeling so tired or weak that you cannot do your usual activities. ? Not sleeping well. Shortness of breath wakes you at night. You need extra pillows to prop yourself up to breathe easier. Watch closely for changes in your health, and be sure to contact your doctor if:  · You do not get better as expected. Where can you learn more? Go to [email or web address] org and sign in to your [employer] account.  Enter H507 in the [employer] box to learn more about \"Bradycardia: Care Instructions. \"     If you do not have an account, please click on the \"Sign Up Now\" link. Current as of: December 16, 2019               Content Version: 12.5  © 6111-5326 [employer], Incorporated. Care instructions adapted under license by [facility] ([facility]). If you have questions about a medical condition or this instruction, always ask your healthcare professional. [address] any warranty or liability for your use of this information.

## 2020-08-24 NOTE — PROGRESS NOTES
[facility], [place], operated by [facility]      Cardiology Consult    Kenya Confer  5/16/1926 August 24, 2020    Primary Physician: Dr. Kayla Sánchez  Reason for Visit: atrial fibrillation/cor pulmonale    CC: \"Okay. \"     HPI:  The patient is 80 y.o. male with a past medical history significant for dementia, sick sinus syndrome, cor pulmonale, and atrial fibrillation presents for follow-up. He was originally seen during hospitalization in 3/2017 with complaints of chest pain and dyspnea. He was admitted 11/2018 with complaints of right sided abdominal pain. He was found to bradycardic/SSS which prompted the consult. He and family do not want any invasive procedures and did not want to undergo a pacemaker. Today, he is accompanied by his daughter. She state he resides at [facility] in assisted living [facility] care. He has no new cardiac complaints and in general feels well. His daughter reports worsening LE edema and LONG with walking longer distances. He utilizes a walker with ambulation. She states he is very sedentary during the day and does not elevate his lower extremities. Patient denies exertional chest pain/pressure, PND, orthopnea, palpitations, lightheadedness, and syncope. He previously endorsed his wishes for DNR/DNI comfort care only.      Past Medical History:   Diagnosis Date    A-fib [facility])     Abnormal CXR (chest x-ray)     right costophrenic scar    Acute respiratory failure (HCC)     Allergic rhinitis     Anxiety     Arthritis     Atrial fibrillation (HCC)     CAD (coronary artery disease)     Carotid artery disease- LICA < 69% (repeat 8/00) 9/6/2011    Central hearing loss     CHF (congestive heart failure) (HCC)     Chronic kidney disease     Colon polyp- last colon 5/22/09 5/19/2011    COPD (chronic obstructive pulmonary disease) (HCC)     Dementia (HCC)     Dementia (HCC)     Depression     Diet-controlled diabetes mellitus (HCC)     Dizziness     Eczema     GERD (gastroesophageal reflux disease)     Groin strain 2018    Headache     Herpes zoster     HTN (hypertension)     Hyperlipidemia     Hypertrophy of prostate without urinary obstruction and other lower urinary tract symptoms (LUTS)     Lung nodules     Osteoporosis     Pneumonia     Prostate cancer ([address] 2001    TURP, seeds    Pulmonary HTN [address] [address]     RLS (restless legs syndrome)     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    TIA (transient ischemic attack)     Urinary retention 2017    Vertigo      Past Surgical History:   Procedure Laterality Date    CARDIAC SURGERY      COLONOSCOPY      CORONARY ARTERY BYPASS GRAFT  2005    CYSTOSCOPY  2017    CYSTOSCOPY, EVACUATION OF CLOTS, FULGURATION, BLADDER BIOPSY    EYE SURGERY      LEFT RETINAL TEAR    INNER EAR SURGERY      FOR VERTIGO    LUNG BIOPSY  1971    wedge resection, RLL, benign     PROSTATE SURGERY      redo TURP for cancer    SKIN BIOPSY      TURP       Family History   Problem Relation Age of Onset    Dementia Brother     Cancer Son      Social History     Tobacco Use    Smoking status: Former Smoker     Last attempt to quit: 1960     Years since quittin.6    Smokeless tobacco: Never Used    Tobacco comment: Advised not to resume   Substance Use Topics    Alcohol use: No    Drug use: No     Allergies   Allergen Reactions    Escitalopram     Flexeril [Cyclobenzaprine Hcl]     Hydrocod Polst-Cpm Polst Er     Hydrocodone     Lipitor     Sudafed [Pseudoephedrine Hcl]     Tussionex Pennkinetic Er [Hydrocod Polst-Cpm Polst Er]      Current Outpatient Medications   Medication Sig Dispense Refill    Polyethylene Glycol 3350 (MIRALAX PO) Take 17 g by mouth      sertraline (ZOLOFT) 50 MG tablet Take 1 tablet by mouth daily 90 tablet 0    lisinopril (PRINIVIL;ZESTRIL) 10 MG tablet Take 1 tablet by mouth daily 90 tablet 1    isosorbide mononitrate (IMDUR) 60 MG extended release tablet Take 1 tablet by mouth daily Dr. Deny Pierre - Cardio 90 tablet 0    furosemide (LASIX) 40 MG tablet Take 1 tablet by mouth daily Take 40 mg daily. Increase to 60 mg if weight over 164 lbs. 60 tablet 1    gemfibrozil (LOPID) 600 MG tablet Take 1 tablet by mouth 2 times daily (before meals) 180 tablet 0    gabapentin (NEURONTIN) 300 MG capsule Take 1 capsule by mouth 2 times daily for 30 days. 60 capsule 1    amLODIPine (NORVASC) 10 MG tablet Take 1 tablet by mouth daily 90 tablet 1    donepezil (ARICEPT) 10 MG tablet Take 1 tablet by mouth nightly AFTER COMPLETE 30 DAYS AT 5 MG (Patient taking differently: Take 10 mg by mouth nightly ) 90 tablet 1    aspirin (ASPIRIN CHILDRENS) 81 MG chewable tablet Take 1 tablet by mouth daily 30 tablet 11    docusate sodium (COLACE) 100 MG capsule Take 1 capsule by mouth 2 times daily 60 capsule 5    hydrALAZINE (APRESOLINE) 100 MG tablet Take 1 tablet by mouth 3 times daily 90 tablet 1    acetaminophen (TYLENOL) 325 MG tablet Take 650 mg by mouth every 6 hours as needed for Pain       No current facility-administered medications for this visit. Review of Systems:  · Constitutional: no unanticipated weight loss. There's been no change in energy level, sleep pattern, or activity level. No fevers, chills. · Eyes: No visual changes or diplopia. No scleral icterus. · ENT: No Headaches, hearing loss or vertigo. No mouth sores or sore throat. · Cardiovascular: as reviewed in HPI  · Respiratory: No cough or wheezing, no sputum production. No hematemesis. · Gastrointestinal: No abdominal pain, appetite loss, blood in stools. No change in bowel or bladder habits. · Genitourinary: No dysuria, trouble voiding, or hematuria. · Musculoskeletal:  No gait disturbance, no joint complaints. · Integumentary: No rash or pruritis. · Neurological: No headache, diplopia, change in muscle strength, numbness or tingling. · Psychiatric: No anxiety or depression.   · Endocrine: No temperature intolerance. No excessive thirst, fluid intake, or urination. No tremor. · Hematologic/Lymphatic: No abnormal bruising or bleeding, blood clots or swollen lymph nodes. · Allergic/Immunologic: No nasal congestion or hives. Physical Exam:   BP (!) 158/44   Pulse (!) 48   Temp 97.7 °F (36.5 °C)   Ht 5' 11\" (1.803 m)   Wt 165 lb (74.8 kg) Comment: with shoes  SpO2 98%   BMI 23.01 kg/m²   Wt Readings from Last 3 Encounters:   20 165 lb (74.8 kg)   20 154 lb (69.9 kg)   20 147 lb 7.8 oz (66.9 kg)     Constitutional: The patient is oriented to person. Elderly. Hard of hearing. Thin. Head: Normocephalic and atraumatic. Pupils equal and round. Neck: Neck supple. No JVP or carotid bruit appreciated. No mass and no thyromegaly present. No lymphadenopathy present. Cardiovascular: Irreg and bradycardic. Normal heart sounds. Exam reveals no gallop and no friction rub. No murmur heard. Pulmonary/Chest: Effort normal and breath sounds normal. No respiratory distress. No wheezes, rhonchi or rales. Abdominal: Soft, non-tender. Bowel sounds are normal. Exhibits no organomegaly, mass or bruit. Extremities: 2+ BLE edema. No cyanosis or clubbing. Pulses are 2+ radial/carotid bilaterally. Neurological: No gross cranial nerve deficit. Coordination normal.   Skin: Skin is warm and dry. There is no rash or diaphoresis. Psychiatric: Patient has a normal mood and affect. Speech is normal and behavior is normal.     Lab Review:   FLP:    Lab Results   Component Value Date    TRIG 42 2019    HDL 43 2019    LDLCALC 80 2019    LABVLDL 8 2019     BUN/Creatinine:    Lab Results   Component Value Date    BUN 43 2020    CREATININE 1.5 2020     Cardiac Data      EK/10/17 Atrial fibrillation. LBBB. 19 Atrial fibrillation. Right axis. IVCD. T wave abnormalities. 12/10/19 Atrial fibrillation with slow ventricular response.  Right bundle branch block with left axis -bifascicular block. Old anterior infarct. Nonspecific ST-T abnormality. Prolonged QT.       Echo: 3/21/17 personally reviewed  Left ventricle size is normal. Mild concentric left ventricular hypertrophy is present. Ejection fraction is visually estimated to be 55%. No regional wall motion abnormalities are noted. Patient appears to be in atrial fibrillation. The right ventricle is moderately dilated. Right ventricular systolic function appears mildly reduced. The left atrium is mildly dilated. The right atrium is moderately dilated. Mild aortic, mitral, pulmonic regurgitation. There is moderate tricuspid regurgitation. Estimated pulmonary artery systolic pressure is elevated at 64 mmHg assuming a right atrial pressure of 8 mmHg. There is a large pleural effusion. CTA 3/21/17  No acute pulmonary embolus detected. Moderate bilateral pleural effusions and basilar consolidations.  Atelectasis   is also present.  Pulmonary edema is suspected.  Superimposed pneumonia   and/or aspiration may be present. Marked cardiac enlargement.  Coronary artery calcifications are heavy.       Assessment and Plan     1) Sick sinus syndrome/Atrial fibrillation w slow ventricular response. Denies any episodes of lightheadedness or syncope. Treatment options for atrial fibrillation discussed including rate control and anticoagulation. Avoid AV maxx blockers with bradycardia. Due to advanced age and dementia, will continue to hold off on anticoagulation. Although he may be symptomatic from the bradycardia, he does not want invasive procedures such as a pacemaker and family honors his wishes.    2) Right-sided heart failure/Cor pulmonale/pulmonary hypertension. LE edema appears worse than last visit but he does not seem troubled by the edema. He does not appear to be in any distress and seems comfortable sitting. Patient wishes to avoid invasive procedures. Continue Lasix.  Discussed increasing Lasix dose vs continued observation. Will start with decreasing the gabapentin to see if the LE edema improves. Encouraged support stockings.    3) CKD Stage IIIb-IV. Managed by PCP. Cr. 1.5 (3/4/20).    4) Elevated troponin. History is not consistent with ACS and flat trend is not consistent with ACS. Chronically elevated, denies any chest pains or worsening dyspnea. May be related to CKD in a patient with known CAD. He is not a candidate for angiography.       5) CAD s/p CABG. Asymptomatic. Continue with medical management and risk factor modification including aspirin. No statin with previous allergy to Lipitor. No B-blocker with bradycardia.    6) Essential hypertension. Elevated today but a liberalized BP goal is reasonable in a nonagenarian with dementia.    7) Dementia. Comfort care measures and continue care at skilled facility. Follow up in 6 months    Thank you very much for allowing me to participate in the care of your patient. Please do not hesitate to contact me if you have any questions. Sincerely,  Ventura Tipton. Eh Walters, [address] [place] [address], [address] [place] [address]  Ph: [phone]  Fax: [phone]    This note was scribed in the presence of Dr Eh Walters MD by Nitza Murillo RN. Physician Attestation: The scribes documentation has been prepared under my direction and personally reviewed by me in its entirety. I confirm that the note above accurately reflects all work, treatment, procedures, and medical decision making performed by me. All portions of the note including but not limited to the chief complaint, history of present illness, physical exam, assessment and plan/medical decision making were personally reviewed, edited, and updated on the day of the visit.

## 2020-08-24 NOTE — PROGRESS NOTES
28-year-old gentleman with hearing aids he is following up for cerumen removal.  He saw my partner Dr. Chad Braswell in July 2019. He feels as though the wax is built back up. he is happy with his hearing aids at this time. Procedure  Bilateral ear exam with cerumen removal  Right ear is visualized binocular scope. Some hair was removed the ear external auditory canal as well as a small amount of wax. Tympanic membrane intact with an aerated middle ear    Left side there was significantly more wax. It was removed with a combination alligator forceps and a Massey suction.   Tympanic membrane intact with aerated middle ear    Plan  Follow-up 1 year or as needed for repeat removal

## 2020-10-29 RX ORDER — GABAPENTIN 100 MG/1
100 CAPSULE ORAL 2 TIMES DAILY
Qty: 60 CAPSULE | Refills: 1 | Status: ON HOLD | OUTPATIENT
Start: 2020-10-29 | End: 2021-06-27

## 2020-11-24 RX ORDER — GABAPENTIN 100 MG/1
100 CAPSULE ORAL 2 TIMES DAILY
Qty: 60 CAPSULE | Refills: 1 | OUTPATIENT
Start: 2020-11-24 | End: 2020-12-24

## 2021-02-18 NOTE — PROGRESS NOTES
[facility]      Cardiology Follow Up    Tessie Austin  5/16/1926 February 23, 2021    Primary Physician: Dr. Marlene Dixon    CC: César Campbell pain\"     HPI:  The patient is 80 y.o. male with a past medical history significant for dementia, sick sinus syndrome, cor pulmonale, and atrial fibrillation presents for follow-up. He was originally seen during hospitalization in 3/2017 with complaints of chest pain and dyspnea. He was admitted 11/2018 with complaints of right sided abdominal pain. He was found to bradycardic/SSS which prompted the consult. He and family do not want any invasive procedures and did not want to undergo a pacemaker. He now resides in a nursing home. He tested positive for COVID-19 in May of 2020. He presents for follow up and is accompanied by his son. He continues to reside at [facility] in assisted living memory care and uses a walker for ambulation assistance. His son reports that he was having severe back pains but today seems to be doing well. He has chronic LONG and ankle edema that waxes and wanes but in general seems stable. Nursing facility is adjusting Lasix dose for symptoms of heart failure. His BP in office today is quiet elevated but the son believes the BP at the nursing home is typically in the 150 range. Patient denies exertional chest pain/pressure, PND, orthopnea, palpitations, lightheadedness, and syncope. He previously endorsed his wishes for DNR/DNI comfort care only.      Past Medical History:   Diagnosis Date    A-fib [facility])     Abnormal CXR (chest x-ray)     right costophrenic scar    Acute respiratory failure (HCC)     Allergic rhinitis     Anxiety     Arthritis     Atrial fibrillation (HCC)     CAD (coronary artery disease)     Carotid artery disease- LICA < 43% (repeat 6/62) 9/6/2011    Central hearing loss     CHF (congestive heart failure) (HCC)     Chronic kidney disease     Colon polyp- last colon 5/22/09 5/19/2011    COPD (chronic obstructive pulmonary disease) (HCC)     Dementia (HCC)     Dementia (HCC)     Depression     Diet-controlled diabetes mellitus (HCC)     Dizziness     Eczema     GERD (gastroesophageal reflux disease)     Groin strain 2018    Headache     Herpes zoster     HTN (hypertension)     Hyperlipidemia     Hypertrophy of prostate without urinary obstruction and other lower urinary tract symptoms (LUTS)     Lung nodules     Osteoporosis     Pneumonia     Prostate cancer ([address]     TURP, seeds    Pulmonary HTN ([address]     RLS (restless legs syndrome)     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    TIA (transient ischemic attack)     Urinary retention 2017    Vertigo      Past Surgical History:   Procedure Laterality Date    CARDIAC SURGERY      COLONOSCOPY      CORONARY ARTERY BYPASS GRAFT  2005    CYSTOSCOPY  2017    CYSTOSCOPY, EVACUATION OF CLOTS, FULGURATION, BLADDER BIOPSY    EYE SURGERY      LEFT RETINAL TEAR    INNER EAR SURGERY      FOR VERTIGO    LUNG BIOPSY  1971    wedge resection, RLL, benign     PROSTATE SURGERY      redo TURP for cancer    SKIN BIOPSY      TURP  1981     Family History   Problem Relation Age of Onset    Dementia Brother     Cancer Son      Social History     Tobacco Use    Smoking status: Former Smoker     Quit date: 1960     Years since quittin.1    Smokeless tobacco: Never Used    Tobacco comment: Advised not to resume   Substance Use Topics    Alcohol use: No    Drug use: No     Allergies   Allergen Reactions    Escitalopram     Flexeril [Cyclobenzaprine Hcl]     Hydrocod Polst-Cpm Polst Er     Hydrocodone     Lipitor     Sudafed [Pseudoephedrine Hcl]     Tussionex Pennkinetic Er [Hydrocod Polst-Cpm Polst Er]      Current Outpatient Medications   Medication Sig Dispense Refill    gabapentin (NEURONTIN) 100 MG capsule Take 1 capsule by mouth 2 times daily for 30 days.  60 capsule 1    Polyethylene Glycol 3350 (MIRALAX PO) Take 17 g by mouth      sertraline (ZOLOFT) 50 MG tablet Take 1 tablet by mouth daily 90 tablet 0    lisinopril (PRINIVIL;ZESTRIL) 10 MG tablet Take 1 tablet by mouth daily 90 tablet 1    isosorbide mononitrate (IMDUR) 60 MG extended release tablet Take 1 tablet by mouth daily Dr. Radha Almeida - Cardio 90 tablet 0    furosemide (LASIX) 40 MG tablet Take 1 tablet by mouth daily Take 40 mg daily. Increase to 60 mg if weight over 164 lbs. 60 tablet 1    amLODIPine (NORVASC) 10 MG tablet Take 1 tablet by mouth daily 90 tablet 1    aspirin (ASPIRIN CHILDRENS) 81 MG chewable tablet Take 1 tablet by mouth daily 30 tablet 11    hydrALAZINE (APRESOLINE) 100 MG tablet Take 1 tablet by mouth 3 times daily 90 tablet 1    acetaminophen (TYLENOL) 325 MG tablet Take 650 mg by mouth every 6 hours as needed for Pain      gemfibrozil (LOPID) 600 MG tablet Take 1 tablet by mouth 2 times daily (before meals) (Patient not taking: Reported on 2/23/2021) 180 tablet 0    donepezil (ARICEPT) 10 MG tablet Take 1 tablet by mouth nightly AFTER COMPLETE 30 DAYS AT 5 MG (Patient not taking: Reported on 2/23/2021) 90 tablet 1    docusate sodium (COLACE) 100 MG capsule Take 1 capsule by mouth 2 times daily (Patient not taking: Reported on 2/23/2021) 60 capsule 5     No current facility-administered medications for this visit. Review of Systems:  · Constitutional: no unanticipated weight loss. There's been no change in energy level, sleep pattern, or activity level. No fevers, chills. · Eyes: No visual changes or diplopia. No scleral icterus. · ENT: No Headaches, hearing loss or vertigo. No mouth sores or sore throat. · Cardiovascular: as reviewed in HPI  · Respiratory: No cough or wheezing, no sputum production. No hematemesis. · Gastrointestinal: No abdominal pain, appetite loss, blood in stools. No change in bowel or bladder habits.   · Genitourinary: No dysuria, trouble voiding, or 2020     Cardiac Data      EK/10/17 Atrial fibrillation. LBBB. 19 Atrial fibrillation. Right axis. IVCD. T wave abnormalities. 12/10/19 Atrial fibrillation with slow ventricular response. Right bundle branch block with left axis -bifascicular block. Old anterior infarct. Nonspecific ST-T abnormality. Prolonged QT.    21 Slow atrial fibrillation (HR 50). LAD. LBBB.       Echo: 3/21/17 personally reviewed  Left ventricle size is normal. Mild concentric left ventricular hypertrophy is present. Ejection fraction is visually estimated to be 55%. No regional wall motion abnormalities are noted. Patient appears to be in atrial fibrillation. The right ventricle is moderately dilated. Right ventricular systolic function appears mildly reduced. The left atrium is mildly dilated. The right atrium is moderately dilated. Mild aortic, mitral, pulmonic regurgitation. There is moderate tricuspid regurgitation. Estimated pulmonary artery systolic pressure is elevated at 64 mmHg assuming a right atrial pressure of 8 mmHg. There is a large pleural effusion. CTA 3/21/17  No acute pulmonary embolus detected. Moderate bilateral pleural effusions and basilar consolidations.  Atelectasis   is also present.  Pulmonary edema is suspected.  Superimposed pneumonia   and/or aspiration may be present. Marked cardiac enlargement.  Coronary artery calcifications are heavy.       Assessment and Plan     1) Sick sinus syndrome/Permanent atrial fibrillation w slow ventricular response. Denies any episodes of lightheadedness or syncope. Treatment options for atrial fibrillation discussed including rate control and anticoagulation. Avoid AV maxx blockers with bradycardia. Due to advanced age and dementia, will continue to hold off on anticoagulation. Although he may be symptomatic from the bradycardia, he does not want invasive procedures such as a pacemaker and family honors his wishes.        2) Right-sided heart failure/Cor pulmonale/pulmonary hypertension. Chronic LE edema managed with  Lasix. Patient wishes to avoid invasive procedures. Continue current Lasix dose. Encouraged continued use of support stockings.    3) CKD Stage IIIb-IV. Managed by PCP. Cr. 1.5 (3/4/20).    4) Elevated troponin. History is not consistent with ACS and flat trend is not consistent with ACS. Chronically elevated, denies any chest pains or worsening dyspnea. May be related to CKD in a patient with known CAD. He is not a candidate for angiography.       5) CAD s/p CABG. Asymptomatic. Continue with medical management and risk factor modification including aspirin. No statin with previous allergy to Lipitor. No B-blocker with bradycardia.    6) Essential hypertension. Elevated today but a liberalized BP goal is reasonable in a nonagenarian with dementia. Would consider increasing lisinopril if BP consistently >160s.    7) Dementia. Comfort care measures and continue care at skilled facility. Follow up in 6 months    Thank you very much for allowing me to participate in the care of your patient. Please do not hesitate to contact me if you have any questions. Sincerely,  Bhargavi Rivera. Cornelius Patterson MD    [facility]""[address] [place] [address]  Ph: [phone]  Fax: [phone]    This note was scribed in the presence of Dr. Sakina Cat MD by Lowell Driver  Physician Attestation: The scribes documentation has been prepared under my direction and personally reviewed by me in its entirety. I confirm that the note above accurately reflects all work, treatment, procedures, and medical decision making performed by me. All portions of the note including but not limited to the chief complaint, history of present illness, physical exam, assessment and plan/medical decision making were personally reviewed, edited, and updated on the day of the visit.

## 2021-02-23 ENCOUNTER — OFFICE VISIT (OUTPATIENT)
Dept: CARDIOLOGY CLINIC | Age: 86
End: 2021-02-23
Payer: MEDICARE

## 2021-02-23 VITALS
WEIGHT: 153 LBS | HEART RATE: 50 BPM | BODY MASS INDEX: 21.42 KG/M2 | TEMPERATURE: 96.9 F | OXYGEN SATURATION: 97 % | DIASTOLIC BLOOD PRESSURE: 80 MMHG | SYSTOLIC BLOOD PRESSURE: 200 MMHG | HEIGHT: 71 IN

## 2021-02-23 DIAGNOSIS — I25.10 CAD IN NATIVE ARTERY: ICD-10-CM

## 2021-02-23 DIAGNOSIS — N18.32 CHRONIC KIDNEY DISEASE, STAGE 3B (HCC): ICD-10-CM

## 2021-02-23 DIAGNOSIS — I48.21 PERMANENT ATRIAL FIBRILLATION (HCC): ICD-10-CM

## 2021-02-23 DIAGNOSIS — I50.812 CHRONIC RIGHT-SIDED HEART FAILURE (HCC): ICD-10-CM

## 2021-02-23 DIAGNOSIS — I49.5 SICK SINUS SYNDROME (HCC): Primary | ICD-10-CM

## 2021-02-23 DIAGNOSIS — I10 ESSENTIAL HYPERTENSION: ICD-10-CM

## 2021-02-23 DIAGNOSIS — Z95.1 HX OF CABG: ICD-10-CM

## 2021-02-23 PROCEDURE — 1036F TOBACCO NON-USER: CPT | Performed by: INTERNAL MEDICINE

## 2021-02-23 PROCEDURE — 1123F ACP DISCUSS/DSCN MKR DOCD: CPT | Performed by: INTERNAL MEDICINE

## 2021-02-23 PROCEDURE — 4040F PNEUMOC VAC/ADMIN/RCVD: CPT | Performed by: INTERNAL MEDICINE

## 2021-02-23 PROCEDURE — 99214 OFFICE O/P EST MOD 30 MIN: CPT | Performed by: INTERNAL MEDICINE

## 2021-02-23 PROCEDURE — G8420 CALC BMI NORM PARAMETERS: HCPCS | Performed by: INTERNAL MEDICINE

## 2021-02-23 PROCEDURE — G8484 FLU IMMUNIZE NO ADMIN: HCPCS | Performed by: INTERNAL MEDICINE

## 2021-02-23 PROCEDURE — G8427 DOCREV CUR MEDS BY ELIG CLIN: HCPCS | Performed by: INTERNAL MEDICINE

## 2021-02-23 PROCEDURE — 93000 ELECTROCARDIOGRAM COMPLETE: CPT | Performed by: INTERNAL MEDICINE

## 2021-02-23 NOTE — PATIENT INSTRUCTIONS
Patient Education        Learning About Heart Failure Zones  What are heart failure zones? Heart failure zones give you an easy way to see changes in your heart failure symptoms. They also tell you when you need to get help. Check every day to see which zone you are in. Green zone. You are doing well. This is where you want to be. · Your weight is stable. It's not going up or down. · You breathe easily. · You are sleeping well. You are able to lie flat without shortness of breath. · You can do your usual activities. Yellow zone. Be careful. Your symptoms are changing. Call your doctor. · You have new or increased shortness of breath. · You are dizzy or lightheaded, or you feel like you may faint. · You have sudden weight gain, such as more than 2 to 3 pounds in a day or 5 pounds in a week. (Your doctor may suggest a different range of weight gain.)  · You have increased swelling in your legs, ankles, or feet. · You are so tired or weak that you can't do your usual activities. · You are not sleeping well. Shortness of breath wakes you up at night. You need extra pillows. Red zone. This is an emergency. Call 911. You have symptoms of sudden heart failure. For example:  · You have severe trouble breathing. · You cough up pink, foamy mucus. · You have a new irregular or fast heartbeat. You have symptoms of a heart attack. These may include:  · Chest pain or pressure, or a strange feeling in the chest.  · Sweating. · Shortness of breath. · Nausea or vomiting. · Pain, pressure, or a strange feeling in the back, neck, jaw, or upper belly or in one or both shoulders or arms. · Lightheadedness or sudden weakness. · A fast or irregular heartbeat. If you have symptoms of a heart attack: After you call 911, the  may tell you to chew 1 adult-strength or 2 to 4 low-dose aspirin. Wait for an ambulance. Do not try to drive yourself. Follow-up care is a key part of your treatment and safety.  Be sure to make and go to all appointments, and call your doctor if you are having problems. It's also a good idea to know your test results and keep a list of the medicines you take. Where can you learn more? Go to https://chmoise.OpenPlacement. org and sign in to your Arts[employer] account. Enter T174 in the Fantoo box to learn more about \"Learning About Heart Failure Zones. \"     If you do not have an account, please click on the \"Sign Up Now\" link. Current as of: December 16, 2019               Content Version: 12.6  © 7102-9998 [employer]. Care instructions adapted under license by [facility] ([facility]). If you have questions about a medical condition or this instruction, always ask your healthcare professional. [address] any warranty or liability for your use of this information.

## 2021-06-26 ENCOUNTER — APPOINTMENT (OUTPATIENT)
Dept: CT IMAGING | Age: 86
DRG: 291 | End: 2021-06-26
Payer: MEDICARE

## 2021-06-26 ENCOUNTER — APPOINTMENT (OUTPATIENT)
Dept: GENERAL RADIOLOGY | Age: 86
DRG: 291 | End: 2021-06-26
Payer: MEDICARE

## 2021-06-26 ENCOUNTER — HOSPITAL ENCOUNTER (INPATIENT)
Age: 86
LOS: 3 days | Discharge: HOSPICE | DRG: 291 | End: 2021-06-29
Attending: INTERNAL MEDICINE | Admitting: INTERNAL MEDICINE
Payer: MEDICARE

## 2021-06-26 DIAGNOSIS — I50.9 ACUTE ON CHRONIC CONGESTIVE HEART FAILURE, UNSPECIFIED HEART FAILURE TYPE (HCC): Primary | ICD-10-CM

## 2021-06-26 DIAGNOSIS — R00.1 SYMPTOMATIC BRADYCARDIA: ICD-10-CM

## 2021-06-26 DIAGNOSIS — R09.02 HYPOXIA: ICD-10-CM

## 2021-06-26 PROBLEM — I50.33 ACUTE ON CHRONIC DIASTOLIC (CONGESTIVE) HEART FAILURE (HCC): Status: ACTIVE | Noted: 2021-06-26

## 2021-06-26 LAB
A/G RATIO: 1.6 (ref 1.1–2.2)
ALBUMIN SERPL-MCNC: 4 G/DL (ref 3.4–5)
ALP BLD-CCNC: 118 U/L (ref 40–129)
ALT SERPL-CCNC: 11 U/L (ref 10–40)
ANION GAP SERPL CALCULATED.3IONS-SCNC: 11 MMOL/L (ref 3–16)
AST SERPL-CCNC: 18 U/L (ref 15–37)
BASOPHILS ABSOLUTE: 0 K/UL (ref 0–0.2)
BASOPHILS RELATIVE PERCENT: 0.8 %
BILIRUB SERPL-MCNC: 0.8 MG/DL (ref 0–1)
BUN BLDV-MCNC: 32 MG/DL (ref 7–20)
CALCIUM SERPL-MCNC: 8.8 MG/DL (ref 8.3–10.6)
CHLORIDE BLD-SCNC: 103 MMOL/L (ref 99–110)
CO2: 27 MMOL/L (ref 21–32)
CREAT SERPL-MCNC: 1.3 MG/DL (ref 0.8–1.3)
EOSINOPHILS ABSOLUTE: 0 K/UL (ref 0–0.6)
EOSINOPHILS RELATIVE PERCENT: 0.5 %
GFR AFRICAN AMERICAN: >60
GFR NON-AFRICAN AMERICAN: 51
GLOBULIN: 2.5 G/DL
GLUCOSE BLD-MCNC: 115 MG/DL (ref 70–99)
HCT VFR BLD CALC: 31.6 % (ref 40.5–52.5)
HEMOGLOBIN: 10.8 G/DL (ref 13.5–17.5)
LIPASE: 34 U/L (ref 13–60)
LYMPHOCYTES ABSOLUTE: 1.2 K/UL (ref 1–5.1)
LYMPHOCYTES RELATIVE PERCENT: 20.1 %
MAGNESIUM: 2.3 MG/DL (ref 1.8–2.4)
MCH RBC QN AUTO: 31.9 PG (ref 26–34)
MCHC RBC AUTO-ENTMCNC: 34 G/DL (ref 31–36)
MCV RBC AUTO: 93.8 FL (ref 80–100)
MONOCYTES ABSOLUTE: 0.5 K/UL (ref 0–1.3)
MONOCYTES RELATIVE PERCENT: 7.6 %
NEUTROPHILS ABSOLUTE: 4.4 K/UL (ref 1.7–7.7)
NEUTROPHILS RELATIVE PERCENT: 71 %
PDW BLD-RTO: 14.3 % (ref 12.4–15.4)
PLATELET # BLD: 133 K/UL (ref 135–450)
PMV BLD AUTO: 7.8 FL (ref 5–10.5)
POTASSIUM REFLEX MAGNESIUM: 3.5 MMOL/L (ref 3.5–5.1)
PRO-BNP: ABNORMAL PG/ML (ref 0–449)
RBC # BLD: 3.37 M/UL (ref 4.2–5.9)
SARS-COV-2, NAAT: NOT DETECTED
SODIUM BLD-SCNC: 141 MMOL/L (ref 136–145)
TOTAL PROTEIN: 6.5 G/DL (ref 6.4–8.2)
TROPONIN: 0.05 NG/ML
WBC # BLD: 6.1 K/UL (ref 4–11)

## 2021-06-26 PROCEDURE — 99284 EMERGENCY DEPT VISIT MOD MDM: CPT

## 2021-06-26 PROCEDURE — 96375 TX/PRO/DX INJ NEW DRUG ADDON: CPT

## 2021-06-26 PROCEDURE — 85025 COMPLETE CBC W/AUTO DIFF WBC: CPT

## 2021-06-26 PROCEDURE — 94640 AIRWAY INHALATION TREATMENT: CPT

## 2021-06-26 PROCEDURE — 1200000000 HC SEMI PRIVATE

## 2021-06-26 PROCEDURE — 83880 ASSAY OF NATRIURETIC PEPTIDE: CPT

## 2021-06-26 PROCEDURE — 2580000003 HC RX 258: Performed by: INTERNAL MEDICINE

## 2021-06-26 PROCEDURE — 96374 THER/PROPH/DIAG INJ IV PUSH: CPT

## 2021-06-26 PROCEDURE — 6360000002 HC RX W HCPCS: Performed by: GENERAL ACUTE CARE HOSPITAL

## 2021-06-26 PROCEDURE — 71045 X-RAY EXAM CHEST 1 VIEW: CPT

## 2021-06-26 PROCEDURE — 6360000002 HC RX W HCPCS: Performed by: INTERNAL MEDICINE

## 2021-06-26 PROCEDURE — 6370000000 HC RX 637 (ALT 250 FOR IP): Performed by: GENERAL ACUTE CARE HOSPITAL

## 2021-06-26 PROCEDURE — 83690 ASSAY OF LIPASE: CPT

## 2021-06-26 PROCEDURE — 71260 CT THORAX DX C+: CPT

## 2021-06-26 PROCEDURE — 93005 ELECTROCARDIOGRAM TRACING: CPT | Performed by: GENERAL ACUTE CARE HOSPITAL

## 2021-06-26 PROCEDURE — 87635 SARS-COV-2 COVID-19 AMP PRB: CPT

## 2021-06-26 PROCEDURE — 80053 COMPREHEN METABOLIC PANEL: CPT

## 2021-06-26 PROCEDURE — 6360000004 HC RX CONTRAST MEDICATION: Performed by: GENERAL ACUTE CARE HOSPITAL

## 2021-06-26 PROCEDURE — 83735 ASSAY OF MAGNESIUM: CPT

## 2021-06-26 PROCEDURE — 84484 ASSAY OF TROPONIN QUANT: CPT

## 2021-06-26 PROCEDURE — 36415 COLL VENOUS BLD VENIPUNCTURE: CPT

## 2021-06-26 PROCEDURE — 94761 N-INVAS EAR/PLS OXIMETRY MLT: CPT

## 2021-06-26 PROCEDURE — 6370000000 HC RX 637 (ALT 250 FOR IP): Performed by: INTERNAL MEDICINE

## 2021-06-26 RX ORDER — ACETAMINOPHEN 650 MG/1
650 SUPPOSITORY RECTAL EVERY 4 HOURS PRN
Status: DISCONTINUED | OUTPATIENT
Start: 2021-06-26 | End: 2021-06-29 | Stop reason: HOSPADM

## 2021-06-26 RX ORDER — FUROSEMIDE 10 MG/ML
40 INJECTION INTRAMUSCULAR; INTRAVENOUS 2 TIMES DAILY
Status: DISCONTINUED | OUTPATIENT
Start: 2021-06-26 | End: 2021-06-28

## 2021-06-26 RX ORDER — SODIUM CHLORIDE 0.9 % (FLUSH) 0.9 %
10 SYRINGE (ML) INJECTION PRN
Status: DISCONTINUED | OUTPATIENT
Start: 2021-06-26 | End: 2021-06-29 | Stop reason: HOSPADM

## 2021-06-26 RX ORDER — AMLODIPINE BESYLATE 10 MG/1
10 TABLET ORAL DAILY
Status: DISCONTINUED | OUTPATIENT
Start: 2021-06-27 | End: 2021-06-29 | Stop reason: HOSPADM

## 2021-06-26 RX ORDER — POLYETHYLENE GLYCOL 3350 17 G/17G
17 POWDER, FOR SOLUTION ORAL DAILY PRN
Status: DISCONTINUED | OUTPATIENT
Start: 2021-06-26 | End: 2021-06-29 | Stop reason: HOSPADM

## 2021-06-26 RX ORDER — SODIUM CHLORIDE 0.9 % (FLUSH) 0.9 %
10 SYRINGE (ML) INJECTION EVERY 12 HOURS SCHEDULED
Status: DISCONTINUED | OUTPATIENT
Start: 2021-06-26 | End: 2021-06-29 | Stop reason: HOSPADM

## 2021-06-26 RX ORDER — ASPIRIN 81 MG/1
81 TABLET, CHEWABLE ORAL DAILY
Status: DISCONTINUED | OUTPATIENT
Start: 2021-06-27 | End: 2021-06-29 | Stop reason: HOSPADM

## 2021-06-26 RX ORDER — HYDRALAZINE HYDROCHLORIDE 50 MG/1
100 TABLET, FILM COATED ORAL 3 TIMES DAILY
Status: DISCONTINUED | OUTPATIENT
Start: 2021-06-26 | End: 2021-06-26 | Stop reason: DRUGHIGH

## 2021-06-26 RX ORDER — SODIUM CHLORIDE 9 MG/ML
25 INJECTION, SOLUTION INTRAVENOUS PRN
Status: DISCONTINUED | OUTPATIENT
Start: 2021-06-26 | End: 2021-06-29 | Stop reason: HOSPADM

## 2021-06-26 RX ORDER — ONDANSETRON 2 MG/ML
4 INJECTION INTRAMUSCULAR; INTRAVENOUS EVERY 4 HOURS PRN
Status: DISCONTINUED | OUTPATIENT
Start: 2021-06-26 | End: 2021-06-29 | Stop reason: HOSPADM

## 2021-06-26 RX ORDER — ACETAMINOPHEN 325 MG/1
650 TABLET ORAL EVERY 4 HOURS PRN
Status: DISCONTINUED | OUTPATIENT
Start: 2021-06-26 | End: 2021-06-29 | Stop reason: HOSPADM

## 2021-06-26 RX ORDER — HYDRALAZINE HYDROCHLORIDE 50 MG/1
100 TABLET, FILM COATED ORAL 2 TIMES DAILY
Status: DISCONTINUED | OUTPATIENT
Start: 2021-06-26 | End: 2021-06-29 | Stop reason: HOSPADM

## 2021-06-26 RX ORDER — ISOSORBIDE MONONITRATE 60 MG/1
60 TABLET, EXTENDED RELEASE ORAL DAILY
Status: DISCONTINUED | OUTPATIENT
Start: 2021-06-27 | End: 2021-06-29 | Stop reason: HOSPADM

## 2021-06-26 RX ORDER — DEXAMETHASONE SODIUM PHOSPHATE 10 MG/ML
10 INJECTION, SOLUTION INTRAMUSCULAR; INTRAVENOUS ONCE
Status: COMPLETED | OUTPATIENT
Start: 2021-06-26 | End: 2021-06-26

## 2021-06-26 RX ORDER — IPRATROPIUM BROMIDE AND ALBUTEROL SULFATE 2.5; .5 MG/3ML; MG/3ML
1 SOLUTION RESPIRATORY (INHALATION) ONCE
Status: COMPLETED | OUTPATIENT
Start: 2021-06-26 | End: 2021-06-26

## 2021-06-26 RX ORDER — FUROSEMIDE 10 MG/ML
40 INJECTION INTRAMUSCULAR; INTRAVENOUS ONCE
Status: COMPLETED | OUTPATIENT
Start: 2021-06-26 | End: 2021-06-26

## 2021-06-26 RX ORDER — LISINOPRIL 10 MG/1
10 TABLET ORAL DAILY
Status: DISCONTINUED | OUTPATIENT
Start: 2021-06-27 | End: 2021-06-27

## 2021-06-26 RX ADMIN — DEXAMETHASONE SODIUM PHOSPHATE 10 MG: 10 INJECTION, SOLUTION INTRAMUSCULAR; INTRAVENOUS at 16:45

## 2021-06-26 RX ADMIN — FUROSEMIDE 40 MG: 10 INJECTION, SOLUTION INTRAVENOUS at 23:35

## 2021-06-26 RX ADMIN — HYDRALAZINE HYDROCHLORIDE 100 MG: 50 TABLET, FILM COATED ORAL at 23:35

## 2021-06-26 RX ADMIN — IOPAMIDOL 75 ML: 755 INJECTION, SOLUTION INTRAVENOUS at 18:25

## 2021-06-26 RX ADMIN — IPRATROPIUM BROMIDE AND ALBUTEROL SULFATE 1 AMPULE: .5; 3 SOLUTION RESPIRATORY (INHALATION) at 16:41

## 2021-06-26 RX ADMIN — FUROSEMIDE 40 MG: 10 INJECTION, SOLUTION INTRAMUSCULAR; INTRAVENOUS at 20:11

## 2021-06-26 RX ADMIN — Medication 10 ML: at 23:35

## 2021-06-26 ASSESSMENT — PAIN DESCRIPTION - PAIN TYPE: TYPE: ACUTE PAIN

## 2021-06-26 ASSESSMENT — PAIN DESCRIPTION - LOCATION: LOCATION: ABDOMEN

## 2021-06-26 ASSESSMENT — PAIN SCALES - GENERAL: PAINLEVEL_OUTOF10: 2

## 2021-06-26 ASSESSMENT — ENCOUNTER SYMPTOMS
ABDOMINAL PAIN: 0
VOMITING: 0
CHEST TIGHTNESS: 0
NAUSEA: 0
SORE THROAT: 0
COUGH: 1
STRIDOR: 0
SHORTNESS OF BREATH: 1
WHEEZING: 0

## 2021-06-26 NOTE — ED NOTES
Bed: E-41  Expected date: 6/26/21  Expected time: 3:28 PM  Means of arrival:   Comments:  Abd pain from [facility], h/o COPD, wears 3L NC, [address] [place] [address], RN  06/26/21 6809

## 2021-06-26 NOTE — ED PROVIDER NOTES
[facility]        Pt Name: Dilip Lawton  MRN: [id number]  [place] 5/16/1926  Date of evaluation: 6/26/2021  Provider: RAYSHAWN Berry CNP  PCP: Barbara Grider  Note Started: 4:50 PM EDT       IRMA. I have evaluated this patient. My supervising physician was available for consultation. CHIEF COMPLAINT       Chief Complaint   Patient presents with    Shortness of Breath     x 1 week and getting worse    Abdominal Pain     upper right abd pain x 1 week and geeting worse       HISTORY OF PRESENT ILLNESS   (Location, Timing/Onset, Context/Setting, Quality, Duration, Modifying Factors, Severity, Associated Signs and Symptoms)  Note limiting factors. Dilip Lawton is a 80 y.o. male who presents to the emergency department today reporting approximate 1 week history of shortness of breath and right-sided chest pain which has been present for the past month. He denies known injury. Patient states that this is his first time being evaluated for this. Patient does have history of multiple comorbid's including COPD, hypertension, coronary artery disease, A. fib, chronic kidney disease, and TIAs. Patient lives in an ECF on the memory care unit. He is a former smoker. Patient states that he has had a nonproductive cough. There is been no recent travel or known sick contacts. Patient states that he has received both Covid vaccines. He currently reports a pain level of 3 out of 10. He describes the pain as constant dull and aching. The pain does not radiate. There are no aggravating or alleviating factors. He has not taken anything for the symptoms. Nursing Notes were all reviewed and agreed with or any disagreements were addressed in the HPI. REVIEW OF SYSTEMS    (2-9 systems for level 4, 10 or more for level 5)     Review of Systems   Constitutional: Positive for appetite change, fatigue and fever.  Negative for chills and unexpected weight change. HENT: Negative for congestion and sore throat. Eyes: Negative for visual disturbance. Respiratory: Positive for cough and shortness of breath. Negative for chest tightness, wheezing and stridor. Cardiovascular: Negative for chest pain, palpitations and leg swelling. Gastrointestinal: Negative for abdominal pain, nausea and vomiting. Endocrine: Negative for polydipsia and polyuria. Genitourinary: Negative for difficulty urinating, dysuria, flank pain and hematuria. Musculoskeletal: Positive for arthralgias and myalgias. Negative for joint swelling, neck pain and neck stiffness. Skin: Negative for rash and wound. Allergic/Immunologic: Negative for immunocompromised state. Neurological: Negative for dizziness, tremors, seizures, syncope, facial asymmetry, speech difficulty, weakness, numbness and headaches. Hematological: Does not bruise/bleed easily. Psychiatric/Behavioral: Negative for suicidal ideas. History of dementia, unsure of history is reliable       Positives and Pertinent negatives as per HPI. Except as noted above in the ROS, all other systems were reviewed and negative.        PAST MEDICAL HISTORY     Past Medical History:   Diagnosis Date    A-fib [facility])     Abnormal CXR (chest x-ray)     right costophrenic scar    Acute respiratory failure (HCC)     Allergic rhinitis     Anxiety     Arthritis     Atrial fibrillation (HCC)     CAD (coronary artery disease)     Carotid artery disease- LICA < 21% (repeat 7/59) 9/6/2011    Central hearing loss     CHF (congestive heart failure) (HCC)     Chronic kidney disease     Colon polyp- last colon 5/22/09 5/19/2011    COPD (chronic obstructive pulmonary disease) (HCC)     Dementia (HCC)     Dementia (HCC)     Depression     Diet-controlled diabetes mellitus (HCC)     Dizziness     Eczema     GERD (gastroesophageal reflux disease)     Groin strain 1/26/2018    Headache     Herpes zoster     HTN (hypertension)     Hyperlipidemia     Hypertrophy of prostate without urinary obstruction and other lower urinary tract symptoms (LUTS)     Lung nodules     Osteoporosis     Pneumonia     Prostate cancer ([facility] 2001    TURP, seeds    Pulmonary HTN ([facility]     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    TIA (transient ischemic attack)     Urinary retention 6/6/2017    Vertigo          SURGICAL HISTORY     Past Surgical History:   Procedure Laterality Date    CARDIAC SURGERY      COLONOSCOPY      CORONARY ARTERY BYPASS GRAFT  12/2005    CYSTOSCOPY  04/14/2017    CYSTOSCOPY, EVACUATION OF CLOTS, FULGURATION, BLADDER BIOPSY    EYE SURGERY  1998    LEFT RETINAL TEAR    INNER EAR SURGERY  1999    FOR VERTIGO    LUNG BIOPSY  1971    wedge resection, RLL, benign     PROSTATE SURGERY  2001    redo TURP for cancer    SKIN BIOPSY      TURP  1981         CURRENTMEDICATIONS       Previous Medications    ACETAMINOPHEN (TYLENOL) 325 MG TABLET    Take 650 mg by mouth every 6 hours as needed for Pain    AMLODIPINE (NORVASC) 10 MG TABLET    Take 1 tablet by mouth daily    ASPIRIN (ASPIRIN CHILDRENS) 81 MG CHEWABLE TABLET    Take 1 tablet by mouth daily    DOCUSATE SODIUM (COLACE) 100 MG CAPSULE    Take 1 capsule by mouth 2 times daily    DONEPEZIL (ARICEPT) 10 MG TABLET    Take 1 tablet by mouth nightly AFTER COMPLETE 30 DAYS AT 5 MG    FUROSEMIDE (LASIX) 40 MG TABLET    Take 1 tablet by mouth daily Take 40 mg daily. Increase to 60 mg if weight over 164 lbs. GABAPENTIN (NEURONTIN) 100 MG CAPSULE    Take 1 capsule by mouth 2 times daily for 30 days.     GEMFIBROZIL (LOPID) 600 MG TABLET    Take 1 tablet by mouth 2 times daily (before meals)    HYDRALAZINE (APRESOLINE) 100 MG TABLET    Take 1 tablet by mouth 3 times daily    ISOSORBIDE MONONITRATE (IMDUR) 60 MG EXTENDED RELEASE TABLET    Take 1 tablet by mouth daily Dr. Kameron Andrews LISINOPRIL (PRINIVIL;ZESTRIL) 10 MG TABLET    Take 1 tablet by mouth daily    POLYETHYLENE GLYCOL 3350 (MIRALAX PO)    Take 17 g by mouth    SERTRALINE (ZOLOFT) 50 MG TABLET    Take 1 tablet by mouth daily         ALLERGIES     Escitalopram, Flexeril [cyclobenzaprine hcl], Hydrocod polst-cpm polst er, Hydrocodone, Lipitor, Sudafed [pseudoephedrine hcl], and Tussionex pennkinetic er [employer] polst-cpm polst er]    FAMILYHISTORY       Family History   Problem Relation Age of Onset    Dementia Brother     Cancer Son           SOCIAL HISTORY       Social History     Tobacco Use    Smoking status: Former Smoker     Quit date: 1960     Years since quittin.5    Smokeless tobacco: Never Used    Tobacco comment: Advised not to resume   Vaping Use    Vaping Use: Never used   Substance Use Topics    Alcohol use: No    Drug use: No       SCREENINGS             PHYSICAL EXAM    (up to 7 for level 4, 8 or more for level 5)     ED Triage Vitals [21 1557]   BP Temp Temp Source Pulse Resp SpO2 Height Weight   (!) 183/62 98 °F (36.7 °C) Oral 51 24 96 % 5' 11\" (1.803 m) 158 lb (71.7 kg)       Physical Exam  Vitals and nursing note reviewed. Constitutional:       General: He is not in acute distress. Appearance: Normal appearance. He is ill-appearing. He is not toxic-appearing or diaphoretic. HENT:      Head: Normocephalic and atraumatic. Right Ear: External ear normal.      Left Ear: External ear normal.      Nose: Nose normal.      Mouth/Throat:      Mouth: Mucous membranes are moist.   Eyes:      General:         Right eye: No discharge. Left eye: No discharge. Extraocular Movements: Extraocular movements intact. Cardiovascular:      Rate and Rhythm: Bradycardia present. Rhythm irregular. Pulses: Normal pulses. Heart sounds: Normal heart sounds. Pulmonary:      Effort: Pulmonary effort is normal. No bradypnea, accessory muscle usage or respiratory distress.       Breath sounds: No stridor. Decreased breath sounds and wheezing present. No rhonchi or rales. Chest:      Chest wall: No tenderness. Abdominal:      Palpations: Abdomen is soft. Tenderness: There is no abdominal tenderness. Musculoskeletal:         General: Normal range of motion. Cervical back: Normal range of motion and neck supple. Right lower leg: No tenderness. No edema. Left lower leg: No tenderness. No edema. Skin:     General: Skin is warm and dry. Capillary Refill: Capillary refill takes less than 2 seconds. Neurological:      General: No focal deficit present. Mental Status: He is alert. GCS: GCS eye subscore is 4. GCS verbal subscore is 4. GCS motor subscore is 6.    Psychiatric:         Mood and Affect: Mood normal.         Behavior: Behavior normal.         DIAGNOSTIC RESULTS   LABS:    Labs Reviewed   CBC WITH AUTO DIFFERENTIAL - Abnormal; Notable for the following components:       Result Value    RBC 3.37 (*)     Hemoglobin 10.8 (*)     Hematocrit 31.6 (*)     Platelets 292 (*)     All other components within normal limits    Narrative:     Performed at:  [address] [place]   Phone [phone]   COMPREHENSIVE METABOLIC PANEL W/ REFLEX TO MG FOR LOW K - Abnormal; Notable for the following components:    Glucose 115 (*)     BUN 32 (*)     GFR Non- 51 (*)     All other components within normal limits    Narrative:     Performed at:  [address] [place] [facility]   Phone [phone]   TROPONIN - Abnormal; Notable for the following components:    Troponin 0.05 (*)     All other components within normal limits    Narrative:     [address]:  [address]   Phone [phone]   BRAIN NATRIURETIC PEPTIDE - Abnormal; Notable for the following components:    Pro-BNP 31,412 (*)     All other components within normal limits    Narrative:     Performed at:  [facility]  [address] [place], [place] [address]   Phone [phone] RAPID    Narrative:     Performed at:  [facility]  [address] [place] [address]   Phone [phone]   LIPASE    Narrative:     Performed at:  [facility]  [address] [place] [address]   Phone [phone]   MAGNESIUM    Narrative:     Performed at:  [facility]  [address] [place] [address]   Phone [phone]       All other labs were within normal range or not returned as of this dictation. EKG: All EKG's are interpreted by the Emergency Department Physician in the absence of a cardiologist.  Please see their note for interpretation of EKG. RADIOLOGY:   Non-plain film images such as CT, Ultrasound and MRI are read by the radiologist. Plain radiographic images are visualized and preliminarily interpreted by the ED Provider with the below findings:        Interpretation per the Radiologist below, if available at the time of this note:    CT CHEST PULMONARY EMBOLISM W CONTRAST   Final Result   Slightly limited exam due to the motion artifact along the lung bases with no   obvious pulmonary embolus identified. Recommend clinical follow-up. Mildly dilated and atherosclerotic thoracic aorta with no aneurysm or   dissection      Moderate bibasilar pleural effusions and associated moderate atelectasis and   consolidations along the lung bases extending superiorly which is more   prominent on the left with probable small loculated pleural fluid collections   along the upper chest bilaterally. Recommend follow-up      Borderline enlarged mediastinal lymph nodes which are probably reactive in   etiology. Recommend follow-up. Status post CABG surgery and mild cardiomegaly which is unchanged. Mild compression fractures of T12 and L1, the age of which are indeterminate. XR CHEST PORTABLE   Final Result   1. Interstitial pulmonary edema with possible perihilar alveolar edema   suggestive of congestive heart failure given mild cardiomegaly and bilateral   pleural effusions. Pneumonia could appear similar. 2. Small to moderate bilateral pleural effusions with underlying bibasilar   passive atelectasis. Superimposed pneumonia and aspiration are not excluded. No results found. PROCEDURES   Unless otherwise noted below, none     Procedures    CRITICAL CARE TIME   N/A    CONSULTS:  IP CONSULT TO HOSPITALIST  IP CONSULT TO CARDIOLOGY      EMERGENCY DEPARTMENT COURSE and DIFFERENTIAL DIAGNOSIS/MDM:   Vitals:    Vitals:    06/26/21 1557 06/26/21 1642   BP: (!) 183/62    Pulse: 51    Resp: 24 20   Temp: 98 °F (36.7 °C)    TempSrc: Oral    SpO2: 96% 93%   Weight: 158 lb (71.7 kg)    Height: 5' 11\" (1.803 m)        Patient was given the following medications:  Medications   ipratropium-albuterol (DUONEB) nebulizer solution 1 ampule (1 ampule Inhalation Given 6/26/21 1641)   dexamethasone (PF) (DECADRON) injection 10 mg (10 mg Intravenous Given 6/26/21 1645)   iopamidol (ISOVUE-370) 76 % injection 75 mL (75 mLs Intravenous Given 6/26/21 1825)   furosemide (LASIX) injection 40 mg (40 mg Intravenous Given 6/26/21 2011)         This is a 70-year-old  male with history of multiple comorbid's including atrial fibrillation, CHF, chronic kidney disease, COPD, coronary artery disease, diabetes, hypertension who presents to the emergency department today via EMS from local [facility] for evaluation of shortness of breath, dizziness, and right-sided chest pain. Patient does have history of dementia and is unable to tell me how long he has felt this way. Patient arrives to the emergency department nontoxic and afebrile.   He has heart rate in the upper forties. Initial oxygen saturation is 91% on room air. Patient is alert and oriented to name and place. He follows all commands. EKG interpreted by ED physician reviewed myself shows atrial fibrillation with slow ventricular response. Chest x-ray shows findings consistent with CHF and cardiomegaly. Bilateral pleural effusions are also noted. Laboratory studies notable for a troponin of 0.05 (consistent with previous), BNP elevated at 31,412, remaining laboratory studies have been unremarkable. CT chest is negative for PE but does confirm bilateral pleural effusions in addition to age-indeterminate compression fractures. While here in the ED patient was noted to become hypoxic with minimal exertion requiring supplemental oxygen. Patient's heart rate was noted to drop into the low forties frequently throughout ED visit. Patient did receive IV Lasix 40 mg while here in the ED. At this time there is no evidence of any life-threatening or emergent conditions requiring immediate intervention however given patient's symptoms and ED work-up I do feel he will benefit from admission for further evaluation and treatment. Patient admitted under hospitalist service. FINAL IMPRESSION      1. Acute on chronic congestive heart failure, unspecified heart failure type ([address]    2. Hypoxia    3. Symptomatic bradycardia          DISPOSITION/PLAN   DISPOSITION        PATIENT REFERRED TO:  No follow-up provider specified.     DISCHARGE MEDICATIONS:  New Prescriptions    No medications on file       DISCONTINUED MEDICATIONS:  Discontinued Medications    No medications on file              (Please note that portions of this note were completed with a voice recognition program.  Efforts were made to edit the dictations but occasionally words are mis-transcribed.)    RAYSHAWN Cruz CNP (electronically signed)           RAYSHAWN Cruz CNP  06/26/21 2038

## 2021-06-26 NOTE — ED NOTES
Patient stood to use bedside commode and had increasing SOB with movement and SpO2 dropped to 88% but bounced back up when he has back in bed resting.       [facility]  06/26/21 7878

## 2021-06-27 PROBLEM — R07.81 PLEURITIC CHEST PAIN: Status: ACTIVE | Noted: 2019-07-25

## 2021-06-27 PROBLEM — J96.11 CHRONIC RESPIRATORY FAILURE WITH HYPOXIA AND HYPERCAPNIA (HCC): Status: ACTIVE | Noted: 2021-06-27

## 2021-06-27 PROBLEM — F03.90 DEMENTIA (HCC): Status: ACTIVE | Noted: 2021-06-27

## 2021-06-27 PROBLEM — J96.12 CHRONIC RESPIRATORY FAILURE WITH HYPOXIA AND HYPERCAPNIA (HCC): Status: ACTIVE | Noted: 2021-06-27

## 2021-06-27 LAB
ANION GAP SERPL CALCULATED.3IONS-SCNC: 12 MMOL/L (ref 3–16)
BASOPHILS ABSOLUTE: 0 K/UL (ref 0–0.2)
BASOPHILS RELATIVE PERCENT: 0.2 %
BUN BLDV-MCNC: 34 MG/DL (ref 7–20)
CALCIUM SERPL-MCNC: 9.1 MG/DL (ref 8.3–10.6)
CHLORIDE BLD-SCNC: 106 MMOL/L (ref 99–110)
CO2: 28 MMOL/L (ref 21–32)
CREAT SERPL-MCNC: 1.5 MG/DL (ref 0.8–1.3)
EOSINOPHILS ABSOLUTE: 0 K/UL (ref 0–0.6)
EOSINOPHILS RELATIVE PERCENT: 0 %
GFR AFRICAN AMERICAN: 53
GFR NON-AFRICAN AMERICAN: 43
GLUCOSE BLD-MCNC: 124 MG/DL (ref 70–99)
HCT VFR BLD CALC: 30 % (ref 40.5–52.5)
HEMOGLOBIN: 10.2 G/DL (ref 13.5–17.5)
LYMPHOCYTES ABSOLUTE: 0.6 K/UL (ref 1–5.1)
LYMPHOCYTES RELATIVE PERCENT: 17.1 %
MCH RBC QN AUTO: 31.9 PG (ref 26–34)
MCHC RBC AUTO-ENTMCNC: 34 G/DL (ref 31–36)
MCV RBC AUTO: 93.9 FL (ref 80–100)
MONOCYTES ABSOLUTE: 0.1 K/UL (ref 0–1.3)
MONOCYTES RELATIVE PERCENT: 3.9 %
NEUTROPHILS ABSOLUTE: 2.7 K/UL (ref 1.7–7.7)
NEUTROPHILS RELATIVE PERCENT: 78.8 %
PDW BLD-RTO: 14.3 % (ref 12.4–15.4)
PLATELET # BLD: 117 K/UL (ref 135–450)
PMV BLD AUTO: 7.6 FL (ref 5–10.5)
POTASSIUM REFLEX MAGNESIUM: 3.7 MMOL/L (ref 3.5–5.1)
PROCALCITONIN: 0.06 NG/ML (ref 0–0.15)
RBC # BLD: 3.19 M/UL (ref 4.2–5.9)
SODIUM BLD-SCNC: 146 MMOL/L (ref 136–145)
TROPONIN: 0.04 NG/ML
WBC # BLD: 3.5 K/UL (ref 4–11)

## 2021-06-27 PROCEDURE — 99223 1ST HOSP IP/OBS HIGH 75: CPT | Performed by: INTERNAL MEDICINE

## 2021-06-27 PROCEDURE — 2700000000 HC OXYGEN THERAPY PER DAY

## 2021-06-27 PROCEDURE — 6370000000 HC RX 637 (ALT 250 FOR IP): Performed by: INTERNAL MEDICINE

## 2021-06-27 PROCEDURE — 1200000000 HC SEMI PRIVATE

## 2021-06-27 PROCEDURE — 85025 COMPLETE CBC W/AUTO DIFF WBC: CPT

## 2021-06-27 PROCEDURE — 80048 BASIC METABOLIC PNL TOTAL CA: CPT

## 2021-06-27 PROCEDURE — 84484 ASSAY OF TROPONIN QUANT: CPT

## 2021-06-27 PROCEDURE — 6360000002 HC RX W HCPCS: Performed by: INTERNAL MEDICINE

## 2021-06-27 PROCEDURE — 36415 COLL VENOUS BLD VENIPUNCTURE: CPT

## 2021-06-27 PROCEDURE — 84145 PROCALCITONIN (PCT): CPT

## 2021-06-27 PROCEDURE — 2580000003 HC RX 258: Performed by: INTERNAL MEDICINE

## 2021-06-27 PROCEDURE — 94640 AIRWAY INHALATION TREATMENT: CPT

## 2021-06-27 PROCEDURE — 87641 MR-STAPH DNA AMP PROBE: CPT

## 2021-06-27 PROCEDURE — 94761 N-INVAS EAR/PLS OXIMETRY MLT: CPT

## 2021-06-27 RX ORDER — GABAPENTIN 100 MG/1
100 CAPSULE ORAL 2 TIMES DAILY
COMMUNITY

## 2021-06-27 RX ORDER — IPRATROPIUM BROMIDE AND ALBUTEROL SULFATE 2.5; .5 MG/3ML; MG/3ML
1 SOLUTION RESPIRATORY (INHALATION) EVERY 6 HOURS PRN
COMMUNITY

## 2021-06-27 RX ORDER — IPRATROPIUM BROMIDE AND ALBUTEROL SULFATE 2.5; .5 MG/3ML; MG/3ML
1 SOLUTION RESPIRATORY (INHALATION) 2 TIMES DAILY
Status: DISCONTINUED | OUTPATIENT
Start: 2021-06-27 | End: 2021-06-29 | Stop reason: HOSPADM

## 2021-06-27 RX ORDER — IPRATROPIUM BROMIDE AND ALBUTEROL SULFATE 2.5; .5 MG/3ML; MG/3ML
1 SOLUTION RESPIRATORY (INHALATION)
Status: DISCONTINUED | OUTPATIENT
Start: 2021-06-27 | End: 2021-06-27

## 2021-06-27 RX ORDER — DOCUSATE SODIUM 100 MG/1
100 CAPSULE, LIQUID FILLED ORAL DAILY
COMMUNITY

## 2021-06-27 RX ORDER — LISINOPRIL 20 MG/1
20 TABLET ORAL DAILY
Status: DISCONTINUED | OUTPATIENT
Start: 2021-06-28 | End: 2021-06-27

## 2021-06-27 RX ORDER — ACETAMINOPHEN 500 MG
500 TABLET ORAL EVERY 6 HOURS PRN
COMMUNITY

## 2021-06-27 RX ORDER — ALBUTEROL SULFATE 2.5 MG/3ML
2.5 SOLUTION RESPIRATORY (INHALATION)
Status: DISCONTINUED | OUTPATIENT
Start: 2021-06-27 | End: 2021-06-29 | Stop reason: HOSPADM

## 2021-06-27 RX ADMIN — ENOXAPARIN SODIUM 40 MG: 40 INJECTION SUBCUTANEOUS at 08:27

## 2021-06-27 RX ADMIN — AMLODIPINE BESYLATE 10 MG: 10 TABLET ORAL at 08:27

## 2021-06-27 RX ADMIN — FUROSEMIDE 40 MG: 10 INJECTION, SOLUTION INTRAVENOUS at 17:13

## 2021-06-27 RX ADMIN — HYDRALAZINE HYDROCHLORIDE 100 MG: 50 TABLET, FILM COATED ORAL at 20:10

## 2021-06-27 RX ADMIN — IPRATROPIUM BROMIDE AND ALBUTEROL SULFATE 1 AMPULE: .5; 3 SOLUTION RESPIRATORY (INHALATION) at 12:31

## 2021-06-27 RX ADMIN — Medication 10 ML: at 20:10

## 2021-06-27 RX ADMIN — LISINOPRIL 10 MG: 10 TABLET ORAL at 08:27

## 2021-06-27 RX ADMIN — CEFEPIME HYDROCHLORIDE 2000 MG: 2 INJECTION, POWDER, FOR SOLUTION INTRAVENOUS at 10:19

## 2021-06-27 RX ADMIN — HYDRALAZINE HYDROCHLORIDE 100 MG: 50 TABLET, FILM COATED ORAL at 08:27

## 2021-06-27 RX ADMIN — ISOSORBIDE MONONITRATE 60 MG: 60 TABLET, EXTENDED RELEASE ORAL at 08:27

## 2021-06-27 RX ADMIN — VANCOMYCIN HYDROCHLORIDE 1000 MG: 1 INJECTION, POWDER, LYOPHILIZED, FOR SOLUTION INTRAVENOUS at 11:27

## 2021-06-27 RX ADMIN — FUROSEMIDE 40 MG: 10 INJECTION, SOLUTION INTRAVENOUS at 08:27

## 2021-06-27 RX ADMIN — SERTRALINE 50 MG: 50 TABLET, FILM COATED ORAL at 08:27

## 2021-06-27 RX ADMIN — ASPIRIN 81 MG: 81 TABLET, CHEWABLE ORAL at 08:27

## 2021-06-27 RX ADMIN — Medication 10 ML: at 08:27

## 2021-06-27 RX ADMIN — IPRATROPIUM BROMIDE AND ALBUTEROL SULFATE 1 AMPULE: .5; 3 SOLUTION RESPIRATORY (INHALATION) at 21:36

## 2021-06-27 RX ADMIN — IPRATROPIUM BROMIDE AND ALBUTEROL SULFATE 1 AMPULE: .5; 3 SOLUTION RESPIRATORY (INHALATION) at 16:28

## 2021-06-27 ASSESSMENT — PAIN SCALES - WONG BAKER: WONGBAKER_NUMERICALRESPONSE: 0

## 2021-06-27 NOTE — PROGRESS NOTES
Progress Note  Admit Date: 6/26/2021      PCP: Margarita Cartagena     CC: F/U for loss of breath and abdominal pain    Days in hospital:  1    SUBJECTIVE / Interval History:  Pt feels ok, states SOB improved   R side CP better    Patient does have memory issues and review of systems limited due to it        Allergies  Escitalopram, Flexeril [cyclobenzaprine hcl], Hydrocod polst-cpm polst er, Hydrocodone, Lipitor, Sudafed [pseudoephedrine hcl], and Tussionex pennkinetic er [employer] polst-cpm polst er]    Medications    Scheduled Meds:   sertraline  50 mg Oral Daily    lisinopril  10 mg Oral Daily    isosorbide mononitrate  60 mg Oral Daily    aspirin  81 mg Oral Daily    amLODIPine  10 mg Oral Daily    furosemide  40 mg Intravenous BID    sodium chloride flush  10 mL Intravenous 2 times per day    enoxaparin  40 mg Subcutaneous Daily    hydrALAZINE  100 mg Oral BID     Continuous Infusions:   sodium chloride         PRN Meds:  sodium chloride flush, sodium chloride, ondansetron, polyethylene glycol, acetaminophen **OR** acetaminophen    Vitals    BP (!) 175/82   Pulse 50   Temp 98 °F (36.7 °C) (Oral)   Resp 16   Ht 5' 11\" (1.803 m)   Wt 142 lb 6.7 oz (64.6 kg)   SpO2 94%   BMI 19.86 kg/m²     Exam:    Gen: No distress. Eyes: PERRL. No sclera icterus. No conjunctival injection. ENT: No discharge. Pharynx clear. External appearance of ears and nose normal.  Neck: Trachea midline. No obvious mass. Resp: No accessory muscle use. No crackles. No wheezes. No rhonchi. No dullness on percussion. CV: Regular rate. Regular rhythm. No murmur or rub. No edema. GI: Non-tender. Non-distended. No hernia. Skin: Warm, dry, normal texture and turgor. No nodule on exposed extremities. Lymph: No cervical LAD. No supraclavicular LAD. M/S: No cyanosis. No clubbing. No joint deformity. Neuro: Moves all four extremities. CN 2-12 tested, no defect noted. Psych: Oriented x 3. No anxiety. Awake. Alert. Intact judgement and insight. Data    LABS  CBC:   Recent Labs     06/26/21  1631 06/27/21  0621   WBC 6.1 3.5*   HGB 10.8* 10.2*   HCT 31.6* 30.0*   MCV 93.8 93.9   * 117*     BMP:   Recent Labs     06/26/21  1631 06/27/21  0621    146*   K 3.5 3.7    106   CO2 27 28   BUN 32* 34*   CREATININE 1.3 1.5*   GLUCOSE 115* 124*     POC GLUCOSE:  No results for input(s): POCGLU in the last 72 hours. LIVER PROFILE:   Recent Labs     06/26/21  1631   AST 18   ALT 11   LIPASE 34.0   LABALBU 4.0   BILITOT 0.8   ALKPHOS 118     PT/INR: No results for input(s): PROTIME, INR in the last 72 hours. APTT: No results for input(s): APTT in the last 72 hours. UA:No results for input(s): NITRITE, COLORU, PHUR, LABCAST, WBCUA, RBCUA, MUCUS, TRICHOMONAS, YEAST, BACTERIA, CLARITYU, SPECGRAV, LEUKOCYTESUR, UROBILINOGEN, BILIRUBINUR, BLOODU, GLUCOSEU, KETUA, AMORPHOUS in the last 72 hours. Microbiology:  Wound Culture: No results for input(s): WNDABS, ORG in the last 72 hours. Invalid input(s):  LABGRAM  Nasal Culture: No results for input(s): ORG, MRSAPCR in the last 72 hours. Blood Culture: No results for input(s): BC, BLOODCULT2 in the last 72 hours. Fungal Culture:   No results for input(s): FUNGSM in the last 72 hours. No results for input(s): FUNCXBLD in the last 72 hours. CSF Culture:  No results for input(s): COLORCSF, APPEARCSF, CFTUBE, CLOTCSF, WBCCSF, RBCCSF, NEUTCSF, NUMCELLSCSF, LYMPHSCSF, MONOCSF, GLUCCSF, VOLCSF in the last 72 hours. Respiratory Culture:  No results for input(s): [place] in the last 72 hours. AFB:No results for input(s): AFBSMEAR in the last 72 hours. Urine Culture  No results for input(s): LABURIN in the last 72 hours. RADIOLOGY:    CT CHEST PULMONARY EMBOLISM W CONTRAST   Final Result   Slightly limited exam due to the motion artifact along the lung bases with no   obvious pulmonary embolus identified. Recommend clinical follow-up.       Mildly dilated and atherosclerotic thoracic aorta with no aneurysm or   dissection      Moderate bibasilar pleural effusions and associated moderate atelectasis and   consolidations along the lung bases extending superiorly which is more   prominent on the left with probable small loculated pleural fluid collections   along the upper chest bilaterally. Recommend follow-up      Borderline enlarged mediastinal lymph nodes which are probably reactive in   etiology. Recommend follow-up. Status post CABG surgery and mild cardiomegaly which is unchanged. Mild compression fractures of T12 and L1, the age of which are indeterminate. XR CHEST PORTABLE   Final Result   1. Interstitial pulmonary edema with possible perihilar alveolar edema   suggestive of congestive heart failure given mild cardiomegaly and bilateral   pleural effusions. Pneumonia could appear similar. 2. Small to moderate bilateral pleural effusions with underlying bibasilar   passive atelectasis. Superimposed pneumonia and aspiration are not excluded. CONSULTS:    IP CONSULT TO HOSPITALIST  IP CONSULT TO CARDIOLOGY    ASSESSMENT AND PLAN:      Principal Problem:    Acute on chronic diastolic (congestive) heart failure (HCC)  Active Problems:    COPD (chronic obstructive pulmonary disease) ([facility] [address]    Essential hypertension    Coronary artery disease involving native coronary artery of native heart without angina pectoris    Atrial fibrillation with slow ventricular response (HCC)    Hyperglycemia    Stage 3 chronic kidney disease (HCC)    Elevated troponin    Pleuritic chest pain    Chronic respiratory failure with hypoxia and hypercapnia (HCC)    Dementia (HCC)  Resolved Problems:    * No resolved hospital problems.  *    Patient is a 49-year-old male with a past medical history of 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 fib, CKD, COPD, chronic respiratory failure needing 3 L of oxygen was sent from his nursing facility for 1 week of shortness of breath and right upper quadrant pain. Patient was admitted with CHF exacerbation    CHF exacerbation, acute on CHr R sided HF with pulm HTN  -Possible diastolic heart failure  Last echo showed a EF of-55%  -DESTIN and daily weight    Pneumonia  -Patient is from a nursing home. CT shows effusion with consolidation  -Procalcitonin ordered  -Start broad-spectrum antibiotics    Pleural effusion  CT shows possible loculation-  -we will consult pulmonology    COPD with chronic respiratory failure  -Patient has chronic hypoxia and uses 3 L of oxygen at baseline    Coronary artery disease  -Continue aspirin    A. fib with slow ventricular response  Patient is not on anticoagulation due to hematuria-    Chronic kidney disease stage III  -Baseline creatinine 1.3-1.5  -hold lisinopril with diuresis    Hypertension  -Monitor blood pressure and continue home meds    Dementia  -Supportive care    Patient is a DNR CC per records will consult palliative care        DVT Prophylaxis: lovenox  Diet: ADULT DIET; Regular; 1500 ml  Code Status: DNR-CC    PT/OT Eval Status:ordered    Discharge plan - ct care    The patient and / or the family were informed of the results of any tests, a time was given to answer questions, a plan was proposed and they agreed with plan. Discussed with consulting physicians, nursing and social work     The note was completed using EMR. Every effort was made to ensure accuracy; however, inadvertent computerized transcription errors may be present.        Jenn Brewer MD

## 2021-06-27 NOTE — PROGRESS NOTES
4 Eyes Skin Assessment     NAME:  Yudy Rodriguez  YOB: 1926  MEDICAL RECORD NUMBER:  [id number]    The patient is being assess for  Admission    I agree that 2 RN's have performed a thorough Head to Toe Skin Assessment on the patient. ALL assessment sites listed below have been assessed. Areas assessed by both nurses:    Head, Face, Ears, Shoulders, Back, Chest, Arms, Elbows, Hands, Sacrum. Buttock, Coccyx, Ischium and Legs. Feet and Heels        Does the Patient have a Wound?  No noted wound(s)       [place] Prevention initiated:  Yes   Wound Care Orders initiated:  No    Pressure Injury (Stage 3,4, Unstageable, DTI, NWPT, and Complex wounds) if present place consult order under [de-identified] No    New and Established Ostomies if present place consult order under : No      Nurse 1 eSignature: Electronically signed by Juan Small RN on 6/26/21 at 11:05 PM EDT    **SHARE this note so that the co-signing nurse is able to place an eSignature**    Nurse 2 eSignature: Electronically signed by Will Davila RN on 6/26/21 at 11:05 PM EDT

## 2021-06-27 NOTE — CONSULTS
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    TIA (transient ischemic attack)     Urinary retention 6/6/2017    Vertigo          Past Surgical History:   Procedure Laterality Date    CARDIAC SURGERY      COLONOSCOPY      CORONARY ARTERY BYPASS GRAFT  12/2005    CYSTOSCOPY  04/14/2017    CYSTOSCOPY, EVACUATION OF CLOTS, FULGURATION, BLADDER BIOPSY    EYE SURGERY  1998    LEFT RETINAL TEAR    INNER EAR SURGERY  1999    FOR VERTIGO    LUNG BIOPSY  1971    wedge resection, RLL, benign     PROSTATE SURGERY  2001    redo TURP for cancer    SKIN BIOPSY      TURP  1981       Family Hx  family history includes Cancer in his son; Dementia in his brother. Social Hx   reports that he quit smoking about 61 years ago. He has never used smokeless tobacco.    Scheduled Meds:   cefepime  2,000 mg Intravenous Q24H    ipratropium-albuterol  1 ampule Inhalation Q4H [place]    sertraline  50 mg Oral Daily    lisinopril  10 mg Oral Daily    isosorbide mononitrate  60 mg Oral Daily    aspirin  81 mg Oral Daily    amLODIPine  10 mg Oral Daily    furosemide  40 mg Intravenous BID    sodium chloride flush  10 mL Intravenous 2 times per day    enoxaparin  40 mg Subcutaneous Daily    hydrALAZINE  100 mg Oral BID       Continuous Infusions:   sodium chloride         PRN Meds:  sodium chloride flush, sodium chloride, ondansetron, polyethylene glycol, acetaminophen **OR** acetaminophen    ALLERGIES:  Patient is allergic to escitalopram, flexeril [cyclobenzaprine hcl], hydrocod polst-cpm polst er, hydrocodone, lipitor, sudafed [pseudoephedrine hcl], and tussionex pennkinetic er [employer] er].     REVIEW OF SYSTEMS:  Constitutional: Negative for fever  HENT: Negative for sore throat  Eyes: Negative for redness   Respiratory: + Dyspnea, cough  Cardiovascular: Negative for chest pain  Gastrointestinal: Negative for vomiting, diarrhea   Genitourinary: Negative for images / reports were reviewed as a part of this visit. CT chest images reviewed personally by divya valencia as follows:  -Bilateral moderate-sized pleural effusions, right partially loculated. Also evidence of multiple pulmonary nodules most notable in the right lower lobe. Some of these are calcified. There is also evidence of increased interstitial thickening bilaterally. CT scan was consistent with pulmonary edema and acute fluid overload. Echocardiogram 3/2017  Summary   Left ventricle size is normal. Mild concentric left ventricular hypertrophy   is present. Ejection fraction is visually estimated to be 55%. No regional   wall motion abnormalities are noted. Patient appears to be in atrial   fibrillation. The right ventricle is moderately dilated. Right ventricular systolic   function appears mildly reduced. The left atrium is mildly dilated. The right atrium is moderately dilated. Mild aortic, mitral, pulmonic regurgitation. There is moderate tricuspid regurgitation. Estimated pulmonary artery systolic pressure is elevated at 64 mmHg assuming   a right atrial pressure of 8 mmHg. There is a large pleural effusion.       Assessment/plan:     Acute hypoxemic respiratory failure with SPO2 less than 90% on room air  -Due to pulmonary edema  -Wean supplemental oxygen goal SPO2 90%    Pleural effusion, bilateral  -Due to fluid overload given bilateral nature, very elevated BNP and no history of heart failure.  -Has had effusion in the past on echocardiogram  -Recommend aggressive diuresis as blood pressure and renal function tolerates  -Defer thoracentesis at this time    Acute systolic and diastolic heart failure  -Cardiology consulted, diuresis as tolerated    Lung nodules  -Large calcified granuloma in the right lower lobe stable, other more solid-appearing nodules in the right lower lobe could likely be intrafissural fluid collections  -We will need monitoring    Pulmonary

## 2021-06-27 NOTE — PROGRESS NOTES
Patient A&O. Nurse received patient at 3pm. Nurse received report from nurse leaving. No complaints at this time. Call light within reach, bed in lowest position, gripper socks on. Nurse will continue to monitor.

## 2021-06-27 NOTE — PLAN OF CARE
Problem: Falls - Risk of:  Goal: Will remain free from falls  Description: Will remain free from falls  Outcome: Ongoing  Goal: Absence of physical injury  Description: Absence of physical injury  Outcome: Ongoing     Problem: Safety:  Goal: Free from accidental physical injury  Description: Free from accidental physical injury  Outcome: Ongoing  Note: Bed in lowest position, wheels locked, bed/chair exit alarm in place, call light within reach, and non skid footwear on. Walkway free of clutter. Pt alert and oriented and able to make needs known. Pt educated to use call light when needing to get up, and pt utilizes call light to make needs known. Will continue to monitor. Goal: Free from intentional harm  Description: Free from intentional harm  Outcome: Ongoing  Note: Pt is free of intentional harm. No intentions noted. Will monitor. Problem: Daily Care:  Goal: Daily care needs are met  Description: Daily care needs are met  Outcome: Ongoing  Note: Patient assessed to determine ability to perform daily needs and ADLs. Patient assisted with any daily needs that were not able to be met individually by patient. [place] encouraged, although patient educated to ask for assistance when needed. Will continue to monitor and assist patient with meeting daily care needs as needed. Problem: Skin Integrity:  Goal: Skin integrity will stabilize  Description: Skin integrity will stabilize  Outcome: Ongoing  Note: Protillo score assessed. Patient able to turn self. Repositioned patient Q2H and assessed skin. Educated patient on importance of repositioning to prevent skin issues.

## 2021-06-27 NOTE — RT PROTOCOL NOTE
RT Inhaler-Nebulizer Bronchodilator Protocol Note    There is a bronchodilator order in the chart from a provider indicating to follow the RT Bronchodilator Protocol and there is an Initiate RT Bronchodilator Protocol order as well (see protocol at bottom of note). The findings from the last RT Protocol Assessment were as follows:  Smoking: <15 Pack years  Surgical Status: No surgery  Xray: Multiple lobe infiltrates/atelectasis/pleural effusion/edema  Respiratory Pattern: RR <12 or 21-30  Mental Status: Alert and Oriented  Breath Sounds: Diminished and/or crackles  Cough: Weak, non-productive  Activity Level: Mostly sedentary, minimal walking  Oxygen Requirement: Room Air - 2LNC/28% or home setting  Indication for Bronchodilator Therapy: Decreased or absent breath sounds  Bronchodilator Assessment Score: 9    Aerosolized bronchodilator medication orders have been revised according to the RT Bronchodilator Protocol. RT Inhaler-Nebulizer Bronchodilator Protocol:    Respiratory Therapist to perform RT Therapy Protocol Assessment then follow the protocol. No Indications - adjust the frequency to every 6 hours PRN wheezing or bronchospasm, if no treatments needed after 48 hours then discontinue using Per Protocol order mode. If indication present, adjust the RT bronchodilator orders based on the Bronchodilator Assessment Score as follows:    0-6 - enter or revise RT bronchodilator order to Albuterol Inhaler order with frequency of every 2 hours PRN for wheezing or increased work of breathing using Per Protocol order mode. If Albuterol Inhaler not tolerated or not effective, then discontinue the Albuterol Inhaler order and enter Albuterol Nebulizer order with same frequency and PRN reasons. Repeat RT Therapy Protocol Assessment as needed.     7-10 - discontinue any other Inpatient aerosolized bronchodilator medication orders and enter or revise two Albuterol Inhaler orders, one with BID frequency and one with frequency of every 2 hours PRN wheezing or increased work of breathing using Per Protocol order mode. Repeat RT Therapy Protocol Assessment with second treatment then BID and as needed. If Albuterol Inhaler not tolerated or not effective, then discontinue the Albuterol Inhaler orders and enter two Albuterol Nebulizer orders with same frequencies and PRN reasons. 11-13 - discontinue any other Inpatient aerosolized bronchodilator medication orders and enter DuoNeb Nebulizer orders QID frequency and an Albuterol Nebulizer order every 2 hours PRN wheezing or increased work of breathing using Per Protocol order mode. Repeat RT Therapy Protocol Assessment with second treatment then QID and as needed. Greater than 13 - discontinue any other Inpatient bronchodilator aerosolized medication orders and enter DuoNeb Nebulizer order every 4 hours frequency and Albuterol Nebulizer every 2 hours PRN wheezing or increased work of breathing using Per Protocol order mode. Repeat RT Therapy Protocol Assessment with second treatment then every 4 hours and as needed. RT to enter RT Home Evaluation for COPD & MDI Assessment order using Per Protocol order mode.     Electronically signed by Chris Patel RCP on 6/27/2021 at 5:43 PM

## 2021-06-27 NOTE — PROGRESS NOTES
Medication Reconciliation    List of medications patient is currently taking is complete. Source of information: 1.  [facility] med list                                      2. EPIC records      Allergies  Escitalopram, Flexeril [cyclobenzaprine hcl], Hydrocod polst-cpm polst er, Hydrocodone, Lipitor, Sudafed [pseudoephedrine hcl], and Tussionex pennkinetic er [facility] polst-cpm polst er]        Elvira Noble [facility]   6/27/2021  4:17 AM

## 2021-06-27 NOTE — CONSULTS
Referring Physician: Dr. Serge Fields  Reason for Consultation: \"CHF, bradycardia. A. fib\"  Chief Complaint: Abdominal pain    Subjective:   History of Present Illness:  David Dennis is a 80 y.o. patient who presented to the hospital from a nursing home with complaints reportedly of abdominal pain and shortness of breath. The patient has dementia and is a challenging historian. He does not recall why he was sent to the hospital.  Currently denies abdominal pain. He endorses mild right-sided chest pain with deep breaths. In general, he states that he feels comfortable and wants to go home. There are no family members present bedside. The patient is well-known to me and have had multiple conversations with family members. The goals of care are comfort based. The patient has chronic lower extremity edema but he is unaware of any significant changes. His recorded weight in the office February 2021 was 153 pounds and his current recorded weight is 142 pounds.     Past Medical History:   has a past medical history of A-fib ([address] Abnormal CXR (chest x-ray), Acute respiratory failure ([address] Allergic rhinitis, Anxiety, Arthritis, Atrial fibrillation ([address] CAD (coronary artery disease), Carotid artery disease- LICA < 00% (repeat 3/50), Central hearing loss, CHF (congestive heart failure) ([address] Chronic kidney disease, Colon polyp- last colon 5/22/09, COPD (chronic obstructive pulmonary disease) ([address] Dementia ([address] Dementia ([address] Depression, Diet-controlled diabetes mellitus ([address] Dizziness, Eczema, GERD (gastroesophageal reflux disease), Groin strain, Headache, Herpes zoster, HTN (hypertension), Hyperlipidemia, Hypertrophy of prostate without urinary obstruction and other lower urinary tract symptoms (LUTS), Lung nodules, Osteoporosis, Pneumonia, Prostate cancer ([address] Pulmonary HTN ([address] RLS (restless legs syndrome), 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 Surgical History:   has a past surgical history that includes Coronary artery bypass graft (12/2005); Inner ear surgery (1999); TURP (1981); eye surgery (1998); Prostate surgery (2001); Lung biopsy (1971); Cardiac surgery; Cystoscopy (04/14/2017); Colonoscopy; and skin biopsy. Social History:   reports that he quit smoking about 61 years ago. He has never used smokeless tobacco. He reports that he does not drink alcohol and does not use drugs. Family History:  family history includes Cancer in his son; Dementia in his brother. Home Medications:  Were reviewed and are listed in nursing record and/or below  Prior to Admission medications    Medication Sig Start Date End Date Taking? Authorizing Provider   acetaminophen (TYLENOL) 500 MG tablet Take 500 mg by mouth every 6 hours as needed for Pain   Yes Historical Provider, MD   Menthol, Topical Analgesic, 4 % GEL Apply topically 4 times daily as needed (Pain) Apply to lower back topically four times daily as needed for pain   Yes Historical Provider, MD   docusate sodium (COLACE) 100 MG capsule Take 100 mg by mouth daily   Yes Historical Provider, MD   gabapentin (NEURONTIN) 100 MG capsule Take 100 mg by mouth 2 times daily.    Yes Historical Provider, MD   ipratropium-albuterol (DUONEB) 0.5-2.5 (3) MG/3ML SOLN nebulizer solution Inhale 1 vial into the lungs every 6 hours as needed for Shortness of Breath   Yes Historical Provider, MD   polyethylene glycol (MIRALAX) 17 g packet Take 17 g by mouth three times a week Mon/Wed/Sat   Yes Historical Provider, MD   sertraline (ZOLOFT) 50 MG tablet Take 1 tablet by mouth daily 4/22/20  Yes Alejo Ann MD   lisinopril (PRINIVIL;ZESTRIL) 10 MG tablet Take 1 tablet by mouth daily 4/22/20  Yes Alejo Ann MD   isosorbide mononitrate (IMDUR) 60 MG extended release tablet Take 1 tablet by mouth daily Dr. Phoebe Lomas Feeling 4/20/20  Yes Alejo Ann MD   furosemide (LASIX) 40 MG tablet Take 1 tablet by mouth daily Take 40 mg daily. Increase to 60 mg if weight over 164 lbs. 4/20/20  Yes Maxwell Lunsford MD   amLODIPine (NORVASC) 10 MG tablet Take 1 tablet by mouth daily 3/18/20  Yes Maxwell Lunsford MD   aspirin (ASPIRIN CHILDRENS) 81 MG chewable tablet Take 1 tablet by mouth daily 8/29/19  Yes Maxwell Lunsford MD   hydrALAZINE (APRESOLINE) 100 MG tablet Take 1 tablet by mouth 3 times daily  Patient taking differently: Take 100 mg by mouth 2 times daily  12/9/17  Yes Tracey Paredes MD        CURRENT Medications:  cefepime (MAXIPIME) 2000 mg IVPB minibag, Q24H  ipratropium-albuterol (DUONEB) nebulizer solution 1 ampule, Q4H WA  sertraline (ZOLOFT) tablet 50 mg, Daily  lisinopril (PRINIVIL;ZESTRIL) tablet 10 mg, Daily  isosorbide mononitrate (IMDUR) extended release tablet 60 mg, Daily  aspirin chewable tablet 81 mg, Daily  amLODIPine (NORVASC) tablet 10 mg, Daily  furosemide (LASIX) injection 40 mg, BID  sodium chloride flush 0.9 % injection 10 mL, 2 times per day  sodium chloride flush 0.9 % injection 10 mL, PRN  0.9 % sodium chloride infusion, PRN  ondansetron (ZOFRAN) injection 4 mg, Q4H PRN  polyethylene glycol (GLYCOLAX) packet 17 g, Daily PRN  acetaminophen (TYLENOL) tablet 650 mg, Q4H PRN   Or  acetaminophen (TYLENOL) suppository 650 mg, Q4H PRN  enoxaparin (LOVENOX) injection 40 mg, Daily  hydrALAZINE (APRESOLINE) tablet 100 mg, BID    Allergies:  Escitalopram, Flexeril [cyclobenzaprine hcl], Hydrocod polst-cpm polst er, Hydrocodone, Lipitor, Sudafed [pseudoephedrine hcl], and Tussionex pennkinetic er [facility] polst-cpm polst er]     Review of Systems:   · Constitutional: no unanticipated weight loss. There's been no change in energy level, sleep pattern, or activity level. No fevers, chills. · Eyes: No visual changes or diplopia. No scleral icterus. · ENT: No Headaches, hearing loss or vertigo. No mouth sores or sore throat.   · Cardiovascular: as reviewed in HPI  · Respiratory: No cough or wheezing, no sputum production. No hemoptysis. · Gastrointestinal: No abdominal pain, appetite loss, blood in stools. No change in bowel or bladder habits. · Genitourinary: No dysuria, trouble voiding, or hematuria. · Musculoskeletal:  No gait disturbance, no joint complaints. · Integumentary: No rash or pruritis. · Neurological: No headache, diplopia, change in muscle strength, numbness or tingling. · Psychiatric: No anxiety or depression. · Endocrine: No temperature intolerance. No excessive thirst, fluid intake, or urination. No tremor. · Hematologic/Lymphatic: No abnormal bruising or bleeding, blood clots or swollen lymph nodes. · Allergic/Immunologic: No nasal congestion or hives. Objective:   PHYSICAL EXAM:    Vitals:    06/27/21 0822   BP: (!) 175/82   Pulse: 50   Resp: 16   Temp: 98 °F (36.7 °C)   SpO2: 94%    Weight: 142 lb 6.7 oz (64.6 kg)       General Appearance:  Alert, cooperative. Thin. Elderly. Head:  Normocephalic, without obvious abnormality, atraumatic. Eyes:  Pupils equal and round. No scleral icterus. Mouth: Moist mucosa, no pharyngeal erythema. Nose: Nares normal. No drainage or sinus tenderness. Neck: Supple, symmetrical, trachea midline. No adenopathy. No tenderness/mass/nodules. No carotid bruit or elevated JVD. Lungs:   Respirations unlabored at rest.  Diminished breath sounds at bases. Chest Wall:  No tenderness or deformity. Heart:   Irregularly irregular and bradycardic. S1/S2 normal. No murmur, rub, or gallop. Abdomen:   Soft, non-tender, bowel sounds active. Musculoskeletal:  + Muscle wasting. No digital clubbing. Extremities: Extremities normal, atraumatic. No cyanosis. 1+ BLE edema. Pulses: 2+ radial and carotid pulses, symmetric. Skin: No rashes or lesions. Pysch: Normal mood and affect. Alert and oriented x 2. Neurologic: Moves all extremities. Follows simple commands. Hard of hearing.       Labs     CBC:   Lab Results   Component Telemetry: Personally interpreted. Slow atrial fibrillation. Assessment and Plan   1) Sick sinus syndrome/Permanent atrial fibrillation w slow ventricular response. Denies any episodes of lightheadedness or syncope. Treatment options for atrial fibrillation discussed including rate control and anticoagulation. Avoid AV maxx blockers with bradycardia. Due to advanced age and dementia, will continue to hold off on anticoagulation. Although he may be symptomatic from the bradycardia, he does not want invasive procedures such as a pacemaker and family honors his wishes.       2) Acute on chronic right-sided heart failure/Cor pulmonale/pulmonary hypertension. BNP above baseline. In general, goals of care are comfort based. Regardless, will continue IV Lasix today.    3) CKD Stage IIIb. Continue to monitor with diuresis.    4) Elevated troponin. History is not consistent with ACS and flat trend is not consistent with ACS. Chronically elevated, denies any chest pains or worsening dyspnea. May be related to CKD in a patient with known CAD. He is not a candidate for angiography.       5) CAD s/p CABG. Asymptomatic. Continue with medical management and risk factor modification including aspirin. No statin with previous allergy to Lipitor. No B-blocker with bradycardia.    6) Essential hypertension. Liberalized BP goal is reasonable in a nonagenarian with dementia.    7) COPD. Will defer management to primary team.    8) Dementia. Comfort care measures and continue care at skilled facility. Overall, the problems requiring hospitalization are high in severity. Thank you for allowing us to participate in the care of Corina Rubio. Whitney Hawthorne.  [place] [address]  6/27/2021 10:14 AM

## 2021-06-27 NOTE — H&P
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    TIA (transient ischemic attack)     Urinary retention 6/6/2017    Vertigo        Past Surgical History:        Procedure Laterality Date    CARDIAC SURGERY      COLONOSCOPY      CORONARY ARTERY BYPASS GRAFT  12/2005    CYSTOSCOPY  04/14/2017    CYSTOSCOPY, EVACUATION OF CLOTS, FULGURATION, BLADDER BIOPSY    EYE SURGERY  1998    LEFT RETINAL TEAR    INNER EAR SURGERY  1999    FOR VERTIGO    LUNG BIOPSY  1971    wedge resection, RLL, benign     PROSTATE SURGERY  2001    redo TURP for cancer    SKIN BIOPSY      TURP  1981       Allergies:  Escitalopram, Flexeril [cyclobenzaprine hcl], Hydrocod polst-cpm polst er, Hydrocodone, Lipitor, Sudafed [pseudoephedrine hcl], and Tussionex pennkinetic er [hydrocod polst-cpm polst er]    Medications Prior to Admission:    Prior to Admission medications    Medication Sig Start Date End Date Taking? Authorizing Provider   gabapentin (NEURONTIN) 100 MG capsule Take 1 capsule by mouth 2 times daily for 30 days.  10/29/20 2/23/21  Hiren Guadarrama MD   Polyethylene Glycol 3350 (MIRALAX PO) Take 17 g by mouth    Historical Provider, MD   sertraline (ZOLOFT) 50 MG tablet Take 1 tablet by mouth daily 4/22/20   Rolo Mckeon MD   lisinopril (PRINIVIL;ZESTRIL) 10 MG tablet Take 1 tablet by mouth daily 4/22/20   Rolo Mckeon MD   isosorbide mononitrate (IMDUR) 60 MG extended release tablet Take 1 tablet by mouth daily Dr. Herve Cano  4/20/20 mucus membranes and septum. Pharynx: Dental Hygiene adequate. Normal buccal mucosa. Normal pharynx. Neck: no adenopathy, no carotid bruit, no JVD, supple, symmetrical, trachea midline and thyroid not enlarged, symmetric, no tenderness/mass/nodules  Lungs: clear to auscultation bilaterally and no use of accessory muscles. Heart: irregularly irregular, bradycardic, S1, S2 normal, no murmur, click, rub or gallop and normal apical impulse  Abdomen: soft, non-tender; bowel sounds normal; no masses, no organomegaly  Extremities: extremities atraumatic, no cyanosis, +edema and Homans sign is negative, no sign of DVT. Capillary Refill: Acceptable < 3 seconds   Peripheral Pulses: +3 easily felt, not easily obliterated with pressures   Skin: Skin color, texture, turgor normal. No rashes or lesions on exposed skin  Neurologic: Neurovascularly intact without any focal sensory/motor deficits. Cranial nerves: II-XII intact, grossly non-focal. Gait was not tested. Mental Status: Alert and oriented to self and place    CBC:   Recent Labs     06/26/21  1631   WBC 6.1   HGB 10.8*   *     BMP:    Recent Labs     06/26/21  1631      K 3.5      CO2 27   BUN 32*   CREATININE 1.3   GLUCOSE 115*     Troponin:   Recent Labs     06/26/21  1631   TROPONINI 0.05*     PT/INR:  No results found for: PTINR  U/A:    Lab Results   Component Value Date    LEUKOCYTESUR TRACE 03/04/2020    NITRITE N 11/21/2018    RBCUA >100 03/04/2020    SPECGRAV 1.025 03/04/2020    UROBILINOGEN 1.0 03/04/2020    BILIRUBINUR SMALL 03/04/2020    BILIRUBINUR N 11/21/2018    BLOODU LARGE 03/04/2020    GLUCOSEU Negative 03/04/2020    PROTEINU >=300 03/04/2020         RAD:   I have independently reviewed and interpreted the imaging studies below and based my recommendations to the patient on those findings.     XR CHEST PORTABLE    Result Date: 6/26/2021  EXAMINATION: ONE XRAY VIEW OF THE CHEST 6/26/2021 4:26 pm COMPARISON: Chest radiograph 07/25/2019 HISTORY: ORDERING SYSTEM PROVIDED HISTORY: SOB TECHNOLOGIST PROVIDED HISTORY: Reason for exam:->SOB Reason for Exam: SOB Acuity: Acute Type of Exam: Initial Additional signs and symptoms: Right side chest pain Relevant Medical/Surgical History: COPD FINDINGS: Changes of coronary bypass grafting. Overlying heart monitor leads. Small to moderate bilateral pleural effusions with underlying bibasilar airspace opacities. Suspected bilateral perihilar airspace opacities. Diffuse interstitial prominence with indistinct pulmonary vasculature and interlobular septal thickening. No definite findings of pneumothorax. Prominence of the pulmonary trunk, a finding that can be seen with pulmonary hypertension. Mildly prominent hilar and cardiac contours. Atherosclerotic calcification in the aorta. Calcified subcarinal lymph node, likely a sequela of prior granulomatous disease. Diffuse bony demineralization. No obvious acute fracture. Joints maintain anatomic alignment. 1. Interstitial pulmonary edema with possible perihilar alveolar edema suggestive of congestive heart failure given mild cardiomegaly and bilateral pleural effusions. Pneumonia could appear similar. 2. Small to moderate bilateral pleural effusions with underlying bibasilar passive atelectasis. Superimposed pneumonia and aspiration are not excluded. CT CHEST PULMONARY EMBOLISM W CONTRAST    Result Date: 6/26/2021  EXAMINATION: CTA OF THE CHEST 6/26/2021 6:22 pm TECHNIQUE: CTA of the chest was performed after the administration of intravenous contrast.  Multiplanar reformatted images are provided for review. MIP images are provided for review. Dose modulation, iterative reconstruction, and/or weight based adjustment of the mA/kV was utilized to reduce the radiation dose to as low as reasonably achievable.  COMPARISON: 12/02/2017 HISTORY: ORDERING SYSTEM PROVIDED HISTORY: sob/hypoxia TECHNOLOGIST PROVIDED HISTORY: Reason for exam:->sob/hypoxia Decision Support Exception - unselect if not a suspected or confirmed emergency medical condition->Emergency Medical Condition (MA) Reason for Exam: sob/ hypoxia Acuity: Acute Type of Exam: Initial FINDINGS: Pulmonary Arteries: The pulmonary arteries are mildly prominent centrally and well opacified with motion artifact partially limiting evaluation of the pulmonary arteries along the lower lobes posteriorly with no obvious filling defect or vessel cut off seen. Mediastinum: There is moderate calcified plaque throughout the aorta which is mildly dilated throughout with no aneurysm or dissection. There are small lymph nodes in the AP window and pretracheal region measuring up to 1 cm. The heart is mildly enlarged. There are postop changes along the mediastinum and sternum. Lungs/pleura: There are moderate pleural effusions layering posteriorly along the lung bases extending superiorly into the upper chest which is more prominent on the left with probable loculated fluid superiorly. There is moderate atelectasis and consolidation along the lung bases which is more prominent on the left with hazy ground-glass opacities extending into the lung bases anteriorly. There is biapical pleural thickening and scarring. The lungs are emphysematous. There is loculated fluid in the major fissure on the right inferiorly. No pulmonary nodule or mass can be seen. Soft tissue/bones: There are moderate compression fractures along the superior endplates of P73 and L1 which are more prominent with no displaced a retropulsed fragments Upper Abdomen: Limited images of the upper abdomen are unremarkable. Slightly limited exam due to the motion artifact along the lung bases with no obvious pulmonary embolus identified. Recommend clinical follow-up.  Mildly dilated and atherosclerotic thoracic aorta with no aneurysm or dissection Moderate bibasilar pleural effusions and associated moderate atelectasis and consolidations along the lung bases extending superiorly which is more prominent on the left with probable small loculated pleural fluid collections along the upper chest bilaterally. Recommend follow-up Borderline enlarged mediastinal lymph nodes which are probably reactive in etiology. Recommend follow-up. Status post CABG surgery and mild cardiomegaly which is unchanged. Mild compression fractures of T12 and L1, the age of which are indeterminate. EKG:   Read by ER in the absence of a Cardiologist shows  atrial fibrillation with a rate of 43  Axis is   Left axis deviation  QTc is  normal   LBBB  ST Segments: no acute change, no Sgarbossa criteria  No significant change from prior EKG dated 2/23/2021      Assessment:   Principal Problem:    Acute on chronic diastolic (congestive) heart failure (HCC)  Active Problems:    COPD (chronic obstructive pulmonary disease) ([facility])    Essential hypertension    Coronary artery disease involving native coronary artery of native heart without angina pectoris    Atrial fibrillation with slow ventricular response (HCC)    Hyperglycemia    Stage 3 chronic kidney disease (HCC)    Elevated troponin    Pleuritic chest pain    Chronic respiratory failure with hypoxia and hypercapnia ([facility])  Resolved Problems:    * No resolved hospital problems. *      Plan:       Acute on chronic diastolic (congestive) heart failure (HCC) - A 2D Echocardiogram on 03/21/2017 shows an EF of 55%. Diurese with IV Lasix. Monitor strict I&Os and daily weights. A low sodium fluid restricted diet has been ordered. COPD (chronic obstructive pulmonary disease) (HCC) with chronic respiratory failure with hypoxia and hypercapnia (HCC) - he wears 3 L oxygen per nasal cannula at baseline    Elevated troponin - chronic. He has right sided chest pain which is worse with taking a deep breath. Likely pleuritic.  Will recheck Troponin with am labs    Coronary artery disease involving native coronary artery of native heart without angina pectoris - will continue Aspirin. Atrial fibrillation with slow ventricular response (HCC) -  I was told that he was taken off anticoagulation at some point in the past due to hematuria  - Bradycardia     Chronic Renal Failure stage III - Renal function is at baseline and is stable; Monitor renal function and avoid Nephrotoxic agents as able   - Creatinine lower than baseline, likely secondary to fluid overload    Essential (primary) hypertension - continue home meds and monitor blood pressure    Dementia ([facility] [address] - patient lives on a memory unit in his nursing facility. We will provide supportive care            DVT Prophylaxis: Lovenox   Diet: ADULT DIET; Regular; 1500 ml  Code Status: DNR-CC  (Advanced care planning has been discussed with patient and/or responsible family member and is reflected in the code status.  Further orders associated with this have been entered if appropriate)    Disposition: Anticipate that patient will remain in the hospital for 2 to 3 days depending on further evaluation and clinical course    Please note that over 50 minutes was spent in evaluating the patient, review of records and results, discussion with staff/family, etc.    Lindsey Higgins MD, MD

## 2021-06-27 NOTE — PROGRESS NOTES
Patient A&O. No complaints at this time. Call light within reach, able to make needs knows, fall precautions in place. Will continue to monitor. .Electronically signed by Graciela Zuluaga RN on 6/27/2021 at 6:19 AM

## 2021-06-27 NOTE — PROGRESS NOTES
Pt admitted from ED to room 3131. Pt alert and oriented, admission assessment complete. Denies further needs, call light in reach, fall precautions in place. Will continue to monitor. Electronically signed by Jabier Hill RN on 6/26/2021 at 11:47 PM

## 2021-06-27 NOTE — PROGRESS NOTES
Patient in bed no complaints. All needs met. Fall prevention in place, call light within reach, bed in lowest position, gripper socks on.

## 2021-06-27 NOTE — CONSULTS
Clinical Pharmacy Note  Vancomycin Consult    Eladio Heimlich is a 80 y.o. male ordered Vancomycin for pneumonia; consult received from Dr. Samira Peterson to manage therapy. Also receiving cefepime.     Patient Active Problem List   Diagnosis    COPD (chronic obstructive pulmonary disease) ([facility]    Essential hypertension    Coronary artery disease involving native coronary artery of native heart without angina pectoris    Eczema    Central hearing loss    Anxiety    Recurrent major depressive disorder, in full remission ([facility]    Vertigo    Hypertrophy of prostate without urinary obstruction and other lower urinary tract symptoms (LUTS)    Allergic rhinitis    Osteoporosis DX -2.4 ()    History of prostate cancer  TURP, seeds    Colon polyp- last colon 09    Osteoarthritis, generalized    Hyperlipidemia LDL goal <130 due to age   [facility] History of TIAs    Dry eye    Needs flu shot    RLS (restless legs syndrome)    Hyperglycemia    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    Chronic right-sided heart failure (HCC)    Pulmonary hypertension (HCC)    Atrial fibrillation with slow ventricular response (HCC)    S/P CABG (coronary artery bypass graft)    Bradycardia    Urinary retention    Stage 3 chronic kidney disease (HCC)    Elevated troponin    Sick sinus syndrome (HCC)    Pleuritic chest pain    Back pain    Acute on chronic diastolic (congestive) heart failure (HCC)    Chronic respiratory failure with hypoxia and hypercapnia (HCC)    Dementia (HCC)       Allergies:  Escitalopram, Flexeril [cyclobenzaprine hcl], Hydrocod polst-cpm polst er, Hydrocodone, Lipitor, Sudafed [pseudoephedrine hcl], and Tussionex pennkinetic er [facility] polst-cpm polst er]     Temp max:  Temp (24hrs), Av.1 °F (36.7 °C), Min:97.5 °F (36.4 °C), Max:98.5 °F (36.9 °C)      Recent Labs     21  1631 21  0621   WBC 6.1 3.5*       Recent Labs     21  1631 21  0621   BUN 32* 34* CREATININE 1.3 1.5*         Intake/Output Summary (Last 24 hours) at 6/27/2021 1114  Last data filed at 6/27/2021 0858  Gross per 24 hour   Intake 300 ml   Output 1750 ml   Net -1450 ml       Culture Results:  Pending    Ht Readings from Last 1 Encounters:   06/26/21 5' 11\" (1.803 m)        Wt Readings from Last 1 Encounters:   06/27/21 142 lb 6.7 oz (64.6 kg)         Estimated Creatinine Clearance: 27 mL/min (A) (based on SCr of 1.5 mg/dL (H)). Assessment/Plan:  Vancomycin 1000 mg IV x 1 dose ordered. Regimen projects a trough level of 15-20 mg/L. Level ordered for 6/28/21 at 0600. Thank you for the consult.    Meron Peng, [address] [facility], PharmD, 6/27/2021 11:14 AM

## 2021-06-28 ENCOUNTER — APPOINTMENT (OUTPATIENT)
Dept: GENERAL RADIOLOGY | Age: 86
DRG: 291 | End: 2021-06-28
Payer: MEDICARE

## 2021-06-28 LAB
ANION GAP SERPL CALCULATED.3IONS-SCNC: 13 MMOL/L (ref 3–16)
BASOPHILS ABSOLUTE: 0 K/UL (ref 0–0.2)
BASOPHILS RELATIVE PERCENT: 0.6 %
BUN BLDV-MCNC: 37 MG/DL (ref 7–20)
CALCIUM SERPL-MCNC: 8.3 MG/DL (ref 8.3–10.6)
CHLORIDE BLD-SCNC: 106 MMOL/L (ref 99–110)
CO2: 28 MMOL/L (ref 21–32)
CREAT SERPL-MCNC: 1.7 MG/DL (ref 0.8–1.3)
EKG ATRIAL RATE: 47 BPM
EKG DIAGNOSIS: NORMAL
EKG Q-T INTERVAL: 530 MS
EKG QRS DURATION: 150 MS
EKG QTC CALCULATION (BAZETT): 447 MS
EKG R AXIS: -36 DEGREES
EKG T AXIS: 250 DEGREES
EKG VENTRICULAR RATE: 43 BPM
EOSINOPHILS ABSOLUTE: 0.1 K/UL (ref 0–0.6)
EOSINOPHILS RELATIVE PERCENT: 1.2 %
GFR AFRICAN AMERICAN: 46
GFR NON-AFRICAN AMERICAN: 38
GLUCOSE BLD-MCNC: 106 MG/DL (ref 70–99)
HCT VFR BLD CALC: 28.2 % (ref 40.5–52.5)
HEMOGLOBIN: 9.5 G/DL (ref 13.5–17.5)
LV EF: 33 %
LVEF MODALITY: NORMAL
LYMPHOCYTES ABSOLUTE: 1.4 K/UL (ref 1–5.1)
LYMPHOCYTES RELATIVE PERCENT: 23.3 %
MAGNESIUM: 2.3 MG/DL (ref 1.8–2.4)
MCH RBC QN AUTO: 31.5 PG (ref 26–34)
MCHC RBC AUTO-ENTMCNC: 33.6 G/DL (ref 31–36)
MCV RBC AUTO: 93.7 FL (ref 80–100)
MONOCYTES ABSOLUTE: 0.4 K/UL (ref 0–1.3)
MONOCYTES RELATIVE PERCENT: 7.1 %
MRSA SCREEN RT-PCR: NORMAL
NEUTROPHILS ABSOLUTE: 4 K/UL (ref 1.7–7.7)
NEUTROPHILS RELATIVE PERCENT: 67.8 %
PDW BLD-RTO: 14.8 % (ref 12.4–15.4)
PLATELET # BLD: 114 K/UL (ref 135–450)
PMV BLD AUTO: 8.1 FL (ref 5–10.5)
POTASSIUM REFLEX MAGNESIUM: 3.1 MMOL/L (ref 3.5–5.1)
RBC # BLD: 3.01 M/UL (ref 4.2–5.9)
SODIUM BLD-SCNC: 147 MMOL/L (ref 136–145)
VANCOMYCIN RANDOM: 8.7 UG/ML
WBC # BLD: 5.9 K/UL (ref 4–11)

## 2021-06-28 PROCEDURE — 83735 ASSAY OF MAGNESIUM: CPT

## 2021-06-28 PROCEDURE — 36415 COLL VENOUS BLD VENIPUNCTURE: CPT

## 2021-06-28 PROCEDURE — 97166 OT EVAL MOD COMPLEX 45 MIN: CPT

## 2021-06-28 PROCEDURE — 2580000003 HC RX 258: Performed by: INTERNAL MEDICINE

## 2021-06-28 PROCEDURE — 6370000000 HC RX 637 (ALT 250 FOR IP): Performed by: INTERNAL MEDICINE

## 2021-06-28 PROCEDURE — 80048 BASIC METABOLIC PNL TOTAL CA: CPT

## 2021-06-28 PROCEDURE — 99232 SBSQ HOSP IP/OBS MODERATE 35: CPT | Performed by: NURSE PRACTITIONER

## 2021-06-28 PROCEDURE — 71045 X-RAY EXAM CHEST 1 VIEW: CPT

## 2021-06-28 PROCEDURE — 97116 GAIT TRAINING THERAPY: CPT

## 2021-06-28 PROCEDURE — 6360000002 HC RX W HCPCS: Performed by: INTERNAL MEDICINE

## 2021-06-28 PROCEDURE — 85025 COMPLETE CBC W/AUTO DIFF WBC: CPT

## 2021-06-28 PROCEDURE — 97162 PT EVAL MOD COMPLEX 30 MIN: CPT

## 2021-06-28 PROCEDURE — 80202 ASSAY OF VANCOMYCIN: CPT

## 2021-06-28 PROCEDURE — 94640 AIRWAY INHALATION TREATMENT: CPT

## 2021-06-28 PROCEDURE — 97535 SELF CARE MNGMENT TRAINING: CPT

## 2021-06-28 PROCEDURE — 93010 ELECTROCARDIOGRAM REPORT: CPT | Performed by: INTERNAL MEDICINE

## 2021-06-28 PROCEDURE — 6370000000 HC RX 637 (ALT 250 FOR IP): Performed by: NURSE PRACTITIONER

## 2021-06-28 PROCEDURE — 1200000000 HC SEMI PRIVATE

## 2021-06-28 PROCEDURE — 93306 TTE W/DOPPLER COMPLETE: CPT

## 2021-06-28 PROCEDURE — 2700000000 HC OXYGEN THERAPY PER DAY

## 2021-06-28 PROCEDURE — 94760 N-INVAS EAR/PLS OXIMETRY 1: CPT

## 2021-06-28 RX ORDER — FUROSEMIDE 10 MG/ML
40 INJECTION INTRAMUSCULAR; INTRAVENOUS DAILY
Status: DISCONTINUED | OUTPATIENT
Start: 2021-06-28 | End: 2021-06-28

## 2021-06-28 RX ADMIN — IPRATROPIUM BROMIDE AND ALBUTEROL SULFATE 1 AMPULE: .5; 3 SOLUTION RESPIRATORY (INHALATION) at 20:58

## 2021-06-28 RX ADMIN — SERTRALINE 50 MG: 50 TABLET, FILM COATED ORAL at 09:29

## 2021-06-28 RX ADMIN — VANCOMYCIN HYDROCHLORIDE 1000 MG: 1 INJECTION, POWDER, LYOPHILIZED, FOR SOLUTION INTRAVENOUS at 09:29

## 2021-06-28 RX ADMIN — ASPIRIN 81 MG: 81 TABLET, CHEWABLE ORAL at 09:29

## 2021-06-28 RX ADMIN — IPRATROPIUM BROMIDE AND ALBUTEROL SULFATE 1 AMPULE: .5; 3 SOLUTION RESPIRATORY (INHALATION) at 07:58

## 2021-06-28 RX ADMIN — ISOSORBIDE MONONITRATE 60 MG: 60 TABLET, EXTENDED RELEASE ORAL at 09:29

## 2021-06-28 RX ADMIN — HYDRALAZINE HYDROCHLORIDE 100 MG: 50 TABLET, FILM COATED ORAL at 09:29

## 2021-06-28 RX ADMIN — AMLODIPINE BESYLATE 10 MG: 10 TABLET ORAL at 09:29

## 2021-06-28 RX ADMIN — CEFEPIME HYDROCHLORIDE 2000 MG: 2 INJECTION, POWDER, FOR SOLUTION INTRAVENOUS at 11:05

## 2021-06-28 RX ADMIN — Medication 10 ML: at 09:30

## 2021-06-28 RX ADMIN — POTASSIUM BICARBONATE 40 MEQ: 782 TABLET, EFFERVESCENT ORAL at 09:30

## 2021-06-28 RX ADMIN — HYDRALAZINE HYDROCHLORIDE 100 MG: 50 TABLET, FILM COATED ORAL at 21:17

## 2021-06-28 RX ADMIN — ENOXAPARIN SODIUM 30 MG: 30 INJECTION SUBCUTANEOUS at 09:29

## 2021-06-28 RX ADMIN — Medication 10 ML: at 21:19

## 2021-06-28 ASSESSMENT — PAIN SCALES - WONG BAKER: WONGBAKER_NUMERICALRESPONSE: 0

## 2021-06-28 ASSESSMENT — PAIN SCALES - GENERAL: PAINLEVEL_OUTOF10: 0

## 2021-06-28 NOTE — ACP (ADVANCE CARE PLANNING)
Advance Care Planning     Advance Care Planning Clinical Specialist  Conversation Note      Date of ACP Conversation: 6/28/21    Conversation Conducted with: patient and his son Gonzalo Lea Specialist: Eric Chua:     Current Designated Healthcare Decision Maker:     Primary Decision Maker: Suzie Liao - Child - [phone]    Secondary Decision Maker: Jaylene Barclay - Child - [phone]  Click here to complete Healthcare Decision Makers including section of the Healthcare Decision Maker Relationship (ie \"Primary\")      Care Preferences    Hospitalization: \"If your health worsens and it becomes clear that your chance of recovery is unlikely, what would your preference be regarding hospitalization? \"    Choice:  [] The patient wants hospitalization. [] The patient prefers comfort-focused treatment without hospitalization. Ventilation: \"If you were in your present state of health and suddenly became very ill and were unable to breathe on your own, what would your preference be about the use of a ventilator (breathing machine) if it were available to you? \"      If the patient would desire the use of ventilator (breathing machine), answer \"yes\". If not, \"no\": no    \"If your health worsens and it becomes clear that your chance of recovery is unlikely, what would your preference be about the use of a ventilator (breathing machine) if it were available to you? \"     Would the patient desire the use of ventilator (breathing machine)?: No      Resuscitation  \"CPR works best to restart the heart when there is a sudden event, like a heart attack, in someone who is otherwise healthy. Unfortunately, CPR does not typically restart the heart for people who have serious health conditions or who are very sick. \"    \"In the event your heart stopped as a result of an underlying serious health condition, would you want attempts to be made to restart your heart (answer \"yes\" for attempt to resuscitate) or would you prefer a natural death (answer \"no\" for do not attempt to resuscitate)? \" no       [x] Yes   [] No   Educated Patient / Shantel Avitia regarding differences between Advance Directives and portable DNR orders.     Length of ACP Conversation in minutes:  5 minutes    Conversation Outcomes:  [x] ACP discussion completed  [] Existing advance directive reviewed with patient; no changes to patient's previously recorded wishes  [] New Advance Directive completed  [] Portable Do Not Rescitate prepared for Provider review and signature  [] POLST/POST/MOLST/MOST prepared for Provider review and signature      Follow-up plan:    [] Schedule follow-up conversation to continue planning  [] Referred individual to Provider for additional questions/concerns   [] Advised patient/agent/surrogate to review completed ACP document and update if needed with changes in condition, patient preferences or care setting    [] This note routed to one or more involved healthcare providers              Electronically signed by Luther Pepe on 6/28/2021 at 1:42 PM  #[phone]

## 2021-06-28 NOTE — CONSULTS
PALLIATIVE MEDICINE CONSULTATION     Patient name:Collins Curtis   EFO:[id number]    :1926  Room/Bed:D2A-6461/3131-01   LOS: 2 days         Date of consult:2021    Consult Information  Palliative Medicine Consult performed by: RAYSHAWN Melchor CNP  CNP  Requested by: Dr Dulce Altamirano  Reason for consult: [address]    Number of admissions past 12 months: 0    ASSESSMENT/RECOMMENDATIONS     80 y.o. male with AMS, debility and dyspnea       Symptom Management:  1. AMS- pt has dementia at baseline no acute change reported by LT facility  2. Debility- Pt has increased weakness per LT facility with falls at facility  3. Dyspnea- pt is on 3L at baseline admitted with acute exacerbation of COPD  4. Goals of Care- pt is confused and unable to participate meaningfully in conversation he is [facility] at baseline he asked that I call his daughter Keith Holm I left her a voice mail message to discuss [address]. Patient/Family Goals of Care :    pt is confused and unable to participate meaningfully in conversation he is [facility] at baseline he asked that I call his daughter Keith Holm I left her a voice mail message to discuss [address]. --Addendum:  Pts daughter returned my call she is agreeable to hospice level of care as recommended by Dr Dulce Altamirano. She would prefer whoever [facility] recommends. Notified [facility] to please follow-up on Hospice choice. Disposition/Discharge Plan:   pending    Advance Directives:  · Surrogate Decision Maker: Nayeli-daughter  · Code status:  DNR-CC    Case discussed with: patient, floor RN, Dr Dulce Altamirano, left message for Nayeli-daughter  Thank you for allowing us to participate in the care of this patient.       HISTORY     CC:   HPI: The patient is a 80 y.o. male with history of dementia (lives on memory unit), hypertension, coronary artery disease s/p CABG, chronically elevated troponin, A fib with slow ventricular response, CKD3, and COPD with chronic respiratory failure requiring 3 L of oxygen at baseline. He was sent from his nursing facility for evaluation following a 1 week history of worsening shortness of breath and RUQ abdominal pain (the pain is actually chest pain). Shortness of breath is severe, worse with minimal exertion and improved with rest.  His chest pain seems pleuritic in nature and is worse when he takes a deep breath. He was found to have an acute CHF exacerbation. Palliative Medicine SymptomScreening/ROS:  Review of Systems - History obtained from chart review and unobtainable from patient due to mental status    Patient unable to complete full ROS due to current cognitive status. Information that is obtained from nursing and chart.        Pain:  Denies     Home med list and hospital medications reviewed in chart as of 6/28/2021     EXAM     Vitals:    06/28/21 0848   BP: (!) 168/60   Pulse: (!) 49   Resp:    Temp:    SpO2: 97%       Physical Examination: General appearance - chronically ill appearing  Mental status - oriented to person only  Neck - supple, no significant adenopathy  Chest - diminised auscultation, no wheezes, rales or rhonchi, symmetric air entry  Abdomen - soft, nontender, nondistended, no masses or organomegaly  Musculoskeletal - no joint tenderness, deformity or swelling  Skin - normal coloration and turgor, no rashes, no suspicious skin lesions noted       Current labs in the epic chart reviewed as of 6/28/2021   Review of previous notes, admits, labs, radiology and testing relevant to this consult done in this chart today 6/28/2021    Signed By: Electronically signed by RAYSHAWN Sparks CNP on 6/28/2021 at 12:28 PM  Palliative Medicine   [phone]    June 28, 2021

## 2021-06-28 NOTE — CARE COORDINATION
6/28 Hospice order noted-- spoke w/ patient's daughter Pham Byrd) # [phone]- she is aware of Hospice consult and prefers referral to [facility] notified and will arrange meeting with family.   Electronically signed by Ruth Garrido on 6/28/2021 at 3:52 PM  [phone]

## 2021-06-28 NOTE — PLAN OF CARE
Problem: Falls - Risk of:  Goal: Will remain free from falls  Description: Will remain free from falls  Outcome: Ongoing  Note: Patient educated on fall prevention. Call light is within reach, bed locked in lowest position, personal items within reach, and bed alarm is on. Will round on patient per unit guidelines. Goal: Absence of physical injury  Description: Absence of physical injury  Outcome: Ongoing  Note: Pt is free of injury. No injury noted. Fall precautions in place. Call light within reach. Will monitor. Problem: Safety:  Goal: Free from accidental physical injury  Description: Free from accidental physical injury  Outcome: Ongoing  Note: Bed in lowest position, wheels locked, bed/chair exit alarm in place, call light within reach, and non skid footwear on. Walkway free of clutter. Pt alert and oriented and able to make needs known. Pt educated to use call light when needing to get up, and pt utilizes call light to make needs known. Will continue to monitor. Goal: Free from intentional harm  Description: Free from intentional harm  Outcome: Ongoing  Note: Pt is free of intentional harm. No intentions noted. Will monitor. Problem: Daily Care:  Goal: Daily care needs are met  Description: Daily care needs are met  Outcome: Ongoing  Note: Patient assessed to determine ability to perform daily needs and ADLs. Patient assisted with any daily needs that were not able to be met individually by patient. Parker encouraged, although patient educated to ask for assistance when needed. Will continue to monitor and assist patient with meeting daily care needs as needed. Problem: Skin Integrity:  Goal: Skin integrity will stabilize  Description: Skin integrity will stabilize  Outcome: Ongoing  Note: Portillo score assessed. Patient able to ambulate and turn self. Repositioned patient Q2H and assessed skin. Educated patient on importance of repositioning to prevent skin issues.

## 2021-06-28 NOTE — PROGRESS NOTES
Occupational Therapy   Occupational Therapy Initial Assessment  Date: 2021   Patient Name: Yudy Rodriguez  MRN: [id number]     : 1926    Date of Service: 2021    Assessment: Pt is 80 y.o. M who presents with worsening shortness of breath and RUQ abdominal pain (the pain is actually chest pain). Pt is being treated for CHF exacerbation. PTA pt lives on memory care unit at [facility] - reports he completes functional mobility with RW and self-care tasks without assist. Currently, pt presents with ROM/strength in [facility] for self-care and transfers. Pt completed bed mobility with SBA and sit <> stand transfers with min A. Pt completed functional mobility with RW with CGA/min A to navigate RW. Pt required min A for toileting needs - anticipate requiring min A for ADL needs at this time. Anticipate pt to return to [facility] with therapy at the discretion of the facility. Discharge Recommendations:  ECF with OT, ECF without OT       Assessment   Performance deficits / Impairments: Decreased functional mobility ; Decreased balance;Decreased ADL status; Decreased endurance;Decreased strength  Assessment: Pt is 80 y.o. M who presents with worsening shortness of breath and RUQ abdominal pain (the pain is actually chest pain). Pt is being treated for CHF exacerbation. PTA pt lives on memory care unit at [facility] - reports he completes functional mobility with RW and self-care tasks without assist. Currently, pt presents with ROM/strength in [facility] for self-care and transfers. Pt completed bed mobility with SBA and sit <> stand transfers with min A. Pt completed functional mobility with RW with CGA/min A to navigate RW. Pt required min A for toileting needs - anticipate requiring min A for ADL needs at this time. Anticipate pt to return to [facility] with therapy at the discretion of the facility.   Prognosis: Fair  Decision Making: Medium Complexity  History: PMH: hypertension, coronary artery disease s/p CABG, chronically elevated troponin, A fib with slow ventricular response, CKD3, and COPD with chronic respiratory failure requiring 3 L of oxygen at baseline  Exam: ADLs, transfers, func mob, bed mob  OT Education: OT Role;Transfer Training;Plan of Care;Precautions; ADL Adaptive Strategies  REQUIRES OT FOLLOW UP: Yes  Activity Tolerance  Activity Tolerance: Patient Tolerated treatment well  Safety Devices  Safety Devices in place: Yes (RN (Rosalind/Lizz) aware)  Type of devices: Nurse notified;Gait belt;Call light within reach; Left in chair           Patient Diagnosis(es): The primary encounter diagnosis was Acute on chronic congestive heart failure, unspecified heart failure type ([address] Diagnoses of Hypoxia and Symptomatic bradycardia were also pertinent to this visit. has a past medical history of A-fib ([address] Abnormal CXR (chest x-ray), Acute respiratory failure ([address] Allergic rhinitis, Anxiety, Arthritis, Atrial fibrillation ([address] CAD (coronary artery disease), Carotid artery disease- LICA < 33% (repeat 4/72), Central hearing loss, CHF (congestive heart failure) ([address] Chronic kidney disease, Colon polyp- last colon 5/22/09, COPD (chronic obstructive pulmonary disease) ([address] Dementia ([address] Dementia ([address] Depression, Diet-controlled diabetes mellitus ([address] Dizziness, Eczema, GERD (gastroesophageal reflux disease), Groin strain, Headache, Herpes zoster, HTN (hypertension), Hyperlipidemia, Hypertrophy of prostate without urinary obstruction and other lower urinary tract symptoms (LUTS), Lung nodules, Osteoporosis, Pneumonia, Prostate cancer ([address] Pulmonary HTN ([address] RLS (restless legs syndrome), 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 has a past surgical history that includes Coronary artery bypass graft (12/2005); Inner ear surgery (1999); TURP (1981); eye surgery (1998); Prostate surgery (2001); Lung biopsy (1971); Cardiac surgery;  Cystoscopy (04/14/2017); Colonoscopy; and skin biopsy. Restrictions  Restrictions/Precautions  Restrictions/Precautions: Fall Risk  Position Activity Restriction  Other position/activity restrictions: very [place]    Subjective   General  Chart Reviewed: Yes  Patient assessed for rehabilitation services?: Yes  Additional Pertinent Hx: Pt is 80 y.o. M who presents with worsening shortness of breath and RUQ abdominal pain (the pain is actually chest pain). Pt is being treated for CHF exacerbation. PMH: hypertension, coronary artery disease s/p CABG, chronically elevated troponin, A fib with slow ventricular response, CKD3, and COPD with chronic respiratory failure requiring 3 L of oxygen at baseline  Family / Caregiver Present: No  Referring Practitioner: Felipe Roa MD  Diagnosis: CHF  Subjective  Subjective: Pt met bedside, agreeable for therapy evaluation and OOB activity.   Patient Currently in Pain:  (No complaints of pain at this time)  Vital Signs  Patient Currently in Pain:  (No complaints of pain at this time)     Social/Functional History  Social/Functional History  Type of Home: Facility ([facility])  Home Layout: One level  Home Access: Level entry  Bathroom Shower/Tub: Walk-in shower  Bathroom Toilet: Handicap height  Bathroom Equipment: Grab bars in shower, Grab bars around toilet  Bathroom Accessibility: Accessible  Home Equipment: Rolling walker  Receives Help From: Personal care attendant  ADL Assistance: Independent  Homemaking Responsibilities: No  Ambulation Assistance: Independent (with RW)  Transfer Assistance: Independent  Active : No  Additional Comments: questionable historian but pt reports being indep with mobility and ADLs; reports one recent fall       Objective   Vision: Within Functional Limits  Hearing: Exceptions to [facility]  Hearing Exceptions: Bilateral hearing aid;Hard of hearing/hearing concerns      Orientation  Overall Orientation Status: Within Functional Limits WFL          Plan   Plan  Times per week: 3-5  Current Treatment Recommendations: Strengthening, Endurance Training, Balance Training, Functional Mobility Training, Safety Education & Training, Equipment Evaluation, Education, & procurement, Patient/Caregiver Education & Training, Self-Care / ADL      AM-PAC Score        AM-PAC Inpatient Daily Activity Raw Score: 21 (06/28/21 1215)  AM-PAC Inpatient ADL T-Scale Score : 44.27 (06/28/21 1215)  ADL Inpatient CMS 0-100% Score: 32.79 (06/28/21 1215)  ADL Inpatient CMS G-Code Modifier : Tawana De La O (06/28/21 1215)    Goals  Short term goals  Time Frame for Short term goals: prior to d/c  Short term goal 1: Pt will complete functional mobility with RW with SBA  Short term goal 2: Pt will complete transfers with SBA  Short term goal 3: Pt will complete toileting with SBA  Short term goal 4: Pt will complete dressing with SBA  Short term goal 5: Pt will complete bathing with SBA  Patient Goals   Patient goals : \"to feel better\"       Therapy Time   Individual Concurrent Group Co-treatment   Time In       1015   Time Out       1045   Minutes       30   Timed Code Treatment Minutes: 15 Minutes (15 min eval)     If pt is discharged prior to next OT session, this note will serve as the discharge summary.     General Willingham, FERNANDO/P#[id number]

## 2021-06-28 NOTE — CARE COORDINATION
6/28 chart reviewed and spoke w/ patient and his son Mary Hoang. Patient resides in the memory unit at [facility] and plan is to return there at discharge. Electronically signed by Morris Lofton on 6/28/2021 at 1:39 PM  [phone]    6/28 spoke w/ Yessenia Roy at [facility] # [phone] confirmed patient is resident there at [facility] - patient lives in a memory unit in an assisted living apartment - she will have skilled bed at [facility] if needed.   Electronically signed by Morris Lofton on 6/28/2021 at 2:27 PM

## 2021-06-28 NOTE — PROGRESS NOTES
Physical Therapy    Facility/Department: [facility]  Initial Assessment    NAME: Cory Cat  : 1926  MRN: [id number]    Date of Service: 2021    Discharge Recommendations:  ECF with PT, Patient would benefit from continued therapy after discharge   PT Equipment Recommendations  Equipment Needed: No       Assessment   Body structures, Functions, Activity limitations: Decreased functional mobility   Assessment: Pt is a 79 y/o male admitted from memory care unit with CHF exacerbation. Pt is a poor historian but reports being indep ambulating with a RW at facility [facility]. Today, pt required [facility] for transfers and [facility] ambulating short distances with RW. Requires frequent cues for safety and hand placement. Pt will benefit from continued skilled PT at [facility] upon d/c to improve safety and independence with functional mobility. Will continue to follow. Treatment Diagnosis: difficulty walking  Prognosis: Fair  Decision Making: Medium Complexity  History: Per Antione Johnson MD \"Patient is a poor historian at this time, and information for this report is derived from conversation with the emergency room physician and review of the records. Pt is an 80y.o. year-old male with a history of dementia (lives on memory unit), hypertension, coronary artery disease s/p CABG, chronically elevated troponin, A fib with slow ventricular response, CKD3, and COPD with chronic respiratory failure requiring 3 L of oxygen at baseline. He was sent from his nursing facility for evaluation following a 1 week history of worsening shortness of breath and RUQ abdominal pain (the pain is actually chest pain). Shortness of breath is severe, worse with minimal exertion and improved with rest. His chest pain seems pleuritic in nature and is worse when he takes a deep breath. He was found to have an acute CHF exacerbation. He is being admitted for further evaluation and treatment.  Pt unable to reliably provide associated signs and Activity Restriction  Other position/activity restrictions: very [place]     Vision/Hearing  Vision: Within Functional Limits  Hearing: Exceptions to [facility]  Hearing Exceptions: Bilateral hearing aid;Hard of hearing/hearing concerns       Subjective  General  Chart Reviewed: Yes  Patient assessed for rehabilitation services?: Yes  Additional Pertinent Hx: Leonardo Joshi MD \"Patient is a poor historian at this time, and information for this report is derived from conversation with the emergency room physician and review of the records. Pt is an 80y.o. year-old male with a history of dementia (lives on memory unit), hypertension, coronary artery disease s/p CABG, chronically elevated troponin, A fib with slow ventricular response, CKD3, and COPD with chronic respiratory failure requiring 3 L of oxygen at baseline. He was sent from his nursing facility for evaluation following a 1 week history of worsening shortness of breath and RUQ abdominal pain (the pain is actually chest pain). Shortness of breath is severe, worse with minimal exertion and improved with rest.  His chest pain seems pleuritic in nature and is worse when he takes a deep breath. He was found to have an acute CHF exacerbation. He is being admitted for further evaluation and treatment. Pt unable to reliably provide associated signs and symptoms at this time due to altered mental status\"  Response To Previous Treatment: Not applicable  Family / Caregiver Present: No  Referring Practitioner: Marguerite Aparicio MD  Referral Date : 06/27/21  Diagnosis: CHF exacerbation  Follows Commands: Within Functional Limits  Subjective  Subjective: Pt pleasantly confused and agreeable to PT eval and treat. No c/o pain.           Orientation  Orientation  Overall Orientation Status: Impaired  Orientation Level: Oriented to place;Oriented to person;Disoriented to situation;Disoriented to time     Social/Functional History  Social/Functional History  Type of Home: Facility ([facility] care)  Home Layout: One level  Home Access: Level entry  Bathroom Shower/Tub: Walk-in shower  Bathroom Toilet: Handicap height  Bathroom Equipment: Grab bars in shower, Grab bars around toilet  Bathroom Accessibility: Solomon Farias: Rolling walker  Kelsie Pollo Help From: Personal care attendant  ADL Assistance: [address] [facility] Responsibilities: No  Ambulation Assistance: Independent (with RW)  Transfer Assistance: Independent  Active : No  Additional Comments: questionable historian but pt reports being indep with mobility and ADLs; reports one recent fall       Objective          PROM RLE (degrees)  RLE General PROM: knee ext limited by approx 15 deg  AROM RLE (degrees)  RLE General AROM: WFL except knee ext limited by approx 15 deg  PROM LLE (degrees)  LLE General PROM: knee ext limited by approx 10 deg  AROM LLE (degrees)  LLE General AROM: WFL except knee ext limited by approx 10 deg  Strength RLE  Comment: knee ext and ankle DF 4+/5  Strength LLE  Comment: knee ext and ankle DF 4+/5  Tone RLE  RLE Tone: Normotonic  Tone LLE  LLE Tone: Normotonic  Motor Control  Gross Motor?: WFL     Bed mobility  Supine to Sit: Stand by assistance (HOB elevated)  Sit to Supine: Unable to assess (up in chair at end of session)     Transfers  Sit to Stand: Minimal Assistance  Stand to sit: Minimal Assistance  Comment: cues for hand placement     Ambulation  Ambulation?: Yes  More Ambulation?: No  Ambulation 1  Surface: level tile  Device: Rolling Walker  Other Apparatus: O2 (therapist managed 02 line and IV)  Assistance: Contact guard assistance;Minimal assistance  Quality of Gait: forward flexed posture far behind walker base requiring cues to correct  Gait Deviations: Slow Ashley;Decreased step length;Decreased step height  Distance: 10' and then approx 25'  Stairs/Curb  Stairs?: No     Balance  Posture: Poor  Sitting - Static: Good  Standing - Static: Fair  Standing - Dynamic: [address] [facility] [address] Plan  Times per week: 3-5x/wk  Current Treatment Recommendations: Functional Mobility Training  Safety Devices  Type of devices:  All fall risk precautions in place, Call light within reach, Chair alarm in place, Gait belt, Left in chair, Nurse notified (TONY Izquierdo Senters notified)  Restraints  Initially in place: No      AM-PAC Score  AM-PAC Inpatient Mobility Raw Score : 18 (06/28/21 1101)  AM-PAC Inpatient T-Scale Score : 43.63 (06/28/21 1101)  Mobility Inpatient CMS 0-100% Score: 46.58 (06/28/21 1101)  Mobility Inpatient CMS G-Code Modifier : CK (06/28/21 1101)          Goals  Short term goals  Time Frame for Short term goals: upon d/c  Short term goal 1: bed mobility mod I  Short term goal 2: transfers supervision  Short term goal 3: ambulate 48' with LRAD and supervision  Long term goals  Time Frame for Long term goals : LTG=STG  Patient Goals   Patient goals : unable to state due to cognition       Therapy Time   Individual Concurrent Group Co-treatment   Time In 1015         Time Out 1045         Minutes 30         Timed Code Treatment Minutes: 15 Minutes         Electronically signed by Nate Covarrubias on 6/28/2021 at 11:04 AM

## 2021-06-28 NOTE — PROGRESS NOTES
Progress Note  Admit Date: 6/26/2021      PCP: Hallie Jordan     CC: F/U for loss of breath and abdominal pain    Days in hospital:  2    SUBJECTIVE / Interval History:  Pt feels ok, states SOB improved   R side CP better    Now on 2 L , baseline     Neg 1. 6 L     Patient does have memory issues and review of systems limited due to it    Family at bed side        Allergies  Escitalopram, Flexeril [cyclobenzaprine hcl], Hydrocod polst-cpm polst er, Hydrocodone, Lipitor, Sudafed [pseudoephedrine hcl], and Tussionex pennkinetic er [facility] polst-cpm polst er]    Medications    Scheduled Meds:   vancomycin  1,000 mg Intravenous Once    enoxaparin  30 mg Subcutaneous Daily    furosemide  40 mg Intravenous Daily    potassium bicarb-citric acid  40 mEq Oral Once    [START ON 6/29/2021] potassium bicarb-citric acid  20 mEq Oral Daily    cefepime  2,000 mg Intravenous Q24H    vancomycin (VANCOCIN) intermittent dosing (placeholder)   Other RX Placeholder    ipratropium-albuterol  1 ampule Inhalation BID    sertraline  50 mg Oral Daily    isosorbide mononitrate  60 mg Oral Daily    aspirin  81 mg Oral Daily    amLODIPine  10 mg Oral Daily    sodium chloride flush  10 mL Intravenous 2 times per day    hydrALAZINE  100 mg Oral BID     Continuous Infusions:   sodium chloride         PRN Meds:  albuterol, sodium chloride flush, sodium chloride, ondansetron, polyethylene glycol, acetaminophen **OR** acetaminophen    Vitals    BP (!) 168/60   Pulse (!) 49   Temp 97.4 °F (36.3 °C) (Oral)   Resp 14   Ht 5' 11\" (1.803 m)   Wt 143 lb 8.3 oz (65.1 kg)   SpO2 97%   BMI 20.02 kg/m²     Exam:    Gen: No distress. Eyes: PERRL. No sclera icterus. No conjunctival injection. ENT: No discharge. Pharynx clear. External appearance of ears and nose normal.  Neck: Trachea midline. No obvious mass. Resp: No accessory muscle use. No crackles. No wheezes. No rhonchi. No dullness on percussion. CV: Regular rate. Regular rhythm. No murmur or rub. trace edema. GI: Non-tender. Non-distended. No hernia. Skin: Warm, dry, normal texture and turgor. No nodule on exposed extremities. Lymph: No cervical LAD. No supraclavicular LAD. M/S: No cyanosis. No clubbing. No joint deformity. Neuro: Moves all four extremities. CN 2-12 tested, no defect noted. Psych: Oriented x 2. No anxiety. Data    LABS  CBC:   Recent Labs     06/26/21  1631 06/27/21  0621 06/28/21  0545   WBC 6.1 3.5* 5.9   HGB 10.8* 10.2* 9.5*   HCT 31.6* 30.0* 28.2*   MCV 93.8 93.9 93.7   * 117* 114*     BMP:   Recent Labs     06/26/21  1631 06/27/21  0621 06/28/21  0545    146* 147*   K 3.5 3.7 3.1*    106 106   CO2 27 28 28   BUN 32* 34* 37*   CREATININE 1.3 1.5* 1.7*   GLUCOSE 115* 124* 106*     POC GLUCOSE:  No results for input(s): POCGLU in the last 72 hours. LIVER PROFILE:   Recent Labs     06/26/21  1631   AST 18   ALT 11   LIPASE 34.0   LABALBU 4.0   BILITOT 0.8   ALKPHOS 118     PT/INR: No results for input(s): PROTIME, INR in the last 72 hours. APTT: No results for input(s): APTT in the last 72 hours. UA:No results for input(s): NITRITE, COLORU, PHUR, LABCAST, WBCUA, RBCUA, MUCUS, TRICHOMONAS, YEAST, BACTERIA, CLARITYU, SPECGRAV, LEUKOCYTESUR, UROBILINOGEN, BILIRUBINUR, BLOODU, GLUCOSEU, KETUA, AMORPHOUS in the last 72 hours. Microbiology:  Wound Culture: No results for input(s): WNDABS, ORG in the last 72 hours. Invalid input(s):  LABGRAM  Nasal Culture: No results for input(s): ORG, MRSAPCR in the last 72 hours. Blood Culture: No results for input(s): BC, BLOODCULT2 in the last 72 hours. Fungal Culture:   No results for input(s): FUNGSM in the last 72 hours. No results for input(s): FUNCXBLD in the last 72 hours. CSF Culture:  No results for input(s): COLORCSF, APPEARCSF, CFTUBE, CLOTCSF, WBCCSF, RBCCSF, NEUTCSF, NUMCELLSCSF, LYMPHSCSF, MONOCSF, GLUCCSF, VOLCSF in the last 72 hours. Respiratory Culture:   No results for input(s): [place] in the last 72 hours. AFB:No results for input(s): AFBSMEAR in the last 72 hours. Urine Culture  No results for input(s): LABURIN in the last 72 hours. RADIOLOGY:    CT CHEST PULMONARY EMBOLISM W CONTRAST   Final Result   Slightly limited exam due to the motion artifact along the lung bases with no   obvious pulmonary embolus identified. Recommend clinical follow-up. Mildly dilated and atherosclerotic thoracic aorta with no aneurysm or   dissection      Moderate bibasilar pleural effusions and associated moderate atelectasis and   consolidations along the lung bases extending superiorly which is more   prominent on the left with probable small loculated pleural fluid collections   along the upper chest bilaterally. Recommend follow-up      Borderline enlarged mediastinal lymph nodes which are probably reactive in   etiology. Recommend follow-up. Status post CABG surgery and mild cardiomegaly which is unchanged. Mild compression fractures of T12 and L1, the age of which are indeterminate. XR CHEST PORTABLE   Final Result   1. Interstitial pulmonary edema with possible perihilar alveolar edema   suggestive of congestive heart failure given mild cardiomegaly and bilateral   pleural effusions. Pneumonia could appear similar. 2. Small to moderate bilateral pleural effusions with underlying bibasilar   passive atelectasis. Superimposed pneumonia and aspiration are not excluded.              CONSULTS:    IP CONSULT TO HOSPITALIST  IP CONSULT TO CARDIOLOGY  IP CONSULT TO PALLIATIVE CARE  PHARMACY TO DOSE VANCOMYCIN  IP CONSULT TO PULMONOLOGY    ASSESSMENT AND PLAN:      Principal Problem:    Acute on chronic diastolic (congestive) heart failure (HCC)  Active Problems:    COPD (chronic obstructive pulmonary disease) ([address]    Essential hypertension    Coronary artery disease involving native coronary artery of native heart without angina pectoris    Cor pulmonale (chronic) (HCC)    Permanent atrial fibrillation (HCC)    Acute on chronic right-sided heart failure (HCC)    Hyperglycemia    Stage 3 chronic kidney disease (HCC)    Elevated troponin    Pleuritic chest pain    Chronic respiratory failure with hypoxia and hypercapnia (HCC)    Dementia (HCC)  Resolved Problems:    * No resolved hospital problems. *    Patient is a 58-year-old male with a past medical history of 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 fib, CKD, COPD, chronic respiratory failure needing 3 L of oxygen was sent from his nursing facility for 1 week of shortness of breath and right upper quadrant pain. Patient was admitted with CHF exacerbation    CHF exacerbation, acute on CHr R sided HF with pulm HTN- creat  trending up, hold lasix today   -Possible diastolic heart failure  Last echo showed a EF of-55%  -DESTIN and daily weight    Pneumonia  -Patient is from a nursing home. CT shows effusion with consolidation  -Procalcitonin ok   -Start broad-spectrum antibiotics  - treat as MRSA/ gram neg    Pleural effusion- ct diuresis  CT shows possible loculation-  -we will consult pulmonology      COPD with chronic respiratory failure  -Patient has chronic hypoxia and uses 3 L of oxygen at baseline    Coronary artery disease  -Continue aspirin    A. fib with slow ventricular response  Patient is not on anticoagulation due to hematuria-    Chronic kidney disease stage III  -Baseline creatinine 1.3-1.5  -hold lisinopril with diuresis    Hypertension  -Monitor blood pressure and continue home meds    Dementia  -Supportive care    Patient is a DNR CC per records will consult palliative care        DVT Prophylaxis: lovenox  Diet: ADULT DIET; Regular;  Low Sodium (2 gm); 1500 ml  Code Status: DNR-CC    PT/OT Eval Status:ordered    Discharge plan - ct care    The patient and / or the family were informed of the results of any tests, a time was given to answer questions, a plan was proposed and they agreed with plan. Discussed with consulting physicians, nursing and social work     The note was completed using EMR. Every effort was made to ensure accuracy; however, inadvertent computerized transcription errors may be present.        Ismael Mckay MD

## 2021-06-28 NOTE — CARE COORDINATION
Family meeting tomorrow [phone] for goal of return to [facility] with HOC. Lito Roberts RN, [address], [facility], [address] [place] [address]   O: [phone]  C: [phone]  F: [phone]   Main & Referrals: [phone]   [email or web address]

## 2021-06-28 NOTE — PROGRESS NOTES
Brief Nutrition Note      Pt seen per nutrition SOC for CHF education. Pt from ECF where diet needs are managed. No other nutrition-related risk factors identified. Will see at LOS per nutrition SOC. Please consult if nutrition intervention is needed sooner.       Electronically signed by Sofy Lew RD, PRATIMA on 6/28/2021 at 10:09 AM

## 2021-06-28 NOTE — RT PROTOCOL NOTE
RT Nebulizer Bronchodilator Protocol Note    There is a bronchodilator order in the chart from a provider indicating to follow the RT Bronchodilator Protocol and there is an Initiate RT Bronchodilator Protocol order as well (see protocol at bottom of note). The findings from the last RT Protocol Assessment were as follows:  Smoking: <15 Pack years  Surgical Status: No surgery  Xray: Multiple lobe infiltrates/atelectasis/pleural effusion/edema  Respiratory Pattern: RR 12-20  Mental Status: Alert and Oriented  Breath Sounds: Diminished and/or crackles  Cough: Weak, non-productive  Activity Level: Mostly sedentary, minimal walking  Oxygen Requirement: Room Air - 2LNC/28% or home setting  Indication for Bronchodilator Therapy: Decreased or absent breath sounds  Bronchodilator Assessment Score: 8    Aerosolized bronchodilator medication orders have been revised according to the RT Bronchodilator Protocol. RT Bronchodilator Protocol:    Respiratory Therapist to perform RT Therapy Protocol Assessment then follow the protocol. No Indications - adjust the frequency to every 6 hours PRN wheezing or bronchospasm, if no treatments needed after 48 hours then discontinue using Per Protocol order mode. If indication present, adjust the RT bronchodilator orders based on the Bronchodilator Assessment Score as follows:    0-6 - enter or revise RT Bronchodilator order to Albuterol Nebulizer order with frequency of every 2 hours PRN for wheezing or increased work of breathing using Per Protocol order mode. Repeat RT Therapy Protocol Assessment as needed. 7-10 - discontinue any other Inpatient aerosolized bronchodilator medication orders and enter or revise two Albuterol Nebulizer orders, one with BID frequency and one with frequency of every 2 hours PRN wheezing or increased work of breathing using Per Protocol order mode. Repeat RT Therapy Protocol Assessment with second treatment then BID and as needed.       11-13 - discontinue any other Inpatient aerosolized bronchodilator medication orders and enter DuoNeb Nebulizer order with QID frequency and an Albuterol Nebulizer order with frequency of every 2 hours PRN wheezing or increased work of breathing using Per Protocol order mode. Repeat RT Therapy Protocol Assessment with second treatment then QID and as needed. Greater than 13 - discontinue any other Inpatient aerosolized bronchodilator medication orders and enter a DuoNeb Nebulizer order with every 4 hours frequency and an Albuterol Nebulizer order with frequency of every 2 hours PRN wheezing or increased work of breathing using Per Protocol order mode. Repeat RT Therapy Protocol Assessment with second treatment then every 4 hours and as needed. RT to enter RT Home Evaluation for COPD & MDI Assessment order using Per Protocol order mode.     Electronically signed by Dennys Haines RCP on 6/27/2021 at 9:40 PM

## 2021-06-28 NOTE — PROGRESS NOTES
Patient is resting in bed and is alert and oriented to self, place, and time. Patient is on 1L of oxygen now. Patient complaining of SOB intermittently but O2 levels have been within range. Patient is extremely hard of hearing. Head to toe assessment done and all morning meds are given. All needs are met ans safety precautions are in place. Will monitor and assess.   Electronically signed by Evita Johnston RN on 6/28/2021 at 10:00 AM

## 2021-06-28 NOTE — PROGRESS NOTES
Pulmonary/Critical Care Progress Note    CC:  Follow-up:  Acute hypoxic respiratory failure  Pleural effusion  Acute systolic and diastolic heart failure  Lung nodules  Pulmonary hypertension  Hypokalemia    Subjective: Weaned to 1L NC  Had 1.1L UOP with lasix  Having a non-productive cough  SOB improved  Up in chair    ROS  No fever  SOB improved  + cough    Intake/Output Summary (Last 24 hours) at 6/28/2021 1212  Last data filed at 6/27/2021 2136  Gross per 24 hour   Intake 300 ml   Output 525 ml   Net -225 ml         PHYSICAL EXAM:  Blood pressure (!) 168/60, pulse (!) 49, temperature 97.4 °F (36.3 °C), temperature source Oral, resp. rate 14, height 5' 11\" (1.803 m), weight 143 lb 8.3 oz (65.1 kg), SpO2 97 %.'  Gen: No distress. Well developed, well-nourished  Eyes: No scleral icterus. No conjunctival injection. ENT: External appearance of ears and nose normal. Hard of hearing  Neck: Trachea midline. No obvious mass. No visible thyroid enlargement     Respiratory: Faint crackles in bilateral bases. No wheezes. No rhonchi. No accessory muscle use  Cardiovascular: Regular rate. Regular rhythm. No murmur or rub. No edema  GI: Non-tender. Non-distended. No hernia. Bowel sounds present  Skin: Warm, dry, normal texture and turgor. No abnormalities on exposed extremities. Musculoskeletal: No cyanosis. No clubbing. No joint deformity. Neuro: Moves all four extremities.    Psychiatric:  Normal mood and affect; exhibits normal insight and judgement     Medications:    Scheduled Meds:   enoxaparin  30 mg Subcutaneous Daily    [Held by provider] furosemide  40 mg Intravenous Daily    [START ON 6/29/2021] potassium bicarb-citric acid  20 mEq Oral Daily    cefepime  2,000 mg Intravenous Q24H    vancomycin (VANCOCIN) intermittent dosing (placeholder)   Other RX Placeholder    ipratropium-albuterol  1 ampule Inhalation BID    sertraline  50 mg Oral Daily    isosorbide mononitrate  60 mg Oral Daily    aspirin  81 mg Oral Daily    amLODIPine  10 mg Oral Daily    sodium chloride flush  10 mL Intravenous 2 times per day    hydrALAZINE  100 mg Oral BID       Continuous Infusions:   sodium chloride         PRN Meds:  albuterol, sodium chloride flush, sodium chloride, ondansetron, polyethylene glycol, acetaminophen **OR** acetaminophen    Labs:  CBC:   Recent Labs     06/26/21  1631 06/27/21  0621 06/28/21  0545   WBC 6.1 3.5* 5.9   HGB 10.8* 10.2* 9.5*   HCT 31.6* 30.0* 28.2*   MCV 93.8 93.9 93.7   * 117* 114*     BMP:   Recent Labs     06/26/21  1631 06/27/21  0621 06/28/21  0545    146* 147*   K 3.5 3.7 3.1*    106 106   CO2 27 28 28   BUN 32* 34* 37*   CREATININE 1.3 1.5* 1.7*     LIVER PROFILE:   Recent Labs     06/26/21  1631   AST 18   ALT 11   LIPASE 34.0   BILITOT 0.8   ALKPHOS 118     PT/INR: No results for input(s): PROTIME, INR in the last 72 hours. APTT: No results for input(s): APTT in the last 72 hours. UA:No results for input(s): NITRITE, COLORU, PHUR, LABCAST, WBCUA, RBCUA, MUCUS, TRICHOMONAS, YEAST, BACTERIA, CLARITYU, SPECGRAV, LEUKOCYTESUR, UROBILINOGEN, BILIRUBINUR, BLOODU, GLUCOSEU, AMORPHOUS in the last 72 hours. Invalid input(s): [employer]  No results for input(s): PH, PCO2, PO2 in the last 72 hours.     Films:  Chest imaging and associated reports were personally reviewed and showed CT Chest 6/26:  Slightly limited exam due to the motion artifact along the lung bases with no   obvious pulmonary embolus identified.  Recommend clinical follow-up.       Mildly dilated and atherosclerotic thoracic aorta with no aneurysm or   dissection       Moderate bibasilar pleural effusions and associated moderate atelectasis and   consolidations along the lung bases extending superiorly which is more   prominent on the left with probable small loculated pleural fluid collections   along the upper chest bilaterally.  Recommend follow-up       Borderline enlarged mediastinal lymph nodes which are probably reactive in   etiology.  Recommend follow-up.       Status post CABG surgery and mild cardiomegaly which is unchanged.       Mild compression fractures of T12 and L1, the age of which are indeterminate. ABG:  No study    Cultures:  COVID19: negative    ECHO  2017   Summary   Left ventricle size is normal. Mild concentric left ventricular hypertrophy   is present. Ejection fraction is visually estimated to be 55%. No regional   wall motion abnormalities are noted. Patient appears to be in atrial   fibrillation. The right ventricle is moderately dilated. Right ventricular systolic   function appears mildly reduced. The left atrium is mildly dilated. The right atrium is moderately dilated. Mild aortic, mitral, pulmonic regurgitation. There is moderate tricuspid regurgitation. Estimated pulmonary artery systolic pressure is elevated at 64 mmHg assuming   a right atrial pressure of 8 mmHg. There is a large pleural effusion. PFTs  None    Assessment/Plan:     Acute hypoxic respiratory failure  Maintain oxygen saturations >90% with supplemental oxygen and wean as tolerated  Duonebs    Pleural effusion  CXR today  Hold lasix with increase in creatinine    Acute systolic and diastolic heart failure  Order echo  Cardiology following  Diuresis as tolerated--holding with increase in creatinine  Antihypertensives    Lung nodules  Will need monitoring    Pulmonary hypertension  Diuresis as tolerated    Hypokalemia  PO replacement    Palliative care consulted by  Dr. Bill Peralta for goals of care     DVT prophylaxis with lovenox     Thank you for allowing me to participate in the care of this patient. Will follow.     Sara Crowell, ACNP  [place] [facility]

## 2021-06-28 NOTE — PROGRESS NOTES
[facility], [place], operated by [facility]   Daily Progress Note      Admit Date:  6/26/2021    Reason for follow up visit: CHF; bradycardia    CC: \"I'm okay. I'm still alive. \"    81 y/o male with PMH significant for chronic atrial fib, CAD, CHF, CKD, dementia, pulmonary HTN, HTN and HLP who was admitted to [facility] after presenting from the nursing home with abdominal pain and SOB. He c/o R side chest pain with deep breaths on admission but denies today. Plan is for comfort based care as previously outlined by the family. Interval History:  Pt. seen and examined; records reviewed  BP stable. Remains on O2 @ 2L  Denies chest pain or SOB this AM  Remains bradycardic  K+ 3.1  Cr 1.7   Net diuresis -1.6 L since admit    Subjective:  Pt with no acute overnight cardiac events. Denies chest pain, SOB, palpitations or dizziness  + cough  Pt poor historian and unable to obtain ROS; no family members present    Objective:   BP (!) 151/56   Pulse (!) 43   Temp 98.4 °F (36.9 °C) (Oral)   Resp 16   Ht 5' 11\" (1.803 m)   Wt 143 lb 8.3 oz (65.1 kg)   SpO2 98%   BMI 20.02 kg/m²       Intake/Output Summary (Last 24 hours) at 6/28/2021 0813  Last data filed at 6/27/2021 2136  Gross per 24 hour   Intake 540 ml   Output 1075 ml   Net -535 ml     Wt Readings from Last 3 Encounters:   06/28/21 143 lb 8.3 oz (65.1 kg)   02/23/21 153 lb (69.4 kg)   08/24/20 165 lb (74.8 kg)       Physical Exam:  General: In no acute distress. Elderly, thin frail and chronically ill in appearance. Oriented to place. Awake and alert. + [place]  Skin:  Warm and dry. No new appearing rashes or lesions. Neck:  Supple. No JVD or carotid bruit appreciated. Chest: Lungs with diminished breath sounds posteriorly. No wheezes/rhonchi/rales  Cardiovascular:  Irreg; bradycardic; normal S1 and S2. No murmur/gallop or rub   Abdomen:  soft, nontender, nondistended, +bowel sounds. Extremities:  1+ bilateral pretibial edema. No clubbing or cyanosis. 2+ radial/DP/PT pulses.  Cap refill brisk    Medications:    vancomycin  1,000 mg Intravenous Once    enoxaparin  30 mg Subcutaneous Daily    cefepime  2,000 mg Intravenous Q24H    vancomycin (VANCOCIN) intermittent dosing (placeholder)   Other RX Placeholder    ipratropium-albuterol  1 ampule Inhalation BID    sertraline  50 mg Oral Daily    isosorbide mononitrate  60 mg Oral Daily    aspirin  81 mg Oral Daily    amLODIPine  10 mg Oral Daily    furosemide  40 mg Intravenous BID    sodium chloride flush  10 mL Intravenous 2 times per day    hydrALAZINE  100 mg Oral BID      sodium chloride       albuterol, sodium chloride flush, sodium chloride, ondansetron, polyethylene glycol, acetaminophen **OR** acetaminophen    Lab Data:  CBC:   Recent Labs     06/26/21  1631 06/27/21  0621 06/28/21  0545   WBC 6.1 3.5* 5.9   HGB 10.8* 10.2* 9.5*   * 117* 114*     BMP:    Recent Labs     06/26/21  1631 06/27/21  0621 06/28/21  0545    146* 147*   K 3.5 3.7 3.1*   CO2 27 28 28   BUN 32* 34* 37*   CREATININE 1.3 1.5* 1.7*     LIVR:   Recent Labs     06/26/21  1631   AST 18   ALT 11     Results for Jose Johnson (MRN [id number]) as of 6/28/2021 08:17   Ref.  Range 6/26/2021 16:31 6/27/2021 06:21   Pro-BNP Latest Ref Range: 0 - 449 pg/mL 31,412 (H)    Troponin Latest Ref Range: <0.01 ng/mL 0.05 (H) 0.04 (H)     ECG:  Atrial fib with slow ventricular rate; LBBB    6/26/2021 CTPA:    Slightly limited exam due to the motion artifact along the lung bases with no   obvious pulmonary embolus identified.  Recommend clinical follow-up.       Mildly dilated and atherosclerotic thoracic aorta with no aneurysm or   dissection       Moderate bibasilar pleural effusions and associated moderate atelectasis and   consolidations along the lung bases extending superiorly which is more   prominent on the left with probable small loculated pleural fluid collections   along the upper chest bilaterally.  Recommend follow-up       Borderline enlarged mediastinal lymph nodes which are probably reactive in   etiology.  Recommend follow-up.       Status post CABG surgery and mild cardiomegaly which is unchanged.           3/21/2017 Echo:   Left ventricle size is normal. Mild concentric left ventricular hypertrophy   is present. Ejection fraction is visually estimated to be 55%. No regional   wall motion abnormalities are noted. Patient appears to be in atrial   fibrillation. The right ventricle is moderately dilated. Right ventricular systolic   function appears mildly reduced. The left atrium is mildly dilated. The right atrium is moderately dilated. Mild aortic, mitral, pulmonic regurgitation. There is moderate tricuspid regurgitation. Estimated pulmonary artery systolic pressure is elevated at 64 mmHg assuming   a right atrial pressure of 8 mmHg. There is a large pleural effusion. 3/21/2017 CTA:  No acute pulmonary embolus detected.       Moderate bilateral pleural effusions and basilar consolidations.  Atelectasis   is also present.  Pulmonary edema is suspected.  Superimposed pneumonia   and/or aspiration may be present.       Marked cardiac enlargement.  Coronary artery calcifications are heavy. Assessment/Plan:    1. Sick sinus syndrome/Permnent atrial fibrillation with slow ventricular response  -denies dizziness/lightheadedness  -no AV maxx blockers given bradycardia  -not on anticoagulation d/t advanced age and dementia  -No plans for invasive procedures as per wishes of Mr. Claude Neve and his family    2. Acute on chronic right sided failure/Cor pulmonale. Pulmonary HTN  -remains on IV lasix   -remains on O2    3. CKD, stage IIIb  -Cr increasing; will decrease Lasix    4. Elevated troponin  -flat and not c/w ACS  -chronically elevated  -no plans for further evaluation  -has known CAD with prior CABG: continue medical management with ASA    5. Essential hypertension  -BP acceptable    6. COPD  -management per Primary team and pulmonary    7. Dementia  -continue comfort care measures    Electronically signed by RAYSHAWN Valencia CNP on 6/28/2021 at 8:13 AM

## 2021-06-28 NOTE — PROGRESS NOTES
06/27/21  0621 06/28/21  0545   BUN 32* 34* 37*   CREATININE 1.3 1.5* 1.7*         Intake/Output Summary (Last 24 hours) at 6/28/2021 2545  Last data filed at 6/27/2021 2136  Gross per 24 hour   Intake 540 ml   Output 1075 ml   Net -535 ml       Culture Results:  Pending    Ht Readings from Last 1 Encounters:   06/26/21 5' 11\" (1.803 m)        Wt Readings from Last 1 Encounters:   06/28/21 143 lb 8.3 oz (65.1 kg)         Estimated Creatinine Clearance: 24 mL/min (A) (based on SCr of 1.7 mg/dL (H)). Assessment/Plan:  Level this morning was 8.7 mg/L after receiving vancomycin 1000 mg. Vancomycin 1000 mg IV x 1 dose ordered. Regimen projects a trough level of 15-20 mg/L. Level ordered for 6/29/21 at 0600. Thank you for the consult.    Linda Dubon, [facility], PharmD, 6/28/2021 7:28 AM

## 2021-06-28 NOTE — PLAN OF CARE
monitor and assess. Problem: FLUID AND ELECTROLYTE IMBALANCE  Goal: Fluid and electrolyte balance are achieved/maintained  Outcome: Ongoing  Note: Patients fluid and electrolyte balance will be achieved and maintained. Will monitor and assess. Problem: ACTIVITY INTOLERANCE/IMPAIRED MOBILITY  Goal: Mobility/activity is maintained at optimum level for patient  Outcome: Ongoing  Note: Patients mobility and activity is maintained at optimum level for the patient. Will monitor and assess.

## 2021-06-29 VITALS
HEIGHT: 71 IN | WEIGHT: 139.99 LBS | RESPIRATION RATE: 16 BRPM | DIASTOLIC BLOOD PRESSURE: 68 MMHG | OXYGEN SATURATION: 96 % | HEART RATE: 37 BPM | BODY MASS INDEX: 19.6 KG/M2 | SYSTOLIC BLOOD PRESSURE: 159 MMHG | TEMPERATURE: 97.4 F

## 2021-06-29 LAB
ANION GAP SERPL CALCULATED.3IONS-SCNC: 9 MMOL/L (ref 3–16)
BASOPHILS ABSOLUTE: 0 K/UL (ref 0–0.2)
BASOPHILS RELATIVE PERCENT: 0.9 %
BUN BLDV-MCNC: 39 MG/DL (ref 7–20)
CALCIUM SERPL-MCNC: 8.3 MG/DL (ref 8.3–10.6)
CHLORIDE BLD-SCNC: 104 MMOL/L (ref 99–110)
CO2: 31 MMOL/L (ref 21–32)
CREAT SERPL-MCNC: 1.6 MG/DL (ref 0.8–1.3)
EOSINOPHILS ABSOLUTE: 0.2 K/UL (ref 0–0.6)
EOSINOPHILS RELATIVE PERCENT: 3.2 %
GFR AFRICAN AMERICAN: 49
GFR NON-AFRICAN AMERICAN: 40
GLUCOSE BLD-MCNC: 95 MG/DL (ref 70–99)
HCT VFR BLD CALC: 29.2 % (ref 40.5–52.5)
HEMOGLOBIN: 9.9 G/DL (ref 13.5–17.5)
LYMPHOCYTES ABSOLUTE: 1.6 K/UL (ref 1–5.1)
LYMPHOCYTES RELATIVE PERCENT: 28.9 %
MCH RBC QN AUTO: 31.8 PG (ref 26–34)
MCHC RBC AUTO-ENTMCNC: 33.7 G/DL (ref 31–36)
MCV RBC AUTO: 94.1 FL (ref 80–100)
MONOCYTES ABSOLUTE: 0.4 K/UL (ref 0–1.3)
MONOCYTES RELATIVE PERCENT: 7.9 %
NEUTROPHILS ABSOLUTE: 3.2 K/UL (ref 1.7–7.7)
NEUTROPHILS RELATIVE PERCENT: 59.1 %
PDW BLD-RTO: 14.5 % (ref 12.4–15.4)
PLATELET # BLD: 117 K/UL (ref 135–450)
PMV BLD AUTO: 7.9 FL (ref 5–10.5)
POTASSIUM REFLEX MAGNESIUM: 3.7 MMOL/L (ref 3.5–5.1)
PROCALCITONIN: 0.08 NG/ML (ref 0–0.15)
RBC # BLD: 3.11 M/UL (ref 4.2–5.9)
SODIUM BLD-SCNC: 144 MMOL/L (ref 136–145)
VANCOMYCIN RANDOM: 14.4 UG/ML
WBC # BLD: 5.4 K/UL (ref 4–11)

## 2021-06-29 PROCEDURE — 94761 N-INVAS EAR/PLS OXIMETRY MLT: CPT

## 2021-06-29 PROCEDURE — 2700000000 HC OXYGEN THERAPY PER DAY

## 2021-06-29 PROCEDURE — 84145 PROCALCITONIN (PCT): CPT

## 2021-06-29 PROCEDURE — 6360000002 HC RX W HCPCS: Performed by: INTERNAL MEDICINE

## 2021-06-29 PROCEDURE — 6370000000 HC RX 637 (ALT 250 FOR IP): Performed by: NURSE PRACTITIONER

## 2021-06-29 PROCEDURE — 85025 COMPLETE CBC W/AUTO DIFF WBC: CPT

## 2021-06-29 PROCEDURE — 36415 COLL VENOUS BLD VENIPUNCTURE: CPT

## 2021-06-29 PROCEDURE — 99232 SBSQ HOSP IP/OBS MODERATE 35: CPT | Performed by: INTERNAL MEDICINE

## 2021-06-29 PROCEDURE — 99232 SBSQ HOSP IP/OBS MODERATE 35: CPT | Performed by: NURSE PRACTITIONER

## 2021-06-29 PROCEDURE — 80202 ASSAY OF VANCOMYCIN: CPT

## 2021-06-29 PROCEDURE — 2580000003 HC RX 258: Performed by: INTERNAL MEDICINE

## 2021-06-29 PROCEDURE — 80048 BASIC METABOLIC PNL TOTAL CA: CPT

## 2021-06-29 PROCEDURE — 6370000000 HC RX 637 (ALT 250 FOR IP): Performed by: INTERNAL MEDICINE

## 2021-06-29 RX ORDER — FUROSEMIDE 10 MG/ML
40 INJECTION INTRAMUSCULAR; INTRAVENOUS DAILY
Status: DISCONTINUED | OUTPATIENT
Start: 2021-06-29 | End: 2021-06-29 | Stop reason: HOSPADM

## 2021-06-29 RX ADMIN — ENOXAPARIN SODIUM 30 MG: 30 INJECTION SUBCUTANEOUS at 08:25

## 2021-06-29 RX ADMIN — Medication 10 ML: at 08:25

## 2021-06-29 RX ADMIN — SERTRALINE 50 MG: 50 TABLET, FILM COATED ORAL at 08:25

## 2021-06-29 RX ADMIN — AMLODIPINE BESYLATE 10 MG: 10 TABLET ORAL at 08:25

## 2021-06-29 RX ADMIN — CEFEPIME HYDROCHLORIDE 2000 MG: 2 INJECTION, POWDER, FOR SOLUTION INTRAVENOUS at 12:08

## 2021-06-29 RX ADMIN — FUROSEMIDE 40 MG: 10 INJECTION, SOLUTION INTRAVENOUS at 12:08

## 2021-06-29 RX ADMIN — POTASSIUM BICARBONATE 20 MEQ: 782 TABLET, EFFERVESCENT ORAL at 08:25

## 2021-06-29 RX ADMIN — HYDRALAZINE HYDROCHLORIDE 100 MG: 50 TABLET, FILM COATED ORAL at 08:25

## 2021-06-29 RX ADMIN — ASPIRIN 81 MG: 81 TABLET, CHEWABLE ORAL at 08:25

## 2021-06-29 RX ADMIN — ISOSORBIDE MONONITRATE 60 MG: 60 TABLET, EXTENDED RELEASE ORAL at 08:25

## 2021-06-29 ASSESSMENT — PAIN SCALES - GENERAL: PAINLEVEL_OUTOF10: 0

## 2021-06-29 NOTE — DISCHARGE INSTR - COC
Continuity of Care Form    Patient Name: Hayden Lorenz   :  1926  MRN:  [id number]    Admit date:  2021  Discharge date:      Code Status Order: [facility]   Advance Directives:   [address] [facility] Documentation       Date/Time Healthcare Directive Type of Healthcare Directive Copy in [address] Agent's Name Healthcare Agent's Phone Number    [phone]  Yes, patient has an advance directive for healthcare treatment  Durable power of  for health care  No, copy requested from family  Adult Children  --  --            Admitting Physician:  Sukumar Kim MD  PCP: Marga Gandhi    Discharging Nurse: [facility] Unit/Room#: H2A-5411/3069-83  Discharging Unit Phone Number: [phone]    Emergency Contact:   Extended Emergency Contact Information  Primary Emergency Contact: [address] [facility] of [address] [facility] Phone: [phone]  Mobile Phone: [phone]  Relation: Child  Secondary Emergency Contact: Jennifer Ramirez [place] of [address] Phone: [phone]  Work Phone: [phone]  Relation: Child    Past Surgical History:  Past Surgical History:   Procedure Laterality Date    CARDIAC SURGERY      COLONOSCOPY      CORONARY ARTERY BYPASS GRAFT  2005    CYSTOSCOPY  2017    CYSTOSCOPY, EVACUATION OF CLOTS, FULGURATION, BLADDER BIOPSY    EYE SURGERY      LEFT RETINAL TEAR    INNER EAR SURGERY      FOR VERTIGO    LUNG BIOPSY  1971    wedge resection, RLL, benign     PROSTATE SURGERY      redo TURP for cancer    SKIN BIOPSY      TURP         Immunization History:   Immunization History   Administered Date(s) Administered    Influenza Virus Vaccine 2012    Influenza Whole 2009    Influenza, High Dose (Fluzone 65 yrs and older) 2014, 2015, 2016, 2017, 10/22/2019    Influenza, Quadv, 6 mo and older, IM, PF (Flulaval, Fluarix) 2018    Pneumococcal Conjugate 13-valent ([id number]) 11/27/2015    Pneumococcal Polysaccharide ([id number]) 11/21/2005    Tdap (Boostrix, Adacel) 05/15/2007       Active Problems:  Patient Active Problem List   Diagnosis Code    COPD (chronic obstructive pulmonary disease) ([facility] J44.9    Essential hypertension I10    Coronary artery disease involving native coronary artery of native heart without angina pectoris I25.10    Eczema L30.9    Central hearing loss H90.5    Anxiety F41.9    Recurrent major depressive disorder, in full remission (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    Cor pulmonale (chronic) (HCC) I27.81    Pulmonary hypertension (HCC) I27.20    Permanent atrial fibrillation (HCC) I48.21    S/P CABG (coronary artery bypass graft) Z95.1    Pleural effusion J90    Lung nodule R91.1    Bradycardia R00.1    Urinary retention R33.9    Hypoxia R09.02    Acute on chronic congestive heart failure (HCC) I50.9    Acute on chronic right-sided heart failure (HCC) I50.813    Stage 3 chronic kidney disease (HCC) N18.30    Elevated troponin R77.8    Sick sinus syndrome (HCC) I49.5    Pleuritic chest pain R07.81    Back pain M54.9    Acute on chronic diastolic (congestive) heart failure (HCC) I50.33    Chronic respiratory failure with hypoxia and hypercapnia (HCC) J96.11, J96.12    Dementia (HCC) F03.90    Hypokalemia E87.6       Isolation/Infection:   Isolation No Isolation          Patient Infection Status       Infection Onset Added Last Indicated Last Indicated By Review Planned Expiration Resolved Resolved By    None active    Resolved    COVID-19 Rule Out 21 COVID-19, Rapid (Ordered)   21 Rule-Out Test Resulted    COVID-19 Rule Out 20 COVID-19 Ambulatory (Ordered)   20 Rule-Out Test Resulted    COVID-19 20 COVID-19   20     COVID-19 Rule Out 20 COVID-19 (Ordered)   20 Rule-Out Test Resulted            Nurse Assessment:  Last Vital Signs: BP (!) 159/68 Comment: RN aware  Pulse (!) 37 Comment: RN aware  Temp 97.4 °F (36.3 °C) (Oral)   Resp 16   Ht 5' 11\" (1.803 m)   Wt 139 lb 15.9 oz (63.5 kg)   SpO2 96%   BMI 19.52 kg/m²     Last documented pain score (0-10 scale): Pain Level: 0  Last Weight:   Wt Readings from Last 1 Encounters:   21 139 lb 15.9 oz (63.5 kg)     Mental Status:  oriented and alert    IV Access:  - None    Nursing Mobility/ADLs:  Walking   Assisted  Transfer  Assisted  Bathing  Assisted  Dressing  Assisted  Toileting  Assisted  Feeding  Assisted  Med Admin  Assisted  Med Delivery   whole    Wound Care Documentation and Therapy:        Elimination:  Continence:   · Bowel: Yes  · Bladder: Yes  Urinary Catheter: None   Colostomy/Ileostomy/Ileal Conduit: No       Date of Last BM:     Intake/Output Summary (Last 24 hours) at 2021 1237  Last data filed at 2021 1013  Gross per 24 hour   Intake 380 ml   Output 400 ml   Net -20 ml     I/O last 3 completed shifts: In: 500 [P.O.:500]  Out: 400 [Urine:400]    Safety Concerns:      At Risk for Falls    Impairments/Disabilities:      None    Nutrition Therapy:  Current Nutrition Therapy:   - Oral Diet:  General    Routes of Feeding: Oral  Liquids: No Restrictions  Daily Fluid Restriction: yes - amount 1500  Last Modified Barium Swallow with Video (Video Swallowing Test): not done    Treatments at the Time of Hospital Discharge:   Respiratory Treatments:   Oxygen Therapy:  is on oxygen at 3 L/min per nasal cannula. Ventilator:    - No ventilator support    Rehab Therapies: Physical Therapy and Occupational Therapy  Weight Bearing Status/Restrictions: No weight bearing restirctions  Other Medical Equipment (for information only, NOT a DME order):  walker  Other Treatments:     Patient's personal belongings (please select all that are sent with patient):  Glasses    RN SIGNATURE:  Electronically signed by Erica Marquez RN on 6/29/21 at 2:00 PM EDT    CASE MANAGEMENT/SOCIAL WORK SECTION    Inpatient Status Date: 6/26/2021    Readmission Risk Assessment Score:  Readmission Risk              Risk of Unplanned Readmission:  17           Discharging to Facility/ Agency   · Name: [facility]  · Address:  · Phone:[phone]  · Fax:    Dialysis Facility (if applicable)   · Name:  · Address:  · Dialysis Schedule:  · Phone:  · Fax:    / signature: Electronically signed by Glenys Schuster on 6/29/21 at 3:37 PM EDT    PHYSICIAN SECTION    Prognosis: Good    Condition at Discharge: Stable    Rehab Potential (if transferring to Rehab): Fair    Recommended Labs or Other Treatments After Discharge: going to SNF with hospice    Physician Certification: I certify the above information and transfer of Hayden Lorenz  is necessary for the continuing treatment of the diagnosis listed and that he requires Estiven Zeke for less 30 days.      Update Admission H&P: No change in H&P    PHYSICIAN SIGNATURE:  Electronically signed by Angeli Tony MD on 6/29/21 at 12:38 PM EDT

## 2021-06-29 NOTE — PROGRESS NOTES
Report called to [facility]. All questions and concerns answered at this time.   Electronically signed by Son Mckeon RN on 6/29/2021 at 3:38 PM

## 2021-06-29 NOTE — PROGRESS NOTES
Pt / family decision to enroll in hospice noted. No specific HF metrics required. HF RN will no longer follow.

## 2021-06-29 NOTE — PROGRESS NOTES
Pt discharged to [facility]. Transportation here with stretcher. Transported by [facility]. Discharge instructions, Rx, and personal belongings given to transport drivers. Explanation of discharge medications and instructions understood by verbal statement. All paper work given to transport drivers to give to [facility]. Report called to [facility]. No questions, comments or concerns at this time.    Electronically signed by Janessa Garcia RN on 6/29/2021 at 4:42 PM

## 2021-06-29 NOTE — CARE COORDINATION
Patient will dc to [facility] long term care with hospice. Received call from [facility] at the facility. Patient will need a [place]--completed.     Electronically signed by BOOGIE Gutierrez, ESTEBAN, Case Management on 6/29/2021 at 3:46 PM  [place] [phone]

## 2021-06-29 NOTE — PROGRESS NOTES
PALLIATIVE MEDICINE PROGRESS NOTE     Patient name:Collins Curtis    URR:[id number] :1926  Room/Bed:S9L-4353/3131-01    LOS: 3 days        ASSESSMENT/RECOMMENDATIONS     80 y.o. male with AMS, debility and dyspnea         Symptom Management:  1. AMS- pt has dementia at baseline no acute change reported by LTC facility  2. Debility- Pt has increased weakness per LTC facility with falls at facility  3. Dyspnea- pt is on 3L at baseline admitted with acute exacerbation of COPD  4. Goals of Care- pt is confused and unable to participate meaningfully in conversation he is [facility] at baseline he asked that I call his daughter Keith Holm I left her a voice mail message to discuss [address].      Patient/Family Goals of Care :    pt is confused and unable to participate meaningfully in conversation he is [facility] at baseline he asked that I call his daughter Keith Holm I left her a voice mail message to discuss [address].      --Addendum:  Pts daughter returned my call she is agreeable to hospice level of care as recommended by Dr Dulce Altamirano. She would prefer whoever [facility] recommends. Notified [place] CM to please follow-up on Hospice choice.   Plan back to [facility] with Hospice support today        Disposition/Discharge Plan:   pending     Advance Directives:  · Surrogate Decision Maker: Nayeli-mumtaz  · Code status:  DNR-CC     Case discussed with: patient, floor RN, Dr Dulce Altamirano, left message for Nayeli-daughter  Thank you for allowing us to participate in the care of this patient. SUBJECTIVE     Chief Complaint: Dyspnea    Last 24 hours:   Pt reports that breathing continues to be an issue this am. He feels like its improved overall. ROS:  Review of Systems -   History obtained from chart review and unobtainable from patient due to mental status     Patient unable to complete full ROS due to current cognitive status. Information that is obtained from nursing and chart.       OBJECTIVE   BP (!) 159/68 Comment: RN aware  Pulse (!) 37 Comment: RN aware  Temp 97.4 °F (36.3 °C) (Oral)   Resp 16   Ht 5' 11\" (1.803 m)   Wt 139 lb 15.9 oz (63.5 kg)   SpO2 96%   BMI 19.52 kg/m²   I/O last 3 completed shifts: In: 500 [P.O.:500]  Out: 400 [Urine:400]  No intake/output data recorded.       Physical Examination:  General appearance - chronically ill appearing  Mental status - oriented to person only  Neck - supple, no significant adenopathy  Chest - diminised auscultation, no wheezes, rales or rhonchi, symmetric air entry  Abdomen - soft, nontender, nondistended, no masses or organomegaly  Musculoskeletal - no joint tenderness, deformity or swelling  Skin - normal coloration and turgor, no rashes, no suspicious skin lesions noted       Signed By: Electronically signed by RAYSHAWN Cherry CNP on 6/29/2021 at 10:21 AM   Palliative Medicine   [phone]    June 29, 2021

## 2021-06-29 NOTE — RT PROTOCOL NOTE
RT Nebulizer Bronchodilator Protocol Note    There is a bronchodilator order in the chart from a provider indicating to follow the RT Bronchodilator Protocol and there is an Initiate RT Bronchodilator Protocol order as well (see protocol at bottom of note). The findings from the last RT Protocol Assessment were as follows:  Smoking: <15 Pack years  Surgical Status: No surgery  Xray: Multiple lobe infiltrates/atelectasis/pleural effusion/edema  Respiratory Pattern: RR 12-20  Mental Status: Alert and Oriented (pt [place])  Breath Sounds: Diminished and/or crackles  Cough: Strong, spontaneous, non-productive  Activity Level: Mostly sedentary, minimal walking  Oxygen Requirement: Room Air - 2LNC/28% or home setting  Indication for Bronchodilator Therapy: On home bronchodilators  Bronchodilator Assessment Score: 5    Aerosolized bronchodilator medication orders have been revised according to the RT Bronchodilator Protocol. RT Bronchodilator Protocol:    Respiratory Therapist to perform RT Therapy Protocol Assessment then follow the protocol. No Indications - adjust the frequency to every 6 hours PRN wheezing or bronchospasm, if no treatments needed after 48 hours then discontinue using Per Protocol order mode. If indication present, adjust the RT bronchodilator orders based on the Bronchodilator Assessment Score as follows:    0-6 - enter or revise RT Bronchodilator order to Albuterol Nebulizer order with frequency of every 2 hours PRN for wheezing or increased work of breathing using Per Protocol order mode. Repeat RT Therapy Protocol Assessment as needed. 7-10 - discontinue any other Inpatient aerosolized bronchodilator medication orders and enter or revise two Albuterol Nebulizer orders, one with BID frequency and one with frequency of every 2 hours PRN wheezing or increased work of breathing using Per Protocol order mode.   Repeat RT Therapy Protocol Assessment with second treatment then BID and as needed. 11-13 - discontinue any other Inpatient aerosolized bronchodilator medication orders and enter DuoNeb Nebulizer order with QID frequency and an Albuterol Nebulizer order with frequency of every 2 hours PRN wheezing or increased work of breathing using Per Protocol order mode. Repeat RT Therapy Protocol Assessment with second treatment then QID and as needed. Greater than 13 - discontinue any other Inpatient aerosolized bronchodilator medication orders and enter a DuoNeb Nebulizer order with every 4 hours frequency and an Albuterol Nebulizer order with frequency of every 2 hours PRN wheezing or increased work of breathing using Per Protocol order mode. Repeat RT Therapy Protocol Assessment with second treatment then every 4 hours and as needed. RT to enter RT Home Evaluation for COPD & MDI Assessment order using Per Protocol order mode.     Electronically signed by Jodee Fernandez RCP on 6/28/2021 at 9:02 PM

## 2021-06-29 NOTE — PLAN OF CARE
Problem: Falls - Risk of:  Goal: Will remain free from falls  Description: Will remain free from falls  6/29/2021 1124 by Austin Hwang RN  Outcome: Ongoing  Note: Fall risk assessment completed. Fall precautions in place. Call light within reach. Pt educated on calling for assistance before getting up. Walkway free of clutter. Will continue to monitor. 6/28/2021 2314 by Sophia Loyd RN  Outcome: Ongoing  Goal: Absence of physical injury  Description: Absence of physical injury  6/29/2021 1124 by Austin Hwang RN  Outcome: Ongoing  Note: Patient will remain free from physical injury. Safety precautions in place. Will monitor and assess. 6/28/2021 2314 by Sophia Loyd RN  Outcome: Ongoing     Problem: Safety:  Goal: Free from accidental physical injury  Description: Free from accidental physical injury  6/29/2021 1124 by Austin Hwang RN  Outcome: Ongoing  Note: Patient will remain free from accidental physical harm. Will monitor and assess. 6/28/2021 2314 by Sophia Loyd RN  Outcome: Ongoing  Goal: Free from intentional harm  Description: Free from intentional harm  6/29/2021 1124 by Austin Hwang RN  Outcome: Ongoing  Note: Patient will remain free from intentional harm. Will monitor and assess. 6/28/2021 2314 by Sophia Loyd RN  Outcome: Ongoing     Problem: Daily Care:  Goal: Daily care needs are met  Description: Daily care needs are met  6/29/2021 1124 by Austin Hwang RN  Outcome: Ongoing  Note: Daily care needs have been met by staff and patient. Will continue to monitor needs. 6/28/2021 2314 by Sophia Loyd RN  Outcome: Ongoing     Problem: Skin Integrity:  Goal: Skin integrity will stabilize  Description: Skin integrity will stabilize  6/29/2021 1124 by Austin Hwang RN  Outcome: Ongoing  Note: Patients skin integrity will stabilize. Will monitor and assess.   6/28/2021 2314 by Sophia Loyd RN  Outcome: Ongoing Problem: OXYGENATION/RESPIRATORY FUNCTION  Goal: Patient will maintain patent airway  6/29/2021 1124 by Laron Song RN  Outcome: Ongoing  Note: Patient will maintain patent airway. Will monitor and assess. 6/28/2021 2314 by Faith Colvin RN  Outcome: Ongoing  Goal: Patient will achieve/maintain normal respiratory rate/effort  Description: Respiratory rate and effort will be within normal limits for the patient  6/29/2021 1124 by Laron Song RN  Outcome: Ongoing  Note: Patient will achieve and maintain normal respiratory rate and effort. Will monitor and assess. 6/28/2021 2314 by Faith Colvin RN  Outcome: Ongoing     Problem: HEMODYNAMIC STATUS  Goal: Patient has stable vital signs and fluid balance  6/29/2021 1124 by Laron Song RN  Outcome: Ongoing  Note: Patient will achieve and maintain stable vital signs and fluid balance. Will monitor and assess. 6/28/2021 2314 by Faith Colvin RN  Outcome: Ongoing     Problem: FLUID AND ELECTROLYTE IMBALANCE  Goal: Fluid and electrolyte balance are achieved/maintained  6/29/2021 1124 by Laron Song RN  Outcome: Ongoing  Note: Patients fluid and electrolyte balance are achieved and maintained. Will monitor and assess. 6/28/2021 2314 by Faith Colvin RN  Outcome: Ongoing     Problem: ACTIVITY INTOLERANCE/IMPAIRED MOBILITY  Goal: Mobility/activity is maintained at optimum level for patient  6/29/2021 1124 by Laron Song RN  Outcome: Ongoing  Note: Patients mobility and activity is maintained at optimum level for patient. Will monitor and assess.   6/28/2021 2314 by Faith Colvin RN  Outcome: Ongoing

## 2021-06-29 NOTE — PROGRESS NOTES
Pt AAO x3, had the date wrong. No c/o pain.  [place]. Assessment completed and charted. No c/o SOB. Occasional non productive cough. Crackles to Left lower lobe. O2 sat 94% on 1.5L/NC. 2+ edema noted to AGNIESZKA LE. Elevated on a pillow. Took PM pills without any difficulty. Denies any needs. Call light in reach. Will monitor.

## 2021-06-29 NOTE — DISCHARGE SUMMARY
Hospital Medicine Discharge Summary      Patient ID: Kati Mitchell      Patient's PCP: Ruben Higgins    Admit Date: 6/26/2021     Discharge Date: 6/29/2021  The patient was seen and examined on day of discharge and this discharge summary is in conjunction with any daily progress note from day of discharge. Admitting Physician: Mary Real MD    Discharge Physician: Danielle France MD     Admitted for   Chief Complaint   Patient presents with    Shortness of Breath     x 1 week and getting worse    Abdominal Pain     upper right abd pain x 1 week and geeting worse       Admitting Diagnosis Acute on chronic diastolic (congestive) heart failure ([facility] [I50.33]    Discharge Diagnoses:      Active Hospital Problems    Diagnosis Date Noted    Acute on chronic right-sided heart failure (HCC) [I50.813]      Priority: High    Permanent atrial fibrillation (HCC) [I48.21]      Priority: High    Cor pulmonale (chronic) (HCC) [I27.81]      Priority: High    COPD (chronic obstructive pulmonary disease) ([facility] [J44.9] 05/18/2011     Priority: High    Essential hypertension [I10] 05/18/2011     Priority: High    Coronary artery disease involving native coronary artery of native heart without angina pectoris [I25.10] 05/18/2011     Priority: High    Hypokalemia [E87.6]     Chronic respiratory failure with hypoxia and hypercapnia (HCC) [J96.11, J96.12] 06/27/2021    Dementia ([facility] [F03.90] 06/27/2021    Acute on chronic diastolic (congestive) heart failure (HCC) [I50.33] 06/26/2021    Pleuritic chest pain [R07.81] 07/25/2019    Elevated troponin [R77.8] 11/21/2018    Stage 3 chronic kidney disease ([facility] [N18.30] 08/21/2018    Acute on chronic congestive heart failure ([facility] [I50.9]     Hypoxia [R09.02] 12/02/2017    Pleural effusion [J90]     Lung nodule [R91.1]     Hyperglycemia [R73.9] 12/02/2016       Follow Up: Primary Care Physician in one week    PCP to Follow up on   Patient discharged with hospice          Hospital Course:   Patient is a 77-year-old male with a past medical history of 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 fib, CKD, COPD, chronic respiratory failure needing 3 L of oxygen was sent from his nursing facility for 1 week of shortness of breath and right upper quadrant pain. Patient was admitted with CHF exacerbation. Further work-up revealed a new systolic CHF with a EF of 35%. Patient was diuresed with IV Lasix with improvement. Patient does have bilateral pleural effusion and had compressive atelectasis and there was some concern for pneumonia hence was treated with broad-spectrum antibiotics. Procalcitonin x2 is negative and patient is improving with diuresis. Antibiotics have been stopped at the time of discharge. Given his age and memory issues palliative care was consulted. Patient is going back to his nursing facility with hospice.     CHF exacerbation, -new systolic CHF  -Treat as acute on chronic systolic CHF exacerbation with right-sided heart failure  Last echo showed a EF of-55%. ,  Repeat echo showed her EF of 35%  -DESTIN and daily weight  -No beta-blocker due to bradycardia  -No ACE ARB or Aldactone due to chronic kidney disease     Pneumonia  -Patient is from a nursing home. CT shows effusion with consolidation  -Procalcitonin ok   -Start broad-spectrum antibiotics  - treat as gram neg  -Repeat procalcitonin.   If negative will stop antibiotics  -Stop vancomycin as MRSA swab was negative     Pleural effusion- ct diuresis  CT shows possible loculation-  -we will consult pulmonology        COPD with chronic respiratory failure  -Patient has chronic hypoxia and uses 3 L of oxygen at baseline     Coronary artery disease  -Continue aspirin     A. fib with slow ventricular response  Patient is not on anticoagulation due to hematuria-     Chronic kidney disease stage III  -Baseline creatinine 1.3-1.5  -hold lisinopril with diuresis     Hypertension  -Monitor blood pressure and continue home meds     Dementia  -Supportive care     Patient is a DNR CC per records will consult palliative care      Consults:     IP CONSULT TO HOSPITALIST  IP CONSULT TO CARDIOLOGY  IP CONSULT TO PALLIATIVE CARE  IP CONSULT TO PULMONOLOGY  IP CONSULT TO HOSPICE        Disposition: SNF with hospice    Discharged Condition: Stable    Code Status: DNR-CC    Activity: activity as tolerated    Diet: cardiac diet          Labs: For convenience and continuity at follow-up the following most recent labs are provided:    CBC:   Lab Results   Component Value Date    WBC 5.4 06/29/2021    HGB 9.9 06/29/2021    HCT 29.2 06/29/2021     06/29/2021       RENAL:   Lab Results   Component Value Date     06/29/2021    K 3.7 06/29/2021     06/29/2021    CO2 31 06/29/2021    BUN 39 06/29/2021    CREATININE 1.6 06/29/2021           Discharge Medications:    Annie Alarcon   Home Medication Instructions [id number]    Printed on:06/29/21 1240   Medication Information                      acetaminophen (TYLENOL) 500 MG tablet  Take 500 mg by mouth every 6 hours as needed for Pain             amLODIPine (NORVASC) 10 MG tablet  Take 1 tablet by mouth daily             aspirin (ASPIRIN CHILDRENS) 81 MG chewable tablet  Take 1 tablet by mouth daily             docusate sodium (COLACE) 100 MG capsule  Take 100 mg by mouth daily             furosemide (LASIX) 40 MG tablet  Take 1 tablet by mouth daily Take 40 mg daily. Increase to 60 mg if weight over 164 lbs.             gabapentin (NEURONTIN) 100 MG capsule  Take 100 mg by mouth 2 times daily.              hydrALAZINE (APRESOLINE) 100 MG tablet  Take 1 tablet by mouth 3 times daily             ipratropium-albuterol (DUONEB) 0.5-2.5 (3) MG/3ML SOLN nebulizer solution  Inhale 1 vial into the lungs every 6 hours as needed for Shortness of Breath             isosorbide mononitrate (IMDUR) 60 MG extended release tablet  Take 1 tablet by mouth daily Dr. Osei Score - Cardio             Menthol, Topical Analgesic, 4 % GEL  Apply topically 4 times daily as needed (Pain) Apply to lower back topically four times daily as needed for pain             polyethylene glycol (MIRALAX) 17 g packet  Take 17 g by mouth three times a week Mon/Wed/Sat             sertraline (ZOLOFT) 50 MG tablet  Take 1 tablet by mouth daily                 Future Appointments   Date Time Provider Raymundo Montoya   8/24/2021 10:30 AM Bean Oliveira MD [facility]       Time Spent on discharge is more than 30 minutes in the examination, evaluation, counseling and review of medications and discharge plan. Signed:  Freddy Lemus MD   6/29/2021    The note was completed using EMR. Every effort was made to ensure accuracy; however, inadvertent computerized transcription errors may be present. Thank you Oleg Rodrigues for the opportunity to be involved in this patient's care. If you have any questions or concerns please feel free to contact me at [phone].

## 2021-06-29 NOTE — CARE COORDINATION
Returning to [facility] with [facility] by [employer] [id number]. Nursing report to [phone]. Bernarda Valdes RN, [address], [facility], [address] [place] [address]   O: [phone]  C: [phone]  F: [phone]   Main & Referrals: [phone]   [facility]. [facility]

## 2021-06-29 NOTE — PROGRESS NOTES
Pulmonary Progress Note    Date of Admission: 6/26/2021   LOS: 3 days       CC:  Acute hypoxemia    Subjective:  No complains of shortness of breath         ROS:   No nausea  No Vomiting  No chest pain       Assessment:        Acute hypoxemic resp failure  Pleural effusion  Acute on chronic systolic/ diastolic heart failure  Lung nodules   Pulmonary hypertension    Plan:        Hospital Day: 3       - echo with grade 3 diastolic dysfunction. Diuresis limited by CKD  - pulmonary edema causing hypoxemia  - considering hospice. - Wean O2 to sat >90%        Data:        PHYSICAL EXAM:   Blood pressure (!) 159/68, pulse (!) 37, temperature 97.4 °F (36.3 °C), temperature source Oral, resp. rate 16, height 5' 11\" (1.803 m), weight 139 lb 15.9 oz (63.5 kg), SpO2 96 %.'  Body mass index is 19.52 kg/m². Gen: No distress. ENT:   Resp: No accessory muscle use. No crackles. No wheezes. No rhonchi. CV: Regular rate. Regular rhythm. No murmur or rub. No edema. Skin: Warm, dry, normal texture and turgor. No nodule on exposed extremities. M/S: No cyanosis. No clubbing. No joint deformity. Psych: Oriented x 3. No anxiety. Awake. Alert. Intact judgement and insight. Good Mood / Affect.   Memory appears in tact       Medications:    Scheduled Meds:   furosemide  40 mg Intravenous Daily    enoxaparin  30 mg Subcutaneous Daily    potassium bicarb-citric acid  20 mEq Oral Daily    cefepime  2,000 mg Intravenous Q24H    ipratropium-albuterol  1 ampule Inhalation BID    sertraline  50 mg Oral Daily    isosorbide mononitrate  60 mg Oral Daily    aspirin  81 mg Oral Daily    amLODIPine  10 mg Oral Daily    sodium chloride flush  10 mL Intravenous 2 times per day    hydrALAZINE  100 mg Oral BID       Continuous Infusions:   sodium chloride         PRN Meds:  perflutren lipid microspheres, albuterol, sodium chloride flush, sodium chloride, ondansetron, polyethylene glycol, acetaminophen **OR** acetaminophen    Labs reviewed:  CBC:   Recent Labs     06/27/21  0621 06/28/21  0545 06/29/21  0548   WBC 3.5* 5.9 5.4   HGB 10.2* 9.5* 9.9*   HCT 30.0* 28.2* 29.2*   MCV 93.9 93.7 94.1   * 114* 117*     BMP:   Recent Labs     06/27/21  0621 06/28/21  0545 06/29/21  0548   * 147* 144   K 3.7 3.1* 3.7    106 104   CO2 28 28 31   BUN 34* 37* 39*   CREATININE 1.5* 1.7* 1.6*     LIVER PROFILE:   Recent Labs     06/26/21  1631   AST 18   ALT 11   LIPASE 34.0   BILITOT 0.8   ALKPHOS 118     PT/INR: No results for input(s): PROTIME, INR in the last 72 hours. APTT: No results for input(s): APTT in the last 72 hours. UA:No results for input(s): NITRITE, COLORU, PHUR, LABCAST, WBCUA, RBCUA, MUCUS, TRICHOMONAS, YEAST, BACTERIA, CLARITYU, SPECGRAV, LEUKOCYTESUR, UROBILINOGEN, BILIRUBINUR, BLOODU, GLUCOSEU, AMORPHOUS in the last 72 hours. Invalid input(s): Kisha Donaldo  No results for input(s): PH, PCO2, PO2 in the last 72 hours. Cx:      Films: This note was transcribed using [id number] [email or web address]. Please disregard any translational errors.       Woody Treviño Pulmonary, Sleep and [facility] [phone]

## 2021-06-29 NOTE — PROGRESS NOTES
Progress Note  Admit Date: 6/26/2021      PCP: Corinna Villarreal     CC: F/U for loss of breath and abdominal pain    Days in hospital:  3    SUBJECTIVE / Interval History:  Pt feels ok, states SOB improved   R side CP better    Now on 2 L , baseline     Neg 1. 6 L     Patient does have memory issues and review of systems limited due to it            Allergies  Escitalopram, Flexeril [cyclobenzaprine hcl], Hydrocod polst-cpm polst er, Hydrocodone, Lipitor, Sudafed [pseudoephedrine hcl], and Tussionex pennkinetic er [employer] polst-cpm polst er]    Medications    Scheduled Meds:   enoxaparin  30 mg Subcutaneous Daily    potassium bicarb-citric acid  20 mEq Oral Daily    cefepime  2,000 mg Intravenous Q24H    vancomycin (VANCOCIN) intermittent dosing (placeholder)   Other RX Placeholder    ipratropium-albuterol  1 ampule Inhalation BID    sertraline  50 mg Oral Daily    isosorbide mononitrate  60 mg Oral Daily    aspirin  81 mg Oral Daily    amLODIPine  10 mg Oral Daily    sodium chloride flush  10 mL Intravenous 2 times per day    hydrALAZINE  100 mg Oral BID     Continuous Infusions:   sodium chloride         PRN Meds:  perflutren lipid microspheres, albuterol, sodium chloride flush, sodium chloride, ondansetron, polyethylene glycol, acetaminophen **OR** acetaminophen    Vitals    BP (!) 181/87 Comment: RN aware  Pulse (!) 45 Comment: RN aware  Temp 97.4 °F (36.3 °C) (Oral)   Resp 16   Ht 5' 11\" (1.803 m)   Wt 139 lb 15.9 oz (63.5 kg)   SpO2 96%   BMI 19.52 kg/m²     Exam:    Gen: No distress. Eyes: PERRL. No sclera icterus. No conjunctival injection. ENT: No discharge. Pharynx clear. External appearance of ears and nose normal.  Neck: Trachea midline. No obvious mass. Resp: No accessory muscle use. No crackles. No wheezes. No rhonchi. No dullness on percussion. CV: Regular rate. Regular rhythm. No murmur or rub. trace edema. GI: Non-tender. Non-distended. No hernia.    Skin: Warm, dry, normal texture and turgor. No nodule on exposed extremities. Lymph: No cervical LAD. No supraclavicular LAD. M/S: No cyanosis. No clubbing. No joint deformity. Neuro: Moves all four extremities. CN 2-12 tested, no defect noted. Psych: Oriented x 2. No anxiety. Data    LABS  CBC:   Recent Labs     06/27/21  0621 06/28/21  0545 06/29/21  0548   WBC 3.5* 5.9 5.4   HGB 10.2* 9.5* 9.9*   HCT 30.0* 28.2* 29.2*   MCV 93.9 93.7 94.1   * 114* 117*     BMP:   Recent Labs     06/27/21  0621 06/28/21  0545 06/29/21  0548   * 147* 144   K 3.7 3.1* 3.7    106 104   CO2 28 28 31   BUN 34* 37* 39*   CREATININE 1.5* 1.7* 1.6*   GLUCOSE 124* 106* 95     POC GLUCOSE:  No results for input(s): POCGLU in the last 72 hours. LIVER PROFILE:   Recent Labs     06/26/21  1631   AST 18   ALT 11   LIPASE 34.0   LABALBU 4.0   BILITOT 0.8   ALKPHOS 118     PT/INR: No results for input(s): PROTIME, INR in the last 72 hours. APTT: No results for input(s): APTT in the last 72 hours. UA:No results for input(s): NITRITE, COLORU, PHUR, LABCAST, WBCUA, RBCUA, MUCUS, TRICHOMONAS, YEAST, BACTERIA, CLARITYU, SPECGRAV, LEUKOCYTESUR, UROBILINOGEN, BILIRUBINUR, BLOODU, GLUCOSEU, KETUA, AMORPHOUS in the last 72 hours. Microbiology:  Wound Culture: No results for input(s): WNDABS, ORG in the last 72 hours. Invalid input(s):  LABGRAM  Nasal Culture:   Recent Labs     06/27/21  1057   MRSAPCR Negative  MRSA DNA not detected. Normal Range: Not detected       Blood Culture: No results for input(s): BC, BLOODCULT2 in the last 72 hours. Fungal Culture:   No results for input(s): FUNGSM in the last 72 hours. No results for input(s): FUNCXBLD in the last 72 hours. CSF Culture:  No results for input(s): COLORCSF, APPEARCSF, CFTUBE, CLOTCSF, WBCCSF, RBCCSF, NEUTCSF, NUMCELLSCSF, LYMPHSCSF, MONOCSF, GLUCCSF, VOLCSF in the last 72 hours.   Respiratory Culture:  No results for input(s): [place] [employer] in the last 72 hours.  AFB:No results for input(s): AFBSMEAR in the last 72 hours. Urine Culture  No results for input(s): LABURIN in the last 72 hours. RADIOLOGY:    XR CHEST PORTABLE   Final Result   Pulmonary edema with increased pleuroparenchymal disease bilaterally. CT CHEST PULMONARY EMBOLISM W CONTRAST   Final Result   Slightly limited exam due to the motion artifact along the lung bases with no   obvious pulmonary embolus identified. Recommend clinical follow-up. Mildly dilated and atherosclerotic thoracic aorta with no aneurysm or   dissection      Moderate bibasilar pleural effusions and associated moderate atelectasis and   consolidations along the lung bases extending superiorly which is more   prominent on the left with probable small loculated pleural fluid collections   along the upper chest bilaterally. Recommend follow-up      Borderline enlarged mediastinal lymph nodes which are probably reactive in   etiology. Recommend follow-up. Status post CABG surgery and mild cardiomegaly which is unchanged. Mild compression fractures of T12 and L1, the age of which are indeterminate. XR CHEST PORTABLE   Final Result   1. Interstitial pulmonary edema with possible perihilar alveolar edema   suggestive of congestive heart failure given mild cardiomegaly and bilateral   pleural effusions. Pneumonia could appear similar. 2. Small to moderate bilateral pleural effusions with underlying bibasilar   passive atelectasis. Superimposed pneumonia and aspiration are not excluded.              CONSULTS:    IP CONSULT TO HOSPITALIST  IP CONSULT TO CARDIOLOGY  IP CONSULT TO PALLIATIVE CARE  PHARMACY TO DOSE VANCOMYCIN  IP CONSULT TO PULMONOLOGY  IP CONSULT TO HOSPICE    ASSESSMENT AND PLAN:      Principal Problem:    Acute on chronic diastolic (congestive) heart failure (HCC)  Active Problems:    COPD (chronic obstructive pulmonary disease) ([facility] [address]    Essential hypertension    Coronary artery disease involving native coronary artery of native heart without angina pectoris    Cor pulmonale (chronic) (HCC)    Permanent atrial fibrillation (HCC)    Acute on chronic right-sided heart failure (HCC)    Hyperglycemia    Pleural effusion    Lung nodule    Hypoxia    Acute on chronic congestive heart failure (HCC)    Stage 3 chronic kidney disease (HCC)    Elevated troponin    Pleuritic chest pain    Chronic respiratory failure with hypoxia and hypercapnia (HCC)    Dementia (HCC)    Hypokalemia  Resolved Problems:    * No resolved hospital problems. 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 fib, CKD, COPD, chronic respiratory failure needing 3 L of oxygen was sent from his nursing facility for 1 week of shortness of breath and right upper quadrant pain. Patient was admitted with CHF exacerbation. Further work-up revealed a new systolic CHF with a EF of 35%. Patient was diuresed with IV Lasix with improvement. Patient does have bilateral pleural effusion and had compressive atelectasis and there was some concern for pneumonia hence was treated with broad-spectrum antibiotics. Procalcitonin x2 is negative and patient is improving with diuresis. Antibiotics have been stopped at the time of discharge. Given his age and memory issues palliative care was consulted. Patient is going back to his nursing facility with hospice. CHF exacerbation, -new systolic CHF  -Treat as acute on chronic systolic CHF exacerbation with right-sided heart failure  Last echo showed a EF of-55%. ,  Repeat echo showed her EF of 35%  -DESTIN and daily weight  -No beta-blocker due to bradycardia  -No ACE ARB or Aldactone due to chronic kidney disease    Pneumonia  -Patient is from a nursing home.   CT shows effusion with consolidation  -Procalcitonin ok   -Start broad-spectrum antibiotics  - treat as gram neg  -Repeat procalcitonin. If negative will stop antibiotics  -Stop vancomycin as MRSA swab was negative    Pleural effusion- ct diuresis  CT shows possible loculation-  -we will consult pulmonology      COPD with chronic respiratory failure  -Patient has chronic hypoxia and uses 3 L of oxygen at baseline    Coronary artery disease  -Continue aspirin    A. fib with slow ventricular response  Patient is not on anticoagulation due to hematuria-    Chronic kidney disease stage III  -Baseline creatinine 1.3-1.5  -hold lisinopril with diuresis    Hypertension  -Monitor blood pressure and continue home meds    Dementia  -Supportive care    Patient is a DNR CC per records will consult palliative care        DVT Prophylaxis: lovenox  Diet: ADULT DIET; Regular; Low Sodium (2 gm); 1500 ml  Code Status: DNR-CC    PT/OT Eval Status:ordered    Discharge plan -today    The patient and / or the family were informed of the results of any tests, a time was given to answer questions, a plan was proposed and they agreed with plan. Discussed with consulting physicians, nursing and social work     The note was completed using EMR. Every effort was made to ensure accuracy; however, inadvertent computerized transcription errors may be present.        Humberto Horner MD

## 2021-06-29 NOTE — PROGRESS NOTES
Physician Progress Note      PATIENT:               Jaron Breen  CSN #:                  [id number]  :                       1926  ADMIT DATE:       2021 3:30 PM  [address] [place] DATE:  RESPONDING  PROVIDER #:        Kadie Duggan MD          QUERY TEXT:    Dear Dr Florida Nunez,    Patient admitted with CHF exacerbation . Noted documentation of acute   respiratory failure in consult note on . In order to support the diagnosis   of acute respiratory failure, please include additional clinical indicators   in your documentation. Or please document if the diagnosis of acute   respiratory failure has been ruled out after further study. The medical record reflects the following:  Risk Factors: copd, chf  Clinical Indicators: Documentation per h&p of  chronic respiratory failure   requiring 3 L of oxygen at baseline. On admission 96%ra  98%2l . RR-24-16. No   increase in baseline O2. Treatment: [email or web address], monitor resp status, monitor o2 sats, Pulmonology   consult,. iv lasix    Thank you, Ashley Arauz RN CDS HOUSTON [email or web address]    Acute Respiratory Failure Clinical Indicators per [employer] Training Guide and   Quick Reference Guide:  pO2 < 60 mmHg or SpO2 (pulse oximetry) < 91% breathing room air  pCO2 > 50 and pH < 7.35  P/F ratio (pO2 / FIO2) < 300  pO2 decrease or pCO2 increase by 10 mmHg from baseline (if known)  Supplemental oxygen of 40% or more  Presence of respiratory distress, tachypnea, dyspnea, shortness of breath,   wheezing  Unable to speak in complete sentences  Use of accessory muscles to breathe  Extreme anxiety and feeling of impending doom  Tripod position  Confusion/altered mental status/obtunded  Options provided:  -- Acute Respiratory Failure as evidenced by, Please document evidence.   -- Acute Respiratory Failure ruled out after study  -- Other - I will add my own diagnosis  -- Disagree - Not applicable / Not valid  -- Disagree - Clinically unable to determine / Unknown  -- Refer to Clinical Documentation Reviewer    PROVIDER RESPONSE TEXT:    This patient is in acute respiratory failure as evidenced by saturation < 90%   on room air    Query created by:  Rosy Arauz on 6/28/2021 11:55 AM      Electronically signed by:  Zaira Zhu MD 6/29/2021 3:13 PM

## 2021-06-29 NOTE — PROGRESS NOTES
[facility]   Daily Progress Note      Admit Date:  6/26/2021    Reason for follow up visit: CHF; Bradycardia    CC: \"I'm okay. \"    79 y/o male with PMH significant for chronic atrial fib, CAD, CHF, CKD, dementia, pulmonary HTN, HTN and HLP who was admitted to [facility] after presenting from the nursing home with abdominal pain and SOB. He c/o R side chest pain with deep breaths on admission but denies today. Plan is for comfort based care as previously outlined by the family. Plan is for hospice today    Interval History:  Pt. seen and examined; records reviewed  BP noted. Remains on O2  Denies chest pain or SOB  Cr 1.6      Subjective:  Pt with no acute overnight cardiac events. Denies chest pain, SOB, cough, palpitations or dizziness  Pt poor historian  + [place]      Objective:   BP (!) 181/87 Comment: RN aware  Pulse (!) 45 Comment: RN aware  Temp 97.4 °F (36.3 °C) (Oral)   Resp 16   Ht 5' 11\" (1.803 m)   Wt 139 lb 15.9 oz (63.5 kg)   SpO2 96%   BMI 19.52 kg/m²       Intake/Output Summary (Last 24 hours) at 6/29/2021 0951  Last data filed at 6/29/2021 0610  Gross per 24 hour   Intake 260 ml   Output 400 ml   Net -140 ml     Wt Readings from Last 3 Encounters:   06/29/21 139 lb 15.9 oz (63.5 kg)   02/23/21 153 lb (69.4 kg)   08/24/20 165 lb (74.8 kg)       Physical Exam:  General: In no acute distress. [place] +. Awake, alert, and oriented  X 3. Elderly, thin and frail in appearance  Skin:  Warm and dry. No new appearing rashes or lesions. Neck:  Supple. No JVD or carotid bruit appreciated. Chest: Lungs with diminished breath sounds bilaterally. No wheezes/rhonchi/rales  Cardiovascular:  Irreg; normal S1 and S2. No murmur/gallop or rub  Abdomen:  soft, nontender, nondistended, +bowel sounds. Extremities:  Trace bilateral ankle edema. No clubbing or cyanosis. 2+ bilateral radial/DP/PT pulses.  Cap refill brisk    Medications:    furosemide  40 mg Intravenous Daily    enoxaparin  30 mg Subcutaneous Daily    potassium bicarb-citric acid  20 mEq Oral Daily    cefepime  2,000 mg Intravenous Q24H    ipratropium-albuterol  1 ampule Inhalation BID    sertraline  50 mg Oral Daily    isosorbide mononitrate  60 mg Oral Daily    aspirin  81 mg Oral Daily    amLODIPine  10 mg Oral Daily    sodium chloride flush  10 mL Intravenous 2 times per day    hydrALAZINE  100 mg Oral BID      sodium chloride       perflutren lipid microspheres, albuterol, sodium chloride flush, sodium chloride, ondansetron, polyethylene glycol, acetaminophen **OR** acetaminophen    Lab Data:  CBC:   Recent Labs     06/27/21  0621 06/28/21  0545 06/29/21  0548   WBC 3.5* 5.9 5.4   HGB 10.2* 9.5* 9.9*   * 114* 117*     BMP:    Recent Labs     06/27/21  0621 06/28/21  0545 06/29/21  0548   * 147* 144   K 3.7 3.1* 3.7   CO2 28 28 31   BUN 34* 37* 39*   CREATININE 1.5* 1.7* 1.6*     LIVR:   Recent Labs     06/26/21  1631   AST 18   ALT 11     Results for Zana Barthel (MRN [id number]) as of 6/29/2021 09:53   Ref.  Range 6/26/2021 16:31 6/27/2021 06:21   Pro-BNP Latest Ref Range: 0 - 449 pg/mL 31,412 (H)    Troponin Latest Ref Range: <0.01 ng/mL 0.05 (H) 0.04 (H)        ECG:  Atrial fib with slow ventricular rate; LBBB     6/26/2021 CTPA:   Slightly limited exam due to the motion artifact along the lung bases with no   obvious pulmonary embolus identified.  Recommend clinical follow-up.       Mildly dilated and atherosclerotic thoracic aorta with no aneurysm or   dissection       Moderate bibasilar pleural effusions and associated moderate atelectasis and   consolidations along the lung bases extending superiorly which is more   prominent on the left with probable small loculated pleural fluid collections   along the upper chest bilaterally.  Recommend follow-up       Borderline enlarged mediastinal lymph nodes which are probably reactive in   etiology.  Recommend follow-up.       Status post CABG surgery and mild cardiomegaly which is unchanged.             3/21/2017 Echo:   Left ventricle size is normal. Mild concentric left ventricular hypertrophy   is present. Ejection fraction is visually estimated to be 55%. No regional   wall motion abnormalities are noted. Patient appears to be in atrial   fibrillation.   The right ventricle is moderately dilated. Right ventricular systolic   function appears mildly reduced.   The left atrium is mildly dilated.   The right atrium is moderately dilated.   Mild aortic, mitral, pulmonic regurgitation.   There is moderate tricuspid regurgitation.   Estimated pulmonary artery systolic pressure is elevated at 64 mmHg assuming   a right atrial pressure of 8 mmHg.   There is a large pleural effusion.     3/21/2017 CTA:  No acute pulmonary embolus detected.       Moderate bilateral pleural effusions and basilar consolidations.  Atelectasis   is also present.  Pulmonary edema is suspected.  Superimposed pneumonia   and/or aspiration may be present.       Marked cardiac enlargement.  Coronary artery calcifications are heavy.      Assessment/Plan:    1. Sick sinus syndrome/Permnent atrial fibrillation with slow ventricular response  -denies dizziness/lightheadedness  -no AV maxx blockers given bradycardia  -not on anticoagulation d/t advanced age and dementia  -No plans for invasive procedures as per wishes of Mr. Hardik Bo and his family  -hospice to meet with family today     2. Acute on chronic right sided failure/Cor pulmonale. Pulmonary HTN  -continue Lasix (change to po at discharge)  -continue O2     3. CKD, stage IIIb  -Cr stable  -continue diuretic     4. Elevated troponin  -flat and not c/w ACS  -chronically elevated  -has known CAD with prior CABG: continue medical management with ASA  -no plans for further cardiac evaluation or procedures     5. Essential hypertension  -BP noted     6. COPD  -management per Primary team and pulmonary     7.  Dementia  -continue comfort care measures    Plan is for family to meet with hospice today and plan on [facility] with HOC. No further intervention planned per cardiology. Will follow peripherally. Thank you for allowing us to participate in Mr. Loreto Montejo kalin.     Electronically signed by RAYSHAWN Damon CNP on 6/29/2021 at 9:51 AM

## 2021-06-29 NOTE — PROGRESS NOTES
Pt rested quietly overnight. Ambulated to the BR with CGA and walker this morning. LONG noted. O2 remains at 1.5 L/NC. Wt obtained and recorded on standing scale. Pt denies any needs. Call light in reach. Will monitor.

## 2021-06-29 NOTE — PLAN OF CARE
Problem: Falls - Risk of:  Goal: Will remain free from falls  Description: Will remain free from falls  6/28/2021 2314 by Luis Miguel Chavez RN  Outcome: Ongoing     Problem: Falls - Risk of:  Goal: Absence of physical injury  Description: Absence of physical injury  6/28/2021 2314 by Luis Miguel Chavez RN  Outcome: Ongoing   Fall risk assessment completed every shift. All precautions in place. Pt has call light within reach at all times. Room clear of clutter. Pt aware to call for assistance when getting up. Problem: Safety:  Goal: Free from accidental physical injury  Description: Free from accidental physical injury  6/28/2021 2314 by Luis Miguel Chavez RN  Outcome: Ongoing     Problem: Safety:  Goal: Free from intentional harm  Description: Free from intentional harm  6/28/2021 2314 by Luis Miguel Chavez RN  Outcome: Ongoing     Problem: Daily Care:  Goal: Daily care needs are met  Description: Daily care needs are met  6/28/2021 2314 by Luis Miguel Chavez RN  Outcome: Ongoing   Daily care needs are being met. Problem: Skin Integrity:  Goal: Skin integrity will stabilize  Description: Skin integrity will stabilize  6/28/2021 2314 by Luis Miguel Chavez RN  Outcome: Ongoing     Problem: OXYGENATION/RESPIRATORY FUNCTION  Goal: Patient will maintain patent airway  6/28/2021 2314 by Luis Miguel Chavez RN  Outcome: Ongoing   Pt able to maintain a patent airway. Problem: OXYGENATION/RESPIRATORY FUNCTION  Goal: Patient will achieve/maintain normal respiratory rate/effort  Description: Respiratory rate and effort will be within normal limits for the patient  6/28/2021 2314 by Luis Miguel Chavez RN  Outcome: Ongoing   RR remains WNL. No c/o SOB. O2 at 1.5L/NC. will monitor.   Problem: HEMODYNAMIC STATUS  Goal: Patient has stable vital signs and fluid balance  6/28/2021 2314 by Luis Miguel Chavez RN  Outcome: Ongoing     Problem: FLUID AND ELECTROLYTE IMBALANCE  Goal: Fluid and electrolyte

## 2021-07-27 PROBLEM — R79.89 ELEVATED TROPONIN: Status: RESOLVED | Noted: 2018-11-21 | Resolved: 2021-07-27

## 2021-07-27 PROBLEM — R77.8 ELEVATED TROPONIN: Status: RESOLVED | Noted: 2018-11-21 | Resolved: 2021-07-27

## 2022-07-30 NOTE — TELEPHONE ENCOUNTER
Daughter of patient calling regarding patient went to the er on 3/04/2020 for hematuria  red in urine. Patient wanted to schedule er follow up but dr Apoorva Dumont does not have any availibilty for within 7 days. [facility] only has one opening, and patient  Daughter cannot get patient here that day on 3/13 . Patient has an appt on 3/18 at 11:00   Not sure if there he can be seen for er follow up for that appt, or somehow can get him in sooner?      Please advise 30-Jul-2022 02:58

## 2024-10-02 NOTE — ED NOTES
Patients son updated on the plan of care and states he will call tomorrow.        [facility]  06/26/21 [id number] pleasant, well nourished, well developed, in no acute distress , normal communication ability